# Patient Record
Sex: FEMALE | URBAN - METROPOLITAN AREA
[De-identification: names, ages, dates, MRNs, and addresses within clinical notes are randomized per-mention and may not be internally consistent; named-entity substitution may affect disease eponyms.]

---

## 2022-11-29 ENCOUNTER — TRANSCRIBE ORDER (OUTPATIENT)
Dept: SCHEDULING | Age: 82
End: 2022-11-29

## 2022-11-29 DIAGNOSIS — R92.8 ABNORMAL MAMMOGRAM: Primary | ICD-10-CM

## 2022-12-06 ENCOUNTER — OFFICE VISIT (OUTPATIENT)
Dept: URGENT CARE | Age: 82
End: 2022-12-06
Payer: MEDICARE

## 2022-12-06 VITALS
HEART RATE: 74 BPM | TEMPERATURE: 98.4 F | RESPIRATION RATE: 16 BRPM | OXYGEN SATURATION: 98 % | DIASTOLIC BLOOD PRESSURE: 64 MMHG | SYSTOLIC BLOOD PRESSURE: 140 MMHG | WEIGHT: 155 LBS

## 2022-12-06 DIAGNOSIS — R79.89 ELEVATED LFTS: ICD-10-CM

## 2022-12-06 DIAGNOSIS — Z86.73 HISTORY OF CVA (CEREBROVASCULAR ACCIDENT): ICD-10-CM

## 2022-12-06 DIAGNOSIS — E87.0 HYPERNATREMIA: ICD-10-CM

## 2022-12-06 DIAGNOSIS — I65.23 BILATERAL CAROTID ARTERY STENOSIS: ICD-10-CM

## 2022-12-06 DIAGNOSIS — I10 PRIMARY HYPERTENSION: Primary | ICD-10-CM

## 2022-12-06 LAB
ALBUMIN SERPL-MCNC: 3.9 G/DL
ALKALINE PHOS POC: 87
ALT SERPL-CCNC: 45 U/L (ref 7–35)
AST SERPL-CCNC: 44 U/L (ref 13–35)
BUN BLD-MCNC: 15 MG/DL
CALCIUM BLD-MCNC: 10.3 MG/DL
CHLORIDE BLD-SCNC: 108 MMOL/L
CO2 POC: 28 MEQ/L
CREAT BLD-MCNC: 0.8 MG/DL
EGFR (POC): ABNORMAL
GLUCOSE POC: 111 MG/DL
POTASSIUM SERPL-SCNC: 3.7 MMOL/L
PROT SERPL-MCNC: 7.3 G/DL
SODIUM SERPL-SCNC: 150 MMOL/L
TOTAL BILIRUBIN POC: 0.9 MG/DL

## 2022-12-06 PROCEDURE — 80053 COMPREHEN METABOLIC PANEL: CPT | Performed by: FAMILY MEDICINE

## 2022-12-06 PROCEDURE — 3078F DIAST BP <80 MM HG: CPT | Performed by: FAMILY MEDICINE

## 2022-12-06 PROCEDURE — 1123F ACP DISCUSS/DSCN MKR DOCD: CPT | Performed by: FAMILY MEDICINE

## 2022-12-06 PROCEDURE — 99203 OFFICE O/P NEW LOW 30 MIN: CPT | Performed by: FAMILY MEDICINE

## 2022-12-06 PROCEDURE — 3077F SYST BP >= 140 MM HG: CPT | Performed by: FAMILY MEDICINE

## 2022-12-06 RX ORDER — GUAIFENESIN 100 MG/5ML
81 LIQUID (ML) ORAL DAILY
COMMUNITY

## 2022-12-06 RX ORDER — CLOPIDOGREL BISULFATE 75 MG/1
TABLET ORAL
COMMUNITY

## 2022-12-06 RX ORDER — ATORVASTATIN CALCIUM 80 MG/1
80 TABLET, FILM COATED ORAL DAILY
COMMUNITY

## 2022-12-06 RX ORDER — AMLODIPINE BESYLATE 5 MG/1
5 TABLET ORAL DAILY
COMMUNITY

## 2022-12-06 RX ORDER — LISINOPRIL 20 MG/1
TABLET ORAL DAILY
COMMUNITY

## 2022-12-06 NOTE — PROGRESS NOTES
[address] [facility]  Chief Complaint:     Chief Complaint   Patient presents with    Hypotension     Pt c/o BP of 129/61 today. Subjective:   HPI:  Sugar Mayes is a 81 y.o. female that presents for:    Blood pressure issues:  - hallucinations back in October- sent her to ED    - UTI noted, had acute CVA, blood pressure was over 200s    - blood pressure was uncontrolled went back to ED was on 200s    - started on Norvasc 5mg, lisinopril 20mg, Plavix, lipitor  - Saw PCP who was monitoring her BP   - Doesn't have PCP, neurologist, or vascular here (moved from [facility])  - Mild to moderate dementia at baseline     - Brought her in today as she had BP at home in 120s and 130s and complained of some light headedness (was told by previous doctors that her BP goal was 140-190 SBP so they were worried this was too low for her)  - Reports no dizziness currently and no worsening symptoms going from sitting to standing   - Has maybe one or two cups of fluid a day (around 20 ounce cup)    ROS:   Review of Systems   Respiratory:  Negative for shortness of breath. Cardiovascular:  Negative for chest pain. Neurological:  Negative for dizziness. Psychiatric/Behavioral:  Positive for memory loss. Past medical history, social history, medications, and allergies personally reviewed. Past Medical History:   Diagnosis Date    CAD (coronary artery disease)     Cancer ([facility] [address]     Diabetes ([facility] [address]     Hypertension     Psychiatric disorder     Stroke [facility])         Medications:   Current Outpatient Medications   Medication Sig    lisinopriL (PRINIVIL, ZESTRIL) 20 mg tablet Take  by mouth daily. amLODIPine (NORVASC) 5 mg tablet Take 5 mg by mouth daily. clopidogreL (Plavix) 75 mg tab Take  by mouth. aspirin 81 mg chewable tablet Take 81 mg by mouth daily. atorvastatin (LIPITOR) 80 mg tablet Take 80 mg by mouth daily. No current facility-administered medications for this visit. Allergies:  No Known Allergies       Objective:   Vitals reviewed. Visit Vitals  BP (!) 140/64   Pulse 74   Temp 98.4 °F (36.9 °C)   Resp 16   Wt 155 lb (70.3 kg)   SpO2 98%        Physical Exam:  General Alert. No distress. Not diaphoretic. No jaundice, cyanosis, pallor. HENT Head Normocephalic and atraumatic. Eyes Conjunctivae pink, no discharge. No scleral icterus. EOMI. Cardio Normal rate, regular rhythm. No murmur, gallop, or friction rub. No chest wall tenderness. Pulmonary Effort normal. Breath sounds normal. No respiratory distress. No wheezes, rales, or rhonchi. No Stridor. Neurological No focal deficits. No speech changes. Skin Skin is warm and dry. No rash noted. No erythema. Psychiatric Mood, affect, and judgment normal.        Assessment/Plan:     1. Primary hypertension  -     AMB POC COMPREHENSIVE METABOLIC PANEL  Blood pressure came down to acceptable range. Didn't make adjustments to medications at this time as BP normalized. Recommended establishing with PCP as soon as possible for better BP control. Explained that given her age goal BP around 140 is acceptable but letting it  get up to 190 is putting her back at stroke risk. Continue home BP log.    2. Bilateral carotid artery stenosis  Requesting referral, one artery with 70% stenosis as diagnosed by previous vascular specialist.  -     REFERRAL TO NEUROLOGY  -     REFERRAL TO VASCULAR SURGERY    3. History of CVA (cerebrovascular accident)  Requesting referral.   -     REFERRAL TO NEUROLOGY    4. Hypernatremia  Elevated at 150 but unclear baseline given new patient. Discussed encouraging fluid intake and repeat with new PCP. Given ER/return precautions. 5. Elevated LFTs   Mild elevation, should have repeat and further workup with PCP. Follow up:     Pt was discussed with Dr. Rasta Rose (attending physician). I have reviewed pertinent labs results and other data.  I have discussed the diagnosis with the patient and the intended plan as seen in the above orders. The patient has received an after-visit summary and questions were answered concerning future plans. I have discussed medication side effects and warnings with the patient as well.     Heena Jacobs DO  PGY-2 [facility]  12/06/22

## 2022-12-21 ENCOUNTER — HOSPITAL ENCOUNTER (OUTPATIENT)
Dept: GENERAL RADIOLOGY | Age: 82
Discharge: HOME | End: 2022-12-21
Attending: SURGERY
Payer: MEDICARE

## 2022-12-21 ENCOUNTER — HOSPITAL ENCOUNTER (OUTPATIENT)
Dept: PREADMISSION TESTING | Age: 82
Discharge: HOME | End: 2022-12-21
Payer: MEDICARE

## 2022-12-21 VITALS
HEIGHT: 60 IN | TEMPERATURE: 98.1 F | HEART RATE: 68 BPM | BODY MASS INDEX: 29.43 KG/M2 | SYSTOLIC BLOOD PRESSURE: 178 MMHG | DIASTOLIC BLOOD PRESSURE: 66 MMHG | WEIGHT: 149.91 LBS

## 2022-12-21 LAB
ABO + RH BLD: NORMAL
ALBUMIN SERPL-MCNC: 3.5 G/DL (ref 3.5–5)
ALBUMIN/GLOB SERPL: 1.2 {RATIO} (ref 1.1–2.2)
ALP SERPL-CCNC: 85 U/L (ref 45–117)
ALT SERPL-CCNC: 65 U/L (ref 12–78)
ANION GAP SERPL CALC-SCNC: 5 MMOL/L (ref 5–15)
APTT PPP: 23.8 SEC (ref 22.1–31)
AST SERPL-CCNC: 49 U/L (ref 15–37)
BASOPHILS # BLD: 0 K/UL (ref 0–0.1)
BASOPHILS NFR BLD: 0 % (ref 0–1)
BILIRUB SERPL-MCNC: 0.7 MG/DL (ref 0.2–1)
BLOOD GROUP ANTIBODIES SERPL: NORMAL
BUN SERPL-MCNC: 13 MG/DL (ref 6–20)
BUN/CREAT SERPL: 17 (ref 12–20)
CALCIUM SERPL-MCNC: 9.7 MG/DL (ref 8.5–10.1)
CHLORIDE SERPL-SCNC: 112 MMOL/L (ref 97–108)
CO2 SERPL-SCNC: 25 MMOL/L (ref 21–32)
CREAT SERPL-MCNC: 0.75 MG/DL (ref 0.55–1.02)
DIFFERENTIAL METHOD BLD: ABNORMAL
EOSINOPHIL # BLD: 0 K/UL (ref 0–0.4)
EOSINOPHIL NFR BLD: 0 % (ref 0–7)
ERYTHROCYTE [DISTWIDTH] IN BLOOD BY AUTOMATED COUNT: 12.8 % (ref 11.5–14.5)
EST. AVERAGE GLUCOSE BLD GHB EST-MCNC: 117 MG/DL
GLOBULIN SER CALC-MCNC: 2.9 G/DL (ref 2–4)
GLUCOSE SERPL-MCNC: 152 MG/DL (ref 65–100)
HBA1C MFR BLD: 5.7 % (ref 4–5.6)
HCT VFR BLD AUTO: 36.3 % (ref 35–47)
HGB BLD-MCNC: 12.3 G/DL (ref 11.5–16)
IMM GRANULOCYTES # BLD AUTO: 0 K/UL (ref 0–0.04)
IMM GRANULOCYTES NFR BLD AUTO: 0 % (ref 0–0.5)
INR PPP: 1 (ref 0.9–1.1)
LYMPHOCYTES # BLD: 1.1 K/UL (ref 0.8–3.5)
LYMPHOCYTES NFR BLD: 16 % (ref 12–49)
MCH RBC QN AUTO: 32.2 PG (ref 26–34)
MCHC RBC AUTO-ENTMCNC: 33.9 G/DL (ref 30–36.5)
MCV RBC AUTO: 95 FL (ref 80–99)
MONOCYTES # BLD: 0.3 K/UL (ref 0–1)
MONOCYTES NFR BLD: 4 % (ref 5–13)
NEUTS SEG # BLD: 5.5 K/UL (ref 1.8–8)
NEUTS SEG NFR BLD: 80 % (ref 32–75)
NRBC # BLD: 0 K/UL (ref 0–0.01)
NRBC BLD-RTO: 0 PER 100 WBC
PLATELET # BLD AUTO: 184 K/UL (ref 150–400)
POTASSIUM SERPL-SCNC: 3.5 MMOL/L (ref 3.5–5.1)
PROT SERPL-MCNC: 6.4 G/DL (ref 6.4–8.2)
PROTHROMBIN TIME: 10.7 SEC (ref 9–11.1)
RBC # BLD AUTO: 3.82 M/UL (ref 3.8–5.2)
RBC MORPH BLD: ABNORMAL
SODIUM SERPL-SCNC: 142 MMOL/L (ref 136–145)
SPECIMEN EXP DATE BLD: NORMAL
THERAPEUTIC RANGE,PTTT: NORMAL SECS (ref 58–77)
WBC # BLD AUTO: 6.9 K/UL (ref 3.6–11)

## 2022-12-21 PROCEDURE — 80053 COMPREHEN METABOLIC PANEL: CPT

## 2022-12-21 PROCEDURE — 36415 COLL VENOUS BLD VENIPUNCTURE: CPT

## 2022-12-21 PROCEDURE — 85610 PROTHROMBIN TIME: CPT

## 2022-12-21 PROCEDURE — 85730 THROMBOPLASTIN TIME PARTIAL: CPT

## 2022-12-21 PROCEDURE — 85025 COMPLETE CBC W/AUTO DIFF WBC: CPT

## 2022-12-21 PROCEDURE — 93005 ELECTROCARDIOGRAM TRACING: CPT

## 2022-12-21 PROCEDURE — 86900 BLOOD TYPING SEROLOGIC ABO: CPT

## 2022-12-21 PROCEDURE — 71046 X-RAY EXAM CHEST 2 VIEWS: CPT

## 2022-12-21 PROCEDURE — 83036 HEMOGLOBIN GLYCOSYLATED A1C: CPT

## 2022-12-21 NOTE — PERIOP NOTES
[address] INSTRUCTIONS    Surgery Date:   12/30/2022    Your surgeon's office or [facility] staff will call you between 4 PM- 8 PM the day before surgery with your arrival time. If your surgery is on a Monday, you will receive a call the preceding Friday. Please report to [facility] Patient Access/Admitting on the 1st floor. Bring your insurance card, photo identification, and any copayment ( if applicable). If you are going home the same day of your surgery, you must have a responsible adult to drive you home. You need to have a responsible adult to stay with you the first 24 hours after surgery and you should not drive a car for 24 hours following your surgery. Nothing to eat or drink after midnight the night before surgery. This includes no water, gum, mints, coffee, juice, etc.  Please note special instructions, if applicable, below for medications. Do NOT drink alcohol or smoke 24 hours before surgery. STOP smoking for 14 days prior as it helps with breathing and healing after surgery. If you are being admitted to the hospital, please leave personal belongings/luggage in your car until you have an assigned hospital room number. Please wear comfortable clothes. Wear your glasses instead of contacts. We ask that all money, jewelry and valuables be left at home. Wear no make up, particularly mascara, the day of surgery. All body piercings, rings, and jewelry need to be removed and left at home. Please remove any nail polish or artificial nails from your fingernails. Please wear your hair loose or down. Please no pony-tails, buns, or any metal hair accessories. If you shower the morning of surgery, please do not apply any lotions or powders afterwards. You may wear deodorant, unless having breast surgery. Do not shave any body area within 24 hours of your surgery. Please follow all instructions to avoid any potential surgical cancellation.   Should your physical condition change, (i.e. fever, cold, flu, etc.) please notify your surgeon as soon as possible. It is important to be on time. If a situation occurs where you may be delayed, please call:  [phone] / [phone] on the day of surgery. The Preadmission Testing staff can be reached at [phone]. Special instructions: [facility]      Current Outpatient Medications   Medication Sig    EC ASPIRIN PO Take 81 mg by mouth. tetrahydrozoline/polyethyl gly (EYE DROPS OP) Apply  to eye as needed. lisinopriL (PRINIVIL, ZESTRIL) 20 mg tablet Take  by mouth Every morning. amLODIPine (NORVASC) 5 mg tablet Take 5 mg by mouth Every morning. clopidogreL (PLAVIX) 75 mg tab Take  by mouth Every morning. atorvastatin (LIPITOR) 80 mg tablet Take 80 mg by mouth nightly. No current facility-administered medications for this encounter. YOU MUST ONLY TAKE THESE MEDICATIONS THE MORNING OF SURGERY WITH A SIP OF WATER: AMLODIPINE,   MEDICATIONS TO TAKE THE MORNING OF SURGERY ONLY IF NEEDED: EYE DROPS    Ask your surgeon/prescribing physician about when/if to STOP taking these medications: ASPIRIN AND PLAVIX    Stop all vitamins, herbal medicines and Aspirin containing products 7 days prior to surgery. Stop any non-steroidal anti-inflammatory drugs (i.e. Ibuprofen, Naproxen, Advil, Aleve) 3 days before surgery. You may take Tylenol. If you are currently taking Plavix, Coumadin, or any other blood-thinning/anticoagulant medication contact your prescribing physician for instructions. Preventing Infections Before and After - Your Surgery    IMPORTANT INSTRUCTIONS      You play an important role in your health and preparation for surgery. To reduce the germs on your skin you will need to shower with CHG soap (Chorhexidine gluconate 4%) two times before surgery.     CHG soap (Hibiclens, Hex-A-Clens or store brand)  CHG soap will be provided at your Preadmission Testing (PAT) appointment. If you do not have a PAT appointment before surgery, you may arrange to  CHG soap from our office or purchase CHG soap at a pharmacy, grocery or department store. You need to purchase TWO 4 ounce bottles to use for your 2 showers. Steps to follow:  Sudie Loron your hair with your normal shampoo and your body with regular soap and rinse well to remove shampoo and soap from your skin. Wet a clean washcloth and turn off the shower. Put CHG soap on washcloth and apply to your entire body from the neck down. Do not use on your head, face or private parts(genitals). Do not use CHG soap on open sores, wounds or areas of skin irritation. Wash you body gently for 5 minutes. Do not wash your skin too hard. This soap does not create lather. Pay special attention to your underarms and from your belly button to your feet. Turn the shower back on and rinse well to get CHG soap off your body. Pat your skin dry with a clean, dry towel. Do not apply lotions or moisturizer. Put on clean clothes and sleep on fresh bed sheets and do not allow pets to sleep with you. Shower with CHG soap 2 times before your surgery  The evening before your surgery  The morning of your surgery      Tips to help prevent infections after your surgery:  Protect your surgical wound from germs:  Hand washing is the most important thing you and your caregivers can do to prevent infections. Keep your bandage clean and dry! Do not touch your surgical wound. Use clean, freshly washed towels and washcloths every time you shower; do not share bath linens with others. Until your surgical wound is healed, wear clothing and sleep on bed linens each day that are clean and freshly washed. Do not allow pets to sleep in your bed with you or touch your surgical wound. Do not smoke - smoking delays wound healing. This may be a good time to stop smoking.   If you have diabetes, it is important for you to manage your blood sugar levels properly before your surgery as well as after your surgery. Poorly managed blood sugar levels slow down wound healing and prevent you from healing completely. Patient Information Regarding COVID Restrictions    Day of Procedure    Please park in the parking deck or any designated visitor parking lot. Enter the facility through the Main Entrance of the hospital.  On the day of surgery, please provide the cell phone number of the person who will be waiting for you to the Patient Access representative at the time of registration. Masks are highly recommended in the hospital, but not required. Once your procedure and the immediate recovery period is completed, a nurse in the recovery area will contact your designated visitor to inform them of your room number or to otherwise review other pertinent information regarding your care. Social distancing practices are strongly encouraged in waiting areas and the cafeteria. The patient was contacted  in person. She verbalized understanding of all instructions does not  need reinforcement.

## 2022-12-21 NOTE — PERIOP NOTES
COPY OF COVID VACCINATION CARD PLACED ON CHART    PT AND GRANDSON INSTRUCTED TO GO HAVE CXR DONE AFTER PAT. DIRECTIONS GIVEN.

## 2022-12-22 LAB
ATRIAL RATE: 66 BPM
CALCULATED P AXIS, ECG09: 67 DEGREES
CALCULATED R AXIS, ECG10: 6 DEGREES
CALCULATED T AXIS, ECG11: 47 DEGREES
DIAGNOSIS, 93000: NORMAL
P-R INTERVAL, ECG05: 182 MS
Q-T INTERVAL, ECG07: 430 MS
QRS DURATION, ECG06: 122 MS
QTC CALCULATION (BEZET), ECG08: 450 MS
VENTRICULAR RATE, ECG03: 66 BPM

## 2022-12-23 ENCOUNTER — HOSPITAL ENCOUNTER (OUTPATIENT)
Dept: MAMMOGRAPHY | Age: 82
Discharge: HOME | End: 2022-12-23

## 2022-12-23 DIAGNOSIS — R92.8 ABNORMAL MAMMOGRAM: ICD-10-CM

## 2022-12-23 NOTE — PROGRESS NOTES
Pt came in for Lt dx mammogram to follow up  from abnormal mammogram done at an out of state facility. As of today, we have not received those images. Registrar Eamon Luong spoke to pt's daughter who requested images 2 weeks ago. She will call facility & make sure images have been sent to us.  She will also reschedule her mom's mammogram.

## 2022-12-30 ENCOUNTER — HOSPITAL ENCOUNTER (INPATIENT)
Age: 82
LOS: 1 days | Discharge: HOME | End: 2022-12-31
Attending: EMERGENCY MEDICINE | Admitting: SURGERY
Payer: MEDICARE

## 2022-12-30 ENCOUNTER — ANESTHESIA (OUTPATIENT)
Dept: CARDIOTHORACIC SURGERY | Age: 82
End: 2022-12-30
Payer: MEDICARE

## 2022-12-30 ENCOUNTER — APPOINTMENT (OUTPATIENT)
Dept: GENERAL RADIOLOGY | Age: 82
End: 2022-12-30
Attending: SURGERY
Payer: MEDICARE

## 2022-12-30 ENCOUNTER — APPOINTMENT (OUTPATIENT)
Dept: GENERAL RADIOLOGY | Age: 82
End: 2022-12-30
Attending: EMERGENCY MEDICINE
Payer: MEDICARE

## 2022-12-30 ENCOUNTER — ANESTHESIA EVENT (OUTPATIENT)
Dept: CARDIOTHORACIC SURGERY | Age: 82
End: 2022-12-30
Payer: MEDICARE

## 2022-12-30 DIAGNOSIS — R55 NEAR SYNCOPE: Primary | ICD-10-CM

## 2022-12-30 DIAGNOSIS — E87.6 HYPOKALEMIA: ICD-10-CM

## 2022-12-30 PROBLEM — I65.22 SYMPTOMATIC STENOSIS OF LEFT CAROTID ARTERY: Status: ACTIVE | Noted: 2022-12-30

## 2022-12-30 LAB
ACT BLD: 263 SECS (ref 79–138)
ALBUMIN SERPL-MCNC: 3.6 G/DL (ref 3.5–5)
ALBUMIN/GLOB SERPL: 1 {RATIO} (ref 1.1–2.2)
ALP SERPL-CCNC: 86 U/L (ref 45–117)
ALT SERPL-CCNC: 43 U/L (ref 12–78)
ANION GAP SERPL CALC-SCNC: 8 MMOL/L (ref 5–15)
APPEARANCE UR: CLEAR
AST SERPL-CCNC: 34 U/L (ref 15–37)
ATRIAL RATE: 79 BPM
BACTERIA URNS QL MICRO: NEGATIVE /HPF
BASOPHILS # BLD: 0 K/UL (ref 0–0.1)
BASOPHILS NFR BLD: 0 % (ref 0–1)
BILIRUB SERPL-MCNC: 1 MG/DL (ref 0.2–1)
BILIRUB UR QL: NEGATIVE
BUN SERPL-MCNC: 17 MG/DL (ref 6–20)
BUN/CREAT SERPL: 17 (ref 12–20)
CALCIUM SERPL-MCNC: 9.4 MG/DL (ref 8.5–10.1)
CALCULATED P AXIS, ECG09: -28 DEGREES
CALCULATED R AXIS, ECG10: 16 DEGREES
CALCULATED T AXIS, ECG11: 51 DEGREES
CAOX CRY URNS QL MICRO: ABNORMAL
CHLORIDE SERPL-SCNC: 106 MMOL/L (ref 97–108)
CO2 SERPL-SCNC: 22 MMOL/L (ref 21–32)
COLOR UR: ABNORMAL
COMMENT, HOLDF: NORMAL
COMMENT, HOLDF: NORMAL
CREAT SERPL-MCNC: 1.01 MG/DL (ref 0.55–1.02)
DIAGNOSIS, 93000: NORMAL
DIFFERENTIAL METHOD BLD: ABNORMAL
EOSINOPHIL # BLD: 0 K/UL (ref 0–0.4)
EOSINOPHIL NFR BLD: 0 % (ref 0–7)
EPITH CASTS URNS QL MICRO: ABNORMAL /LPF
ERYTHROCYTE [DISTWIDTH] IN BLOOD BY AUTOMATED COUNT: 13.3 % (ref 11.5–14.5)
GLOBULIN SER CALC-MCNC: 3.6 G/DL (ref 2–4)
GLUCOSE BLD STRIP.AUTO-MCNC: 97 MG/DL (ref 65–117)
GLUCOSE SERPL-MCNC: 149 MG/DL (ref 65–100)
GLUCOSE UR STRIP.AUTO-MCNC: NEGATIVE MG/DL
HCT VFR BLD AUTO: 34.2 % (ref 35–47)
HGB BLD-MCNC: 11.9 G/DL (ref 11.5–16)
HGB UR QL STRIP: NEGATIVE
IMM GRANULOCYTES # BLD AUTO: 0 K/UL (ref 0–0.04)
IMM GRANULOCYTES NFR BLD AUTO: 0 % (ref 0–0.5)
KETONES UR QL STRIP.AUTO: NEGATIVE MG/DL
LEUKOCYTE ESTERASE UR QL STRIP.AUTO: ABNORMAL
LYMPHOCYTES # BLD: 1.1 K/UL (ref 0.8–3.5)
LYMPHOCYTES NFR BLD: 13 % (ref 12–49)
MAGNESIUM SERPL-MCNC: 1.8 MG/DL (ref 1.6–2.4)
MCH RBC QN AUTO: 31.9 PG (ref 26–34)
MCHC RBC AUTO-ENTMCNC: 34.8 G/DL (ref 30–36.5)
MCV RBC AUTO: 91.7 FL (ref 80–99)
MONOCYTES # BLD: 0.6 K/UL (ref 0–1)
MONOCYTES NFR BLD: 8 % (ref 5–13)
NEUTS SEG # BLD: 6.2 K/UL (ref 1.8–8)
NEUTS SEG NFR BLD: 79 % (ref 32–75)
NITRITE UR QL STRIP.AUTO: NEGATIVE
NRBC # BLD: 0 K/UL (ref 0–0.01)
NRBC BLD-RTO: 0 PER 100 WBC
P-R INTERVAL, ECG05: 110 MS
PH UR STRIP: 6 [PH] (ref 5–8)
PLATELET # BLD AUTO: 173 K/UL (ref 150–400)
PMV BLD AUTO: 10.7 FL (ref 8.9–12.9)
POTASSIUM SERPL-SCNC: 2.7 MMOL/L (ref 3.5–5.1)
POTASSIUM SERPL-SCNC: 3.8 MMOL/L (ref 3.5–5.1)
PROT SERPL-MCNC: 7.2 G/DL (ref 6.4–8.2)
PROT UR STRIP-MCNC: NEGATIVE MG/DL
Q-T INTERVAL, ECG07: 394 MS
QRS DURATION, ECG06: 112 MS
QTC CALCULATION (BEZET), ECG08: 451 MS
RBC # BLD AUTO: 3.73 M/UL (ref 3.8–5.2)
RBC #/AREA URNS HPF: ABNORMAL /HPF (ref 0–5)
SAMPLES BEING HELD,HOLD: NORMAL
SAMPLES BEING HELD,HOLD: NORMAL
SERVICE CMNT-IMP: NORMAL
SODIUM SERPL-SCNC: 136 MMOL/L (ref 136–145)
SP GR UR REFRACTOMETRY: 1.01 (ref 1–1.03)
TROPONIN-HIGH SENSITIVITY: 28 NG/L (ref 0–51)
TROPONIN-HIGH SENSITIVITY: 35 NG/L (ref 0–51)
UROBILINOGEN UR QL STRIP.AUTO: 0.2 EU/DL (ref 0.2–1)
VENTRICULAR RATE, ECG03: 79 BPM
WBC # BLD AUTO: 7.9 K/UL (ref 3.6–11)
WBC URNS QL MICRO: ABNORMAL /HPF (ref 0–4)

## 2022-12-30 PROCEDURE — 77030008684 HC TU ET CUF COVD -B: Performed by: ANESTHESIOLOGY

## 2022-12-30 PROCEDURE — 36415 COLL VENOUS BLD VENIPUNCTURE: CPT

## 2022-12-30 PROCEDURE — 74011000250 HC RX REV CODE- 250: Performed by: NURSE ANESTHETIST, CERTIFIED REGISTERED

## 2022-12-30 PROCEDURE — 76010000131 HC OR TIME 2 TO 2.5 HR: Performed by: SURGERY

## 2022-12-30 PROCEDURE — C1725 CATH, TRANSLUMIN NON-LASER: HCPCS | Performed by: SURGERY

## 2022-12-30 PROCEDURE — 83735 ASSAY OF MAGNESIUM: CPT

## 2022-12-30 PROCEDURE — 74011250636 HC RX REV CODE- 250/636: Performed by: ANESTHESIOLOGY

## 2022-12-30 PROCEDURE — 84132 ASSAY OF SERUM POTASSIUM: CPT

## 2022-12-30 PROCEDURE — 74011250636 HC RX REV CODE- 250/636: Performed by: SURGERY

## 2022-12-30 PROCEDURE — C1892 INTRO/SHEATH,FIXED,PEEL-AWAY: HCPCS | Performed by: SURGERY

## 2022-12-30 PROCEDURE — 77030031139 HC SUT VCRL2 J&J -A: Performed by: SURGERY

## 2022-12-30 PROCEDURE — 76210000017 HC OR PH I REC 1.5 TO 2 HR: Performed by: SURGERY

## 2022-12-30 PROCEDURE — 71045 X-RAY EXAM CHEST 1 VIEW: CPT

## 2022-12-30 PROCEDURE — 80053 COMPREHEN METABOLIC PANEL: CPT

## 2022-12-30 PROCEDURE — 74011000250 HC RX REV CODE- 250: Performed by: SURGERY

## 2022-12-30 PROCEDURE — 77030002986 HC SUT PROL J&J -A: Performed by: SURGERY

## 2022-12-30 PROCEDURE — 84484 ASSAY OF TROPONIN QUANT: CPT

## 2022-12-30 PROCEDURE — 77030018673: Performed by: SURGERY

## 2022-12-30 PROCEDURE — 81001 URINALYSIS AUTO W/SCOPE: CPT

## 2022-12-30 PROCEDURE — C1894 INTRO/SHEATH, NON-LASER: HCPCS | Performed by: SURGERY

## 2022-12-30 PROCEDURE — 93005 ELECTROCARDIOGRAM TRACING: CPT

## 2022-12-30 PROCEDURE — 77030013060 HC DEV INFL PRSS MRTM -B: Performed by: SURGERY

## 2022-12-30 PROCEDURE — 99285 EMERGENCY DEPT VISIT HI MDM: CPT

## 2022-12-30 PROCEDURE — 74011250637 HC RX REV CODE- 250/637: Performed by: SURGERY

## 2022-12-30 PROCEDURE — X2AJ336 CEREBRAL EMBOLIC FILTRATION, EXTRACORPOREAL FLOW REVERSAL CIRCUIT FROM LEFT COMMON CAROTID ARTERY, PERCUTANEOUS APPROACH, NEW TECHNOLOGY GROUP 6: ICD-10-PCS | Performed by: SURGERY

## 2022-12-30 PROCEDURE — 77030008462 HC STPLR SKN PROX J&J -A: Performed by: SURGERY

## 2022-12-30 PROCEDURE — C1876 STENT, NON-COA/NON-COV W/DEL: HCPCS | Performed by: SURGERY

## 2022-12-30 PROCEDURE — 74011000250 HC RX REV CODE- 250: Performed by: ANESTHESIOLOGY

## 2022-12-30 PROCEDURE — 77030010507 HC ADH SKN DERMBND J&J -B: Performed by: SURGERY

## 2022-12-30 PROCEDURE — 77030021678 HC GLIDESCP STAT DISP VERT -B: Performed by: ANESTHESIOLOGY

## 2022-12-30 PROCEDURE — 77030026438 HC STYL ET INTUB CARD -A: Performed by: ANESTHESIOLOGY

## 2022-12-30 PROCEDURE — 74011250637 HC RX REV CODE- 250/637: Performed by: EMERGENCY MEDICINE

## 2022-12-30 PROCEDURE — 2709999900 HC NON-CHARGEABLE SUPPLY: Performed by: SURGERY

## 2022-12-30 PROCEDURE — 76060000035 HC ANESTHESIA 2 TO 2.5 HR: Performed by: SURGERY

## 2022-12-30 PROCEDURE — 74011250636 HC RX REV CODE- 250/636: Performed by: NURSE ANESTHETIST, CERTIFIED REGISTERED

## 2022-12-30 PROCEDURE — 85025 COMPLETE CBC W/AUTO DIFF WBC: CPT

## 2022-12-30 PROCEDURE — 037J3DZ DILATION OF LEFT COMMON CAROTID ARTERY WITH INTRALUMINAL DEVICE, PERCUTANEOUS APPROACH: ICD-10-PCS | Performed by: SURGERY

## 2022-12-30 PROCEDURE — 77030020061 HC IV BLD WRMR ADMIN SET 3M -B: Performed by: ANESTHESIOLOGY

## 2022-12-30 PROCEDURE — 03FY3ZZ FRAGMENTATION OF UPPER ARTERY, PERCUTANEOUS APPROACH: ICD-10-PCS | Performed by: SURGERY

## 2022-12-30 PROCEDURE — 65610000006 HC RM INTENSIVE CARE

## 2022-12-30 PROCEDURE — 77030002933 HC SUT MCRYL J&J -A: Performed by: SURGERY

## 2022-12-30 PROCEDURE — 74011000636 HC RX REV CODE- 636: Performed by: SURGERY

## 2022-12-30 PROCEDURE — 77030002996 HC SUT SLK J&J -A: Performed by: SURGERY

## 2022-12-30 PROCEDURE — 77030014008 HC SPNG HEMSTAT J&J -C: Performed by: SURGERY

## 2022-12-30 PROCEDURE — 74011250636 HC RX REV CODE- 250/636: Performed by: EMERGENCY MEDICINE

## 2022-12-30 PROCEDURE — 82962 GLUCOSE BLOOD TEST: CPT

## 2022-12-30 PROCEDURE — 77030019702 HC WRP THER MENM -C: Performed by: SURGERY

## 2022-12-30 PROCEDURE — 85347 COAGULATION TIME ACTIVATED: CPT

## 2022-12-30 DEVICE — 8 MM X 30 MM
Type: IMPLANTABLE DEVICE | Site: CAROTID | Status: FUNCTIONAL
Brand: ENROUTE TRANSCAROTID STENT

## 2022-12-30 RX ORDER — ENOXAPARIN SODIUM 100 MG/ML
40 INJECTION SUBCUTANEOUS EVERY 24 HOURS
Status: DISCONTINUED | OUTPATIENT
Start: 2022-12-31 | End: 2022-12-31 | Stop reason: HOSPADM

## 2022-12-30 RX ORDER — ACETAMINOPHEN 500 MG
1000 TABLET ORAL
Status: DISCONTINUED | OUTPATIENT
Start: 2022-12-30 | End: 2022-12-31 | Stop reason: HOSPADM

## 2022-12-30 RX ORDER — ONDANSETRON 2 MG/ML
INJECTION INTRAMUSCULAR; INTRAVENOUS AS NEEDED
Status: DISCONTINUED | OUTPATIENT
Start: 2022-12-30 | End: 2022-12-30 | Stop reason: HOSPADM

## 2022-12-30 RX ORDER — FENTANYL CITRATE 50 UG/ML
25 INJECTION, SOLUTION INTRAMUSCULAR; INTRAVENOUS
Status: DISCONTINUED | OUTPATIENT
Start: 2022-12-30 | End: 2022-12-30 | Stop reason: HOSPADM

## 2022-12-30 RX ORDER — MIDAZOLAM HYDROCHLORIDE 1 MG/ML
1 INJECTION, SOLUTION INTRAMUSCULAR; INTRAVENOUS AS NEEDED
Status: DISCONTINUED | OUTPATIENT
Start: 2022-12-30 | End: 2022-12-30 | Stop reason: HOSPADM

## 2022-12-30 RX ORDER — CLOPIDOGREL BISULFATE 75 MG/1
75 TABLET ORAL
Status: DISPENSED | OUTPATIENT
Start: 2022-12-30 | End: 2022-12-30

## 2022-12-30 RX ORDER — SODIUM CHLORIDE, SODIUM LACTATE, POTASSIUM CHLORIDE, CALCIUM CHLORIDE 600; 310; 30; 20 MG/100ML; MG/100ML; MG/100ML; MG/100ML
100 INJECTION, SOLUTION INTRAVENOUS CONTINUOUS
Status: DISCONTINUED | OUTPATIENT
Start: 2022-12-30 | End: 2022-12-30 | Stop reason: HOSPADM

## 2022-12-30 RX ORDER — SODIUM CHLORIDE 0.9 % (FLUSH) 0.9 %
5-40 SYRINGE (ML) INJECTION EVERY 8 HOURS
Status: DISCONTINUED | OUTPATIENT
Start: 2022-12-30 | End: 2022-12-30 | Stop reason: HOSPADM

## 2022-12-30 RX ORDER — GUAIFENESIN 100 MG/5ML
81 LIQUID (ML) ORAL DAILY
Status: DISCONTINUED | OUTPATIENT
Start: 2022-12-30 | End: 2022-12-31 | Stop reason: HOSPADM

## 2022-12-30 RX ORDER — FENTANYL CITRATE 50 UG/ML
INJECTION, SOLUTION INTRAMUSCULAR; INTRAVENOUS AS NEEDED
Status: DISCONTINUED | OUTPATIENT
Start: 2022-12-30 | End: 2022-12-30 | Stop reason: HOSPADM

## 2022-12-30 RX ORDER — ROPIVACAINE HYDROCHLORIDE 5 MG/ML
30 INJECTION, SOLUTION EPIDURAL; INFILTRATION; PERINEURAL AS NEEDED
Status: DISCONTINUED | OUTPATIENT
Start: 2022-12-30 | End: 2022-12-30 | Stop reason: HOSPADM

## 2022-12-30 RX ORDER — HYDRALAZINE HYDROCHLORIDE 20 MG/ML
10 INJECTION INTRAMUSCULAR; INTRAVENOUS
Status: DISCONTINUED | OUTPATIENT
Start: 2022-12-30 | End: 2022-12-31 | Stop reason: HOSPADM

## 2022-12-30 RX ORDER — TRAMADOL HYDROCHLORIDE 50 MG/1
50 TABLET ORAL
Status: DISCONTINUED | OUTPATIENT
Start: 2022-12-30 | End: 2022-12-31 | Stop reason: HOSPADM

## 2022-12-30 RX ORDER — LABETALOL HYDROCHLORIDE 5 MG/ML
INJECTION, SOLUTION INTRAVENOUS AS NEEDED
Status: DISCONTINUED | OUTPATIENT
Start: 2022-12-30 | End: 2022-12-30 | Stop reason: HOSPADM

## 2022-12-30 RX ORDER — ACETAMINOPHEN 325 MG/1
650 TABLET ORAL ONCE
Status: DISCONTINUED | OUTPATIENT
Start: 2022-12-30 | End: 2022-12-30 | Stop reason: HOSPADM

## 2022-12-30 RX ORDER — MORPHINE SULFATE 2 MG/ML
2 INJECTION, SOLUTION INTRAMUSCULAR; INTRAVENOUS
Status: DISCONTINUED | OUTPATIENT
Start: 2022-12-30 | End: 2022-12-30 | Stop reason: HOSPADM

## 2022-12-30 RX ORDER — HEPARIN SODIUM 1000 [USP'U]/ML
INJECTION, SOLUTION INTRAVENOUS; SUBCUTANEOUS AS NEEDED
Status: DISCONTINUED | OUTPATIENT
Start: 2022-12-30 | End: 2022-12-30 | Stop reason: HOSPADM

## 2022-12-30 RX ORDER — DEXAMETHASONE SODIUM PHOSPHATE 4 MG/ML
INJECTION, SOLUTION INTRA-ARTICULAR; INTRALESIONAL; INTRAMUSCULAR; INTRAVENOUS; SOFT TISSUE AS NEEDED
Status: DISCONTINUED | OUTPATIENT
Start: 2022-12-30 | End: 2022-12-30 | Stop reason: HOSPADM

## 2022-12-30 RX ORDER — GLYCOPYRROLATE 0.2 MG/ML
INJECTION INTRAMUSCULAR; INTRAVENOUS AS NEEDED
Status: DISCONTINUED | OUTPATIENT
Start: 2022-12-30 | End: 2022-12-30 | Stop reason: HOSPADM

## 2022-12-30 RX ORDER — POTASSIUM CHLORIDE 750 MG/1
40 TABLET, FILM COATED, EXTENDED RELEASE ORAL
Status: COMPLETED | OUTPATIENT
Start: 2022-12-30 | End: 2022-12-30

## 2022-12-30 RX ORDER — SODIUM CHLORIDE 0.9 % (FLUSH) 0.9 %
5-40 SYRINGE (ML) INJECTION EVERY 8 HOURS
Status: DISCONTINUED | OUTPATIENT
Start: 2022-12-30 | End: 2022-12-31 | Stop reason: HOSPADM

## 2022-12-30 RX ORDER — PROPOFOL 10 MG/ML
INJECTION, EMULSION INTRAVENOUS AS NEEDED
Status: DISCONTINUED | OUTPATIENT
Start: 2022-12-30 | End: 2022-12-30 | Stop reason: HOSPADM

## 2022-12-30 RX ORDER — MIDAZOLAM HYDROCHLORIDE 1 MG/ML
0.5 INJECTION, SOLUTION INTRAMUSCULAR; INTRAVENOUS
Status: DISCONTINUED | OUTPATIENT
Start: 2022-12-30 | End: 2022-12-30 | Stop reason: HOSPADM

## 2022-12-30 RX ORDER — ONDANSETRON 2 MG/ML
4 INJECTION INTRAMUSCULAR; INTRAVENOUS AS NEEDED
Status: DISCONTINUED | OUTPATIENT
Start: 2022-12-30 | End: 2022-12-30 | Stop reason: HOSPADM

## 2022-12-30 RX ORDER — OXYCODONE HYDROCHLORIDE 5 MG/1
5 TABLET ORAL
Status: DISCONTINUED | OUTPATIENT
Start: 2022-12-30 | End: 2022-12-31 | Stop reason: HOSPADM

## 2022-12-30 RX ORDER — LIDOCAINE HYDROCHLORIDE 20 MG/ML
INJECTION, SOLUTION EPIDURAL; INFILTRATION; INTRACAUDAL; PERINEURAL AS NEEDED
Status: DISCONTINUED | OUTPATIENT
Start: 2022-12-30 | End: 2022-12-30 | Stop reason: HOSPADM

## 2022-12-30 RX ORDER — DIPHENHYDRAMINE HYDROCHLORIDE 50 MG/ML
12.5 INJECTION, SOLUTION INTRAMUSCULAR; INTRAVENOUS AS NEEDED
Status: DISCONTINUED | OUTPATIENT
Start: 2022-12-30 | End: 2022-12-30 | Stop reason: HOSPADM

## 2022-12-30 RX ORDER — ONDANSETRON 2 MG/ML
4 INJECTION INTRAMUSCULAR; INTRAVENOUS
Status: DISCONTINUED | OUTPATIENT
Start: 2022-12-30 | End: 2022-12-31 | Stop reason: HOSPADM

## 2022-12-30 RX ORDER — FENTANYL CITRATE 50 UG/ML
50 INJECTION, SOLUTION INTRAMUSCULAR; INTRAVENOUS AS NEEDED
Status: DISCONTINUED | OUTPATIENT
Start: 2022-12-30 | End: 2022-12-30 | Stop reason: HOSPADM

## 2022-12-30 RX ORDER — LIDOCAINE HYDROCHLORIDE 10 MG/ML
0.1 INJECTION, SOLUTION EPIDURAL; INFILTRATION; INTRACAUDAL; PERINEURAL AS NEEDED
Status: DISCONTINUED | OUTPATIENT
Start: 2022-12-30 | End: 2022-12-30 | Stop reason: HOSPADM

## 2022-12-30 RX ORDER — SUCCINYLCHOLINE CHLORIDE 20 MG/ML
INJECTION INTRAMUSCULAR; INTRAVENOUS AS NEEDED
Status: DISCONTINUED | OUTPATIENT
Start: 2022-12-30 | End: 2022-12-30 | Stop reason: HOSPADM

## 2022-12-30 RX ORDER — ROCURONIUM BROMIDE 10 MG/ML
INJECTION, SOLUTION INTRAVENOUS AS NEEDED
Status: DISCONTINUED | OUTPATIENT
Start: 2022-12-30 | End: 2022-12-30 | Stop reason: HOSPADM

## 2022-12-30 RX ORDER — AMLODIPINE BESYLATE 5 MG/1
5 TABLET ORAL
Status: ACTIVE | OUTPATIENT
Start: 2022-12-30 | End: 2022-12-30

## 2022-12-30 RX ORDER — SODIUM CHLORIDE 0.9 % (FLUSH) 0.9 %
5-40 SYRINGE (ML) INJECTION AS NEEDED
Status: DISCONTINUED | OUTPATIENT
Start: 2022-12-30 | End: 2022-12-30 | Stop reason: HOSPADM

## 2022-12-30 RX ORDER — PROTAMINE SULFATE 10 MG/ML
INJECTION, SOLUTION INTRAVENOUS AS NEEDED
Status: DISCONTINUED | OUTPATIENT
Start: 2022-12-30 | End: 2022-12-30 | Stop reason: HOSPADM

## 2022-12-30 RX ORDER — HYDROMORPHONE HYDROCHLORIDE 1 MG/ML
0.5 INJECTION, SOLUTION INTRAMUSCULAR; INTRAVENOUS; SUBCUTANEOUS
Status: DISCONTINUED | OUTPATIENT
Start: 2022-12-30 | End: 2022-12-30 | Stop reason: HOSPADM

## 2022-12-30 RX ORDER — SODIUM CHLORIDE 9 MG/ML
25 INJECTION, SOLUTION INTRAVENOUS CONTINUOUS
Status: DISCONTINUED | OUTPATIENT
Start: 2022-12-30 | End: 2022-12-30 | Stop reason: HOSPADM

## 2022-12-30 RX ORDER — SODIUM CHLORIDE 0.9 % (FLUSH) 0.9 %
5-40 SYRINGE (ML) INJECTION AS NEEDED
Status: DISCONTINUED | OUTPATIENT
Start: 2022-12-30 | End: 2022-12-31 | Stop reason: HOSPADM

## 2022-12-30 RX ADMIN — LABETALOL HYDROCHLORIDE 5 MG: 5 INJECTION INTRAVENOUS at 12:29

## 2022-12-30 RX ADMIN — SUGAMMADEX 140 MG: 100 INJECTION, SOLUTION INTRAVENOUS at 12:16

## 2022-12-30 RX ADMIN — SODIUM CHLORIDE 80 MCG: 9 INJECTION, SOLUTION INTRAVENOUS at 10:42

## 2022-12-30 RX ADMIN — ACETAMINOPHEN 1000 MG: 500 TABLET ORAL at 19:12

## 2022-12-30 RX ADMIN — PROPOFOL 125 MG: 10 INJECTION, EMULSION INTRAVENOUS at 10:31

## 2022-12-30 RX ADMIN — SODIUM CHLORIDE 1000 ML: 900 INJECTION, SOLUTION INTRAVENOUS at 03:12

## 2022-12-30 RX ADMIN — SODIUM CHLORIDE 40 MCG/MIN: 9 INJECTION, SOLUTION INTRAVENOUS at 10:42

## 2022-12-30 RX ADMIN — SUCCINYLCHOLINE CHLORIDE 120 MG: 20 INJECTION, SOLUTION INTRAMUSCULAR; INTRAVENOUS at 10:31

## 2022-12-30 RX ADMIN — ROCURONIUM BROMIDE 5 MG: 10 SOLUTION INTRAVENOUS at 10:31

## 2022-12-30 RX ADMIN — LABETALOL HYDROCHLORIDE 5 MG: 5 INJECTION INTRAVENOUS at 12:43

## 2022-12-30 RX ADMIN — WATER 2 G: 1 INJECTION INTRAMUSCULAR; INTRAVENOUS; SUBCUTANEOUS at 10:40

## 2022-12-30 RX ADMIN — ONDANSETRON HYDROCHLORIDE 4 MG: 2 INJECTION, SOLUTION INTRAMUSCULAR; INTRAVENOUS at 12:02

## 2022-12-30 RX ADMIN — SODIUM CHLORIDE, PRESERVATIVE FREE 20 ML: 5 INJECTION INTRAVENOUS at 21:31

## 2022-12-30 RX ADMIN — POTASSIUM CHLORIDE 40 MEQ: 750 TABLET, FILM COATED, EXTENDED RELEASE ORAL at 03:42

## 2022-12-30 RX ADMIN — SODIUM CHLORIDE 5 MG/HR: 9 INJECTION, SOLUTION INTRAVENOUS at 12:13

## 2022-12-30 RX ADMIN — PROTAMINE SULFATE 30 MG: 10 INJECTION, SOLUTION INTRAVENOUS at 12:01

## 2022-12-30 RX ADMIN — ROCURONIUM BROMIDE 45 MG: 10 SOLUTION INTRAVENOUS at 10:39

## 2022-12-30 RX ADMIN — SODIUM CHLORIDE, PRESERVATIVE FREE 10 ML: 5 INJECTION INTRAVENOUS at 15:21

## 2022-12-30 RX ADMIN — HYDRALAZINE HYDROCHLORIDE 10 MG: 20 INJECTION INTRAMUSCULAR; INTRAVENOUS at 13:48

## 2022-12-30 RX ADMIN — SODIUM CHLORIDE, POTASSIUM CHLORIDE, SODIUM LACTATE AND CALCIUM CHLORIDE 100 ML/HR: 600; 310; 30; 20 INJECTION, SOLUTION INTRAVENOUS at 09:05

## 2022-12-30 RX ADMIN — HEPARIN SODIUM 8000 UNITS: 1000 INJECTION, SOLUTION INTRAVENOUS; SUBCUTANEOUS at 11:18

## 2022-12-30 RX ADMIN — FENTANYL CITRATE 25 MCG: 50 INJECTION, SOLUTION INTRAMUSCULAR; INTRAVENOUS at 09:25

## 2022-12-30 RX ADMIN — GLYCOPYRROLATE 0.2 MG: 0.2 INJECTION INTRAMUSCULAR; INTRAVENOUS at 10:46

## 2022-12-30 RX ADMIN — LIDOCAINE HYDROCHLORIDE 60 MG: 20 INJECTION, SOLUTION EPIDURAL; INFILTRATION; INTRACAUDAL; PERINEURAL at 10:31

## 2022-12-30 RX ADMIN — ACETAMINOPHEN 1000 MG: 500 TABLET ORAL at 15:20

## 2022-12-30 RX ADMIN — POTASSIUM CHLORIDE 40 MEQ: 750 TABLET, FILM COATED, EXTENDED RELEASE ORAL at 06:44

## 2022-12-30 RX ADMIN — DEXAMETHASONE SODIUM PHOSPHATE 4 MG: 4 INJECTION, SOLUTION INTRAMUSCULAR; INTRAVENOUS at 10:51

## 2022-12-30 RX ADMIN — FENTANYL CITRATE 50 MCG: 50 INJECTION, SOLUTION INTRAMUSCULAR; INTRAVENOUS at 10:31

## 2022-12-30 RX ADMIN — HEPARIN SODIUM 1000 UNITS: 1000 INJECTION, SOLUTION INTRAVENOUS; SUBCUTANEOUS at 11:28

## 2022-12-30 RX ADMIN — ONDANSETRON HYDROCHLORIDE 4 MG: 2 SOLUTION INTRAMUSCULAR; INTRAVENOUS at 14:02

## 2022-12-30 RX ADMIN — FENTANYL CITRATE 50 MCG: 50 INJECTION, SOLUTION INTRAMUSCULAR; INTRAVENOUS at 11:12

## 2022-12-30 RX ADMIN — SODIUM CHLORIDE, POTASSIUM CHLORIDE, SODIUM LACTATE AND CALCIUM CHLORIDE: 600; 310; 30; 20 INJECTION, SOLUTION INTRAVENOUS at 12:08

## 2022-12-30 NOTE — DISCHARGE INSTRUCTIONS
Return to the emergency department with any new or worsening symptoms. Otherwise please follow-up with your primary care provider after you get your procedure. Patient Discharge Instructions    Soila Sender / [id number] : 1940    Admitted 2022 Discharged: 2022     What to do at Home  Take it easy for a few days  Leave dressing for 72-96 hours. Information obtained by :  I understand that if any problems occur once I am at home I am to contact my physician. I understand and acknowledge receipt of the instructions indicated above.                                                                                                                                            R.N.'s Signature                                                                  Date/Time                                                                                                                                              Patient or Representative Signature                                                          Date/Time      Sanjay Araya MD

## 2022-12-30 NOTE — ANESTHESIA PROCEDURE NOTES
Arterial Line Placement    Performed by: Cindy Olsen MD  Authorized by: Cindy Olsen MD     Pre-Procedure  Indications:  Arterial pressure monitoring and blood sampling  Preanesthetic Checklist: patient identified, risks and benefits discussed, anesthesia consent, site marked, patient being monitored, timeout performed and patient being monitored      Procedure:   Prep:  Chlorhexidine  Seldinger Technique?: Yes    Orientation:  Right  Location:  Radial artery  Catheter size:  20 G  Number of attempts:  1    Assessment:   Post-procedure:  Line secured and sterile dressing applied  Patient Tolerance:  Patient tolerated the procedure well with no immediate complications

## 2022-12-30 NOTE — PERIOP NOTES
TRANSFER - OUT REPORT:    Verbal report given to SURESH RN(name) on Anat Fletcher  being transferred to ICU(unit) for routine progression of care       Report consisted of patients Situation, Background, Assessment and   Recommendations(SBAR). Time Pre op antibiotic given:Y  Anesthesia Stop time: Y    Information from the following report(s) SBAR was reviewed with the receiving nurse. Opportunity for questions and clarification was provided. Is the patient on 02? YES       L/Min 2       Other     Is the patient on a monitor? YES    Is the nurse transporting with the patient? YES    Surgical Waiting Area notified of patient's transfer from PACU?  YES      The following personal items collected during your admission accompanied patient upon transfer:   Dental Appliance: Dental Appliances: None  Vision:    Hearing Aid:    Jewelry:    Clothing: Clothing:  (Pt did not come to the pre-op area from the ED w/ clothing)  Other Valuables:    Valuables sent to safe:

## 2022-12-30 NOTE — ED TRIAGE NOTES
Pt arrives via EMS from home with cc of sudden onset of dizziness, lightheadedness, and diaphoresis. No LOC. Yes...

## 2022-12-30 NOTE — ED PROVIDER NOTES
HPI   58-year-old woman with a past medical history of coronary artery disease, hypertension, diabetes, stroke with residual memory deficits, and carotid stenosis who is scheduled for carotid endarterectomy today who presents to the emergency department due to an episode of lightheadedness. History obtained from the patient as well as her daughters were at bedside. Patient states she had just gotten out of the shower. She cleaned her necklace and chlorhexidine like she was instructed to do so. She went to get dressed and then went to walk down the stairs. She apparently called out to one of her daughters because she was lightheaded. She said she was slightly nauseous and then one of her daughters had to sit her down. Her symptoms ultimately resolved and she has not had any recurrence. Triage notes indicate the patient diaphoresis but the patient and the daughters deny this to me. She denies chest pain, shortness of breath, or palpitations. She has been doing fairly well lately. Daughters state that this is happened in the past, but they cannot say what previous evaluations have identified as the cause for this. She has had no fevers. No cough. No urinary symptoms. No nausea vomiting or diarrhea.   Past Medical History:   Diagnosis Date    Breast CA ([facility] [address]     LEFT    CAD (coronary artery disease)     Cancer ([facility])     Dementia ([facility] [address]     POSSIBLE-BEING EVALUATED    Diabetes ([facility] [address]     PT STATES ISN'T DIABETIC ANYMORE    Hypertension     Psychiatric disorder     Stroke [facility])        Past Surgical History:   Procedure Laterality Date    HX CATARACT REMOVAL Bilateral 2018    HX CHOLECYSTECTOMY      HX ORTHOPAEDIC      SHOULDER FRACTURE    HX PARTIAL HYSTERECTOMY  1975         Family History:   Problem Relation Age of Onset    Parkinson's Disease Mother     Cancer Father         GI    Cancer Sister         BREAST    Parkinson's Disease Sister     Parkinson's Disease Brother     Other Brother         HEALTH PROBS R/T WAR    Anesth Problems Neg Hx        Social History     Socioeconomic History    Marital status:      Spouse name: Not on file    Number of children: Not on file    Years of education: Not on file    Highest education level: Not on file   Occupational History    Not on file   Tobacco Use    Smoking status: Former     Packs/day: 1.00     Years: 38.00     Pack years: 38.00     Types: Cigarettes     Quit date: 0     Years since quittin.0    Smokeless tobacco: Never   Vaping Use    Vaping Use: Never used   Substance and Sexual Activity    Alcohol use: Not Currently    Drug use: Never    Sexual activity: Not on file   Other Topics Concern    Not on file   Social History Narrative    Not on file     Social Determinants of Health     Financial Resource Strain: Not on file   Food Insecurity: Not on file   Transportation Needs: Not on file   Physical Activity: Not on file   Stress: Not on file   Social Connections: Not on file   Intimate Partner Violence: Not on file   Housing Stability: Not on file         ALLERGIES: Patient has no known allergies. Review of Systems  A complete review of systems was performed and all systems reviewed are negative less otherwise documented in the HPI  Vitals:    22 0049 22 0228   BP: (!) 152/54 115/64   Pulse: 79    Resp: 18    Temp: 98.7 °F (37.1 °C)    SpO2: 99% 99%            Physical Exam  Constitutional:       Comments: Elderly woman who does not appear ill or acutely distressed   HENT:      Mouth/Throat:      Comments: Moist mucous membranes  Eyes:      General: No scleral icterus. Extraocular Movements: Extraocular movements intact. Neck:      Comments: Trachea midline. Carotid bruit on the left  Cardiovascular:      Comments: Regular rate and rhythm without murmurs. Normal capillary refill  Pulmonary:      Effort: Pulmonary effort is normal. No respiratory distress. Breath sounds: Normal breath sounds. No wheezing or rales. Abdominal:      General: There is no distension. Palpations: Abdomen is soft. Tenderness: There is no abdominal tenderness. Musculoskeletal:         General: No deformity. Normal range of motion. Cervical back: Normal range of motion. Right lower leg: No edema. Left lower leg: No edema. Skin:     General: Skin is warm and dry. Neurological:      Comments: Awake and alert. Speech is normal.  GCS 15. No facial droop. No drift in the upper or lower extremities. No dysmetria with finger-nose testing. No sensory deficits. [facility]    ED Course as of 12/30/22 0334   Fri Dec 30, 2022   0105 EKG shows normal sinus rhythm with rate of 79. QTc is normal at 451. No appreciable arrhythmias. No concerning ST elevations or depressions. Incomplete right bundle branch block. [MM]      ED Course User Index  [MM] Severo Coronado MD   81-year-old female presents with what sounds like an episode of near syncope. She appears well on exam.  No focal deficits on her neurologic exam.Labs including troponins, EKG, and chest x-ray ordered. If suspicious may be her near syncope may be secondary to her carotid stenosis. She is scheduled to have a procedure for this this morning. If she has a reassuring work-up I will reach out to her vascular surgeon to make sure she is still clear to undergo surgery. Work appears reassuring. She is a bit hypokalemic and this will be supplemented. Troponin is 35. Low suspicion for ACS. Chest x-ray clear. I discussed with Dr. Kim Diggs from vascular surgery who will be letting the patient's primary vascular surgeon know about the patient's presentation here in the ER. I am going to hold her here in the ER while waiting for vascular surgery. She will be signed out to the oncoming physician and I suspect she can go to preop for her procedure.     Procedures

## 2022-12-30 NOTE — ROUTINE PROCESS
TRANSFER - OUT REPORT:    Verbal report given to ANGELA Koch(name) on Dominique Paris  being transferred to Pre-op(unit) for routine progression of care       Report consisted of patients Situation, Background, Assessment and   Recommendations(SBAR). Information from the following report(s) SBAR, Kardex, and ED Summary was reviewed with the receiving nurse. Lines:   Peripheral IV 12/30/22 Right Wrist (Active)   Site Assessment Clean, dry, & intact 12/30/22 0117   Phlebitis Assessment 0 12/30/22 0117   Infiltration Assessment 0 12/30/22 0117   Dressing Status Clean, dry, & intact 12/30/22 0117   Dressing Type 4 X 4;Transparent 12/30/22 0117   Hub Color/Line Status Blue;Flushed;Patent 12/30/22 0117   Alcohol Cap Used No 12/30/22 0117        Opportunity for questions and clarification was provided.       Patient transported with:   [employer]

## 2022-12-30 NOTE — ED NOTES
Verbal shift change report given to Avery Griffiths RN  (oncoming nurse) by Taylor Arora RN (offgoing nurse). Report included the following information SBAR, ED Summary, Intake/Output, MAR and Recent Results.

## 2022-12-30 NOTE — OP NOTES
Operative Note    Patient: Charo Smith  YOB: 1940  MRN: [id number]    Date of Procedure: 12/30/2022     Pre-Op Diagnosis: Symptomatic high grade left CAROTID STENOSIS    Post-Op Diagnosis: Same as preoperative diagnosis. Procedure(s):  Ultrasound guided right femoral vein access  Intravascular balloon lithotripsy left common and internal carotid artery  Left transcatheter placement of intravascular cervical carotid artery stenting including reverse flow embolic neuroprotection (TCAR w/ flow reversal)  SVS V TCAR Surveillance Project: NCT [id number]    Surgeon(s):  Henry Lawrence MD    Surgical Assistant: None    Anesthesia: General     Estimated Blood Loss (mL):  less than 50     Complications: None    Specimens: * No specimens in log *     Implants: * No implants in log *    Drains: * No LDAs found *    Findings: High grade proximal ICA stenosis responded very well to balloon angioplasty and stenting. TCAR Data:   Symptomatic L carotid stenosis  GETA  Standard surgical risk  Heparin  Prophylactic glyco  L side lesion involving bifurcation and ICA  Atherosclerotic  Lesion length 1.5cm  Mild ICA tortuosity  Transverse incision  Lesion crossed with Whisper wire  Pre-dilation angioplasty with 4x40mm Shockwave balloon and 4x30mm [place] [employer]   8x30mm ENROUTE stent  10cc Contrast  17min Flow reversal  5:07 Fluoroscopy  Procedure time 70min  Dose area 40.04    Detailed Description of Procedure: The patient was transported to the operating room. The patient was placed supine on the operating room table. Intubation and monitoring per anesthesia. The patient was prepped and draped in a standard fashion. An appropriate surgical timeout was performed with all members of the team present. The patient was administered 2gm ancef for the antibiotic prophylaxis. The patient was pre-treated with prophylactic anti-bradyarrhythmic. A transverse incision was made above the left clavicle. Dissection was carried through the platysma and sub-platysmal flaps were raised. Dissection was carried in the avascular plane between the clavicular heads. The common carotid artery was exposed, dissected free of surrounding structures and encircled with a whiting silastic loop and an umbilical tape. At this point the patient was systemically heparinized. Appropriate systemic anticoagulation was monitored with serial ACT measurements. Heparin was redosed as necessary to maintain an ACT of greater than 250. While awaiting an ACT the right femoral vein was closely examined under ultrasound. It was noted to be patent and compressible. This was accessed under direct visualization with return dark venous appearing blood. Access was sequentially up-sized to the [place] flow reversal venous sheath. ACT >250 was confirmed. A U-stitch was pre-placed in the common carotid artery using a 6-0 prolene suture. The common carotid artery was accessed followed by a micro-access wire. Followed by a microaccess sheath. The carotid angiogram was performed and the extent of the disease was marked. A J-wire was introduced and the carotid sheath was inserted using a stop-short technique. The patient was connected to passive flow reversal and appropriate flow reversal was confirmed. At this point a TCAR time-out was performed. Active flow reversal was established and flow reversal was confirmed. A carotid angiogram was performed. A whisper wire was used to cross the carotid lesion. This was then followed by the 4x40 shockwave balloon. Balloon angioplasty was performed with inflations to 2atm with 30 shocks delivered. On second attempt at inflation the balloon was not holding pressure and was withdrawn. Finally the area of stenosis was ballooned using a 4x30mm balloon and the balloon was noted to inflate nicely to profile. This was then followed by the carotid stent.  After deployment a 2 minute washout was performed on active flow reversal. A completion angiogram was performed in AP, GLADYS and FONTENOT projections. Satisfied with this angiographic result the wire was removed. The carotid occlusion was released. The arterial and venous sheaths were disconnected and the blood was returned. The arterial sheath was pulled out and the previously placed U-stitch was cinched down. This was noted to be appropriately hemostatic. At this point the patient was reversed with protamine. The femoral sheath was removed and manual pressure held. The carotid incision was then closed in a layered fashion with a deep 2-0 vicryl to re-approximate the platysma and finally a 4-0 monocryl to re-approximate the skin edges. The incision was dressed in a sterile manner. The patient tolerated the procedure well. She was successfully extubated. An appropriate neurological exam was confirmed prior to leaving the room after which the patient was transported to PACU in stable condition.     Electronically Signed by Saran Anthony MD on 12/30/2022 at 10:09 AM

## 2022-12-30 NOTE — ANESTHESIA POSTPROCEDURE EVALUATION
Procedure(s):  LEFT TRANSCAROTID ARTERY REVASCULARIZATION. general    Anesthesia Post Evaluation        Patient location during evaluation: PACU  Note status: Adequate. Level of consciousness: responsive to verbal stimuli and sleepy but conscious  Pain management: satisfactory to patient  Airway patency: patent  Anesthetic complications: no  Cardiovascular status: acceptable  Respiratory status: acceptable  Hydration status: acceptable  Comments: +Post-Anesthesia Evaluation and Assessment    Patient: Suni Atkinson MRN: [id number]  SSN: [id number]   YOB: 1940  Age: 81 y.o. Sex: female          Cardiovascular Function/Vital Signs    BP (!) 150/38   Pulse 72   Temp 36.4 °C (97.6 °F)   Resp 19   Ht 5' (1.524 m)   Wt 69.9 kg (154 lb)   SpO2 100%   BMI 30.08 kg/m²     Patient is status post Procedure(s):  LEFT TRANSCAROTID ARTERY REVASCULARIZATION. Nausea/Vomiting: Controlled. Postoperative hydration reviewed and adequate. Pain:  Pain Scale 1: Numeric (0 - 10) (12/30/22 1344)  Pain Intensity 1: 0 (12/30/22 1344)   Managed. Neurological Status:   Neuro (WDL): Within Defined Limits (12/30/22 1344)   At baseline. Mental Status and Level of Consciousness: Arousable. Pulmonary Status:   O2 Device: Nasal cannula (12/30/22 1344)   Adequate oxygenation and airway patent. Complications related to anesthesia: None    Post-anesthesia assessment completed. No concerns. I have evaluated the patient and the patient is stable and ready to be discharged from PACU . Signed By: Rey Pearson MD    12/30/2022        INITIAL Post-op Vital signs:   Vitals Value Taken Time   /38 12/30/22 1415   Temp 36.4 °C (97.6 °F) 12/30/22 1245   Pulse 71 12/30/22 1426   Resp 26 12/30/22 1426   SpO2 100 % 12/30/22 1426   Vitals shown include unvalidated device data.

## 2022-12-30 NOTE — ANESTHESIA PREPROCEDURE EVALUATION
Relevant Problems   No relevant active problems       Anesthetic History   No history of anesthetic complications            Review of Systems / Medical History  Patient summary reviewed, nursing notes reviewed and pertinent labs reviewed    Pulmonary  Within defined limits                 Neuro/Psych   Within defined limits           Cardiovascular  Within defined limits  Hypertension          CAD and PAD    Exercise tolerance: <4 METS     GI/Hepatic/Renal  Within defined limits              Endo/Other  Within defined limits  Diabetes    Cancer     Other Findings            Physical Exam    Airway  Mallampati: II  TM Distance: 4 - 6 cm  Neck ROM: normal range of motion   Mouth opening: Normal     Cardiovascular  Regular rate and rhythm,  S1 and S2 normal,  no murmur, click, rub, or gallop             Dental  No notable dental hx       Pulmonary  Breath sounds clear to auscultation               Abdominal  GI exam deferred       Other Findings            Anesthetic Plan    ASA: 3  Anesthesia type: general    Monitoring Plan: Arterial line      Induction: Intravenous  Anesthetic plan and risks discussed with: Patient

## 2022-12-30 NOTE — PROGRESS NOTES
1500: TRANSFER - IN REPORT:    Verbal report received from Via Juan Rene RN(name) on Dorys Penny  being received from PACU(unit) for routine post - op      Report consisted of patients Situation, Background, Assessment and   Recommendations(SBAR). Information from the following report(s) SBAR, Kardex, ED Summary, Procedure Summary, Intake/Output, MAR, Accordion, Recent Results, Cardiac Rhythm NSR, Alarm Parameters , and Quality Measures was reviewed with the receiving nurse. Opportunity for questions and clarification was provided. Assessment completed upon patients arrival to unit and care assumed. 1930: Bedside shift change report given to [address] [place] (oncoming nurse) by Derrick Pimentel (offgoing nurse). Report included the following information SBAR, Kardex, ED Summary, Procedure Summary, Intake/Output, MAR, Accordion, Recent Results, Cardiac Rhythm NSR, Alarm Parameters , Quality Measures, and Dual Neuro Assessment.

## 2022-12-30 NOTE — H&P
History and Physical    Patient: Roxanna Rob MRN: [id number]  SSN: [id number]    YOB: 1940  Age: 81 y.o. Sex: female      Subjective:      Roxanna Rob is a 81 y.o. female who presents for treatment of high grade L carotid stenosis. She has BL lesions but L side she had prior CVA/TIA so will plan on revascularization on L first.   She presents to the hospital early today after near-syncope while showering this morning. She has remained on her ASA and plavix with no interruptions. Past Medical History:   Diagnosis Date    Breast CA ([facility] [address]     LEFT    CAD (coronary artery disease)     Cancer (HCC)     Dementia (HCC)     POSSIBLE-BEING EVALUATED    Diabetes ([facility] [address]     PT STATES ISN'T DIABETIC ANYMORE    Hypertension     Psychiatric disorder     Stroke [facility])      Past Surgical History:   Procedure Laterality Date    HX CATARACT REMOVAL Bilateral 2018    HX CHOLECYSTECTOMY      HX ORTHOPAEDIC      SHOULDER FRACTURE    HX PARTIAL HYSTERECTOMY        Family History   Problem Relation Age of Onset    Parkinson's Disease Mother     Cancer Father         GI    Cancer Sister         BREAST    Parkinson's Disease Sister     Parkinson's Disease Brother     Other Brother         HEALTH PROBS R/T WAR    Anesth Problems Neg Hx      Social History     Tobacco Use    Smoking status: Former     Packs/day: 1.00     Years: 38.00     Pack years: 38.00     Types: Cigarettes     Quit date:      Years since quittin.0    Smokeless tobacco: Never   Substance Use Topics    Alcohol use: Not Currently      Prior to Admission medications    Medication Sig Start Date End Date Taking? Authorizing Provider   EC ASPIRIN PO Take 81 mg by mouth. Provider, Historical   tetrahydrozoline/polyethyl gly (EYE DROPS OP) Apply  to eye as needed. Provider, Historical   lisinopriL (PRINIVIL, ZESTRIL) 20 mg tablet Take  by mouth Every morning.     Provider, Historical   amLODIPine (NORVASC) 5 mg tablet Take 5 mg by mouth Every morning. Provider, Historical   clopidogreL (PLAVIX) 75 mg tab Take  by mouth Every morning. Provider, Historical   atorvastatin (LIPITOR) 80 mg tablet Take 80 mg by mouth nightly. Provider, Historical        No Known Allergies    Review of Systems:  A comprehensive review of systems was negative except for that written in the History of Present Illness. Objective:     Vitals:    12/30/22 0359 12/30/22 0430 12/30/22 0514 12/30/22 0552   BP:  120/76 137/65 (!) 135/51   Pulse:       Resp:       Temp:       SpO2: 98% 99% 97% 98%        Physical Exam:  General: Patient is pleasant and cooperative and appears to be in no acute distress. HEENT: Normocephalic atraumatic. Appropriate tracking. No scleral icterus. Nares patent. Trachea is midline. Chest: Unlabored respirations. Symmetrical chest expansion. Cardiac: Regular rate and rhythm. Acyanotic  Abdomen: Abdomen is soft, nontender, nondistended. Extremities: Moves all extremities. Vascular:     Assessment:     Hospital Problems  Date Reviewed: 12/6/2022   None    Symptomatic L carotid stenosis    Plan:     Plan on L TCAR today    Plan for L TCAR today. Patient is presently maintained on ASA, statin and plavix. She has been on Plavix leading up to present intervention. She understands alternatives to intervention including continued maximal medical management. We have discussed CEA and TCAR as the two standard-risk interventional options. She has elected to pursue TCAR.     Signed By: Justine Varela MD     December 30, 2022

## 2022-12-30 NOTE — PERIOP NOTES
UNABLE TO COMPLETE NIH ASSESSMENT WHEN PATIENT FIRST ARRIVED AS SHE WAS CONFUSED AND TRYING TO GET UP BECAUSE SHE NEEDED TO VOID. SHORTLY AFTER I WAS UNABLE TO DO IT BECAUSE SHE CAN NOT SEE WITHOUT HER GLASSES. I CALLED WAITING AREA AND HAD SOMEONE GET HER GLASSES FROM HER DAUGHTER AND BRING THEM TO PACU.

## 2022-12-31 VITALS
SYSTOLIC BLOOD PRESSURE: 137 MMHG | HEIGHT: 60 IN | OXYGEN SATURATION: 100 % | BODY MASS INDEX: 30.23 KG/M2 | RESPIRATION RATE: 23 BRPM | DIASTOLIC BLOOD PRESSURE: 42 MMHG | TEMPERATURE: 98 F | WEIGHT: 154 LBS | HEART RATE: 60 BPM

## 2022-12-31 LAB
ANION GAP SERPL CALC-SCNC: 5 MMOL/L (ref 5–15)
BASOPHILS # BLD: 0 K/UL (ref 0–0.1)
BASOPHILS NFR BLD: 0 % (ref 0–1)
BUN SERPL-MCNC: 12 MG/DL (ref 6–20)
BUN/CREAT SERPL: 16 (ref 12–20)
CALCIUM SERPL-MCNC: 9 MG/DL (ref 8.5–10.1)
CHLORIDE SERPL-SCNC: 114 MMOL/L (ref 97–108)
CO2 SERPL-SCNC: 21 MMOL/L (ref 21–32)
CREAT SERPL-MCNC: 0.74 MG/DL (ref 0.55–1.02)
DIFFERENTIAL METHOD BLD: ABNORMAL
EOSINOPHIL # BLD: 0 K/UL (ref 0–0.4)
EOSINOPHIL NFR BLD: 0 % (ref 0–7)
ERYTHROCYTE [DISTWIDTH] IN BLOOD BY AUTOMATED COUNT: 13.2 % (ref 11.5–14.5)
GLUCOSE SERPL-MCNC: 120 MG/DL (ref 65–100)
HCT VFR BLD AUTO: 29 % (ref 35–47)
HGB BLD-MCNC: 9.7 G/DL (ref 11.5–16)
IMM GRANULOCYTES # BLD AUTO: 0 K/UL (ref 0–0.04)
IMM GRANULOCYTES NFR BLD AUTO: 0 % (ref 0–0.5)
LYMPHOCYTES # BLD: 0.9 K/UL (ref 0.8–3.5)
LYMPHOCYTES NFR BLD: 15 % (ref 12–49)
MCH RBC QN AUTO: 31.7 PG (ref 26–34)
MCHC RBC AUTO-ENTMCNC: 33.4 G/DL (ref 30–36.5)
MCV RBC AUTO: 94.8 FL (ref 80–99)
MONOCYTES # BLD: 0.4 K/UL (ref 0–1)
MONOCYTES NFR BLD: 7 % (ref 5–13)
NEUTS SEG # BLD: 4.3 K/UL (ref 1.8–8)
NEUTS SEG NFR BLD: 78 % (ref 32–75)
NRBC # BLD: 0 K/UL (ref 0–0.01)
NRBC BLD-RTO: 0 PER 100 WBC
PLATELET # BLD AUTO: 135 K/UL (ref 150–400)
PMV BLD AUTO: 10.7 FL (ref 8.9–12.9)
POTASSIUM SERPL-SCNC: 3.7 MMOL/L (ref 3.5–5.1)
RBC # BLD AUTO: 3.06 M/UL (ref 3.8–5.2)
SODIUM SERPL-SCNC: 140 MMOL/L (ref 136–145)
WBC # BLD AUTO: 5.6 K/UL (ref 3.6–11)

## 2022-12-31 PROCEDURE — 74011250637 HC RX REV CODE- 250/637: Performed by: EMERGENCY MEDICINE

## 2022-12-31 PROCEDURE — 36415 COLL VENOUS BLD VENIPUNCTURE: CPT

## 2022-12-31 PROCEDURE — 74011000250 HC RX REV CODE- 250: Performed by: SURGERY

## 2022-12-31 PROCEDURE — 74011250637 HC RX REV CODE- 250/637: Performed by: SURGERY

## 2022-12-31 PROCEDURE — 74011250636 HC RX REV CODE- 250/636: Performed by: SURGERY

## 2022-12-31 PROCEDURE — 80048 BASIC METABOLIC PNL TOTAL CA: CPT

## 2022-12-31 PROCEDURE — 85025 COMPLETE CBC W/AUTO DIFF WBC: CPT

## 2022-12-31 RX ADMIN — ENOXAPARIN SODIUM 40 MG: 100 INJECTION SUBCUTANEOUS at 09:05

## 2022-12-31 RX ADMIN — ASPIRIN 81 MG CHEWABLE TABLET 81 MG: 81 TABLET CHEWABLE at 09:04

## 2022-12-31 RX ADMIN — SODIUM CHLORIDE, PRESERVATIVE FREE 10 ML: 5 INJECTION INTRAVENOUS at 06:00

## 2022-12-31 RX ADMIN — ACETAMINOPHEN 1000 MG: 500 TABLET ORAL at 06:55

## 2022-12-31 NOTE — PROGRESS NOTES
1930: Bedside and Verbal shift change report given to VALENTIN Ambrocio, RNC (oncoming nurse) by Ligia, ANGELA (offgoing nurse). Report included the following information SBAR, Kardex, Procedure Summary, Intake/Output, MAR, Accordion, Recent Results, Cardiac Rhythm NSR, Alarm Parameters , and Dual Neuro Assessment  . 2015Coy Pallas, MD rounding. RN addressed potassium lab from previous day. Order received to draw potassium level and place on potassium protocol to replete per protocol. VORB.    0730: Bedside and Verbal shift change report given to NEO Lee (oncoming nurse) by Lei Brunson. Cristóbal RNC (offgoing nurse). Report included the following information SBAR, Kardex, Intake/Output, MAR, Accordion, Recent Results, Cardiac Rhythm NSR/SB, Alarm Parameters , and Dual Neuro Assessment.

## 2023-01-03 NOTE — DISCHARGE SUMMARY
Discharge Summary     Patient: Johann Arreguin MRN: [id number]  SSN: [id number]    YOB: 1940  Age: 81 y.o. Sex: female       Admit Date: 12/30/2022    Discharge Date: 1/3/2023      Admission Diagnoses: Symptomatic stenosis of left carotid artery [I65.22]    Discharge Diagnoses:   Problem List as of 12/31/2022 Date Reviewed: 12/6/2022            Codes Class Noted - Resolved    Symptomatic stenosis of left carotid artery ICD-10-CM: I65.22  ICD-9-CM: 433.10  12/30/2022 - Present            Discharge Condition: Good    Hospital Course: Patient underwent L TCAR for high grade symtpomatic L sided stenosis. Did very well post-operatively. Discharged on POD1 in stable condition. Disposition: home    Discharge Medications:   Cannot display discharge medications since this patient is not currently admitted. Activity: Activity as tolerated  Diet: Regular Diet  Wound Care: Wash all incisions soap and water at least once daily. Follow-up Appointments   Procedures    FOLLOW UP VISIT Appointment in: Two Weeks Call office for appt [phone]     Call office for appt [phone]     Standing Status:   Standing     Number of Occurrences:   1     Order Specific Question:   Appointment in     Answer:    Two Weeks       Signed By: Brianna Morejon MD     January 3, 2023

## 2023-01-08 LAB
ALBUMIN SERPL-MCNC: 3.3 G/DL (ref 3.5–5)
ALBUMIN/GLOB SERPL: 0.9 (ref 1.1–2.2)
ALP SERPL-CCNC: 86 U/L (ref 45–117)
ALT SERPL-CCNC: 34 U/L (ref 12–78)
ANION GAP SERPL CALC-SCNC: 7 MMOL/L (ref 5–15)
AST SERPL-CCNC: 33 U/L (ref 15–37)
BASOPHILS # BLD: 0 K/UL (ref 0–0.1)
BASOPHILS NFR BLD: 0 % (ref 0–1)
BILIRUB SERPL-MCNC: 0.7 MG/DL (ref 0.2–1)
BUN SERPL-MCNC: 17 MG/DL (ref 6–20)
BUN/CREAT SERPL: 21 (ref 12–20)
CALCIUM SERPL-MCNC: 9.1 MG/DL (ref 8.5–10.1)
CHLORIDE SERPL-SCNC: 111 MMOL/L (ref 97–108)
CO2 SERPL-SCNC: 23 MMOL/L (ref 21–32)
COMMENT, HOLDF: NORMAL
CREAT SERPL-MCNC: 0.82 MG/DL (ref 0.55–1.02)
DIFFERENTIAL METHOD BLD: ABNORMAL
EOSINOPHIL # BLD: 0 K/UL (ref 0–0.4)
EOSINOPHIL NFR BLD: 0 % (ref 0–7)
ERYTHROCYTE [DISTWIDTH] IN BLOOD BY AUTOMATED COUNT: 12.7 % (ref 11.5–14.5)
GLOBULIN SER CALC-MCNC: 3.5 G/DL (ref 2–4)
GLUCOSE SERPL-MCNC: 133 MG/DL (ref 65–100)
HCT VFR BLD AUTO: 36.2 % (ref 35–47)
HGB BLD-MCNC: 12.2 G/DL (ref 11.5–16)
IMM GRANULOCYTES # BLD AUTO: 0 K/UL (ref 0–0.04)
IMM GRANULOCYTES NFR BLD AUTO: 1 % (ref 0–0.5)
LYMPHOCYTES # BLD: 1.1 K/UL (ref 0.8–3.5)
LYMPHOCYTES NFR BLD: 13 % (ref 12–49)
MAGNESIUM SERPL-MCNC: 1.7 MG/DL (ref 1.6–2.4)
MCH RBC QN AUTO: 31.3 PG (ref 26–34)
MCHC RBC AUTO-ENTMCNC: 33.7 G/DL (ref 30–36.5)
MCV RBC AUTO: 92.8 FL (ref 80–99)
MONOCYTES # BLD: 0.2 K/UL (ref 0–1)
MONOCYTES NFR BLD: 3 % (ref 5–13)
NEUTS SEG # BLD: 7.3 K/UL (ref 1.8–8)
NEUTS SEG NFR BLD: 83 % (ref 32–75)
NRBC # BLD: 0 K/UL (ref 0–0.01)
NRBC BLD-RTO: 0 PER 100 WBC
PLATELET # BLD AUTO: 229 K/UL (ref 150–400)
PMV BLD AUTO: 10.8 FL (ref 8.9–12.9)
POTASSIUM SERPL-SCNC: 3.2 MMOL/L (ref 3.5–5.1)
PROT SERPL-MCNC: 6.8 G/DL (ref 6.4–8.2)
RBC # BLD AUTO: 3.9 M/UL (ref 3.8–5.2)
SAMPLES BEING HELD,HOLD: NORMAL
SODIUM SERPL-SCNC: 141 MMOL/L (ref 136–145)
TROPONIN-HIGH SENSITIVITY: 11 NG/L (ref 0–51)
WBC # BLD AUTO: 8.6 K/UL (ref 3.6–11)

## 2023-01-08 PROCEDURE — 36415 COLL VENOUS BLD VENIPUNCTURE: CPT

## 2023-01-08 PROCEDURE — 85025 COMPLETE CBC W/AUTO DIFF WBC: CPT

## 2023-01-08 PROCEDURE — 80053 COMPREHEN METABOLIC PANEL: CPT

## 2023-01-08 PROCEDURE — 84484 ASSAY OF TROPONIN QUANT: CPT

## 2023-01-08 PROCEDURE — 83735 ASSAY OF MAGNESIUM: CPT

## 2023-01-08 PROCEDURE — 99285 EMERGENCY DEPT VISIT HI MDM: CPT

## 2023-01-09 ENCOUNTER — APPOINTMENT (OUTPATIENT)
Dept: NON INVASIVE DIAGNOSTICS | Age: 83
End: 2023-01-09
Attending: INTERNAL MEDICINE
Payer: MEDICARE

## 2023-01-09 ENCOUNTER — APPOINTMENT (OUTPATIENT)
Dept: CT IMAGING | Age: 83
End: 2023-01-09
Attending: STUDENT IN AN ORGANIZED HEALTH CARE EDUCATION/TRAINING PROGRAM
Payer: MEDICARE

## 2023-01-09 ENCOUNTER — HOSPITAL ENCOUNTER (OUTPATIENT)
Age: 83
Setting detail: OBSERVATION
Discharge: HOME | End: 2023-01-10
Attending: STUDENT IN AN ORGANIZED HEALTH CARE EDUCATION/TRAINING PROGRAM | Admitting: FAMILY MEDICINE
Payer: MEDICARE

## 2023-01-09 DIAGNOSIS — I65.22 STENOSIS OF LEFT CAROTID ARTERY: ICD-10-CM

## 2023-01-09 DIAGNOSIS — R55 SYNCOPE AND COLLAPSE: Primary | ICD-10-CM

## 2023-01-09 LAB
APPEARANCE UR: CLEAR
BACTERIA URNS QL MICRO: NEGATIVE /HPF
BILIRUB UR QL: NEGATIVE
CAOX CRY URNS QL MICRO: ABNORMAL
COLOR UR: ABNORMAL
ECHO AO ROOT DIAM: 2.4 CM
ECHO AV AREA PEAK VELOCITY: 1.4 CM2
ECHO AV AREA PEAK VELOCITY: 1.8 CM2
ECHO AV PEAK GRADIENT: 6 MMHG
ECHO AV PEAK VELOCITY: 1.2 M/S
ECHO EST RA PRESSURE: 3 MMHG
ECHO LA DIAMETER: 3 CM
ECHO LA TO AORTIC ROOT RATIO: 1.25
ECHO LV E' LATERAL VELOCITY: 7 CM/S
ECHO LV E' SEPTAL VELOCITY: 7 CM/S
ECHO LV FRACTIONAL SHORTENING: 44 % (ref 28–44)
ECHO LV INTERNAL DIMENSION DIASTOLIC: 4.8 CM (ref 3.9–5.3)
ECHO LV INTERNAL DIMENSION SYSTOLIC: 2.7 CM
ECHO LV IVSD: 0.9 CM (ref 0.6–0.9)
ECHO LV MASS 2D: 137.1 G (ref 67–162)
ECHO LV POSTERIOR WALL DIASTOLIC: 0.8 CM (ref 0.6–0.9)
ECHO LV RELATIVE WALL THICKNESS RATIO: 0.33
ECHO LVOT AREA: 1.8 CM2
ECHO LVOT DIAM: 1.5 CM
ECHO LVOT MEAN GRADIENT: 3 MMHG
ECHO LVOT PEAK GRADIENT: 3 MMHG
ECHO LVOT PEAK GRADIENT: 5 MMHG
ECHO LVOT PEAK VELOCITY: 0.9 M/S
ECHO LVOT PEAK VELOCITY: 1.2 M/S
ECHO LVOT SV: 45.9 ML
ECHO LVOT VTI: 26 CM
ECHO MV A VELOCITY: 1.01 M/S
ECHO MV A VELOCITY: 1.02 M/S
ECHO MV AREA PHT: 2.8 CM2
ECHO MV E DECELERATION TIME (DT): 269.7 MS
ECHO MV E VELOCITY: 1.04 M/S
ECHO MV E VELOCITY: 1.04 M/S
ECHO MV PRESSURE HALF TIME (PHT): 78.2 MS
ECHO MV REGURGITANT PEAK GRADIENT: 19 MMHG
ECHO MV REGURGITANT PEAK VELOCITY: 2.2 M/S
ECHO PV MAX VELOCITY: 0.9 M/S
ECHO PV PEAK GRADIENT: 3 MMHG
ECHO RIGHT VENTRICULAR SYSTOLIC PRESSURE (RVSP): 10 MMHG
ECHO RV FREE WALL PEAK S': 14 CM/S
ECHO RV TAPSE: 2.4 CM (ref 1.7–?)
ECHO TV REGURGITANT MAX VELOCITY: 1.3 M/S
ECHO TV REGURGITANT PEAK GRADIENT: 7 MMHG
EPITH CASTS URNS QL MICRO: ABNORMAL /LPF
GLUCOSE UR STRIP.AUTO-MCNC: NEGATIVE MG/DL
HGB UR QL STRIP: NEGATIVE
KETONES UR QL STRIP.AUTO: ABNORMAL MG/DL
LEUKOCYTE ESTERASE UR QL STRIP.AUTO: ABNORMAL
NITRITE UR QL STRIP.AUTO: NEGATIVE
PH UR STRIP: 6 (ref 5–8)
PROT UR STRIP-MCNC: ABNORMAL MG/DL
RBC #/AREA URNS HPF: ABNORMAL /HPF (ref 0–5)
UA: UC IF INDICATED,UAUC: ABNORMAL
UROBILINOGEN UR QL STRIP.AUTO: 1 EU/DL (ref 0.2–1)
WBC URNS QL MICRO: ABNORMAL /HPF (ref 0–4)

## 2023-01-09 PROCEDURE — C8929 TTE W OR WO FOL WCON,DOPPLER: HCPCS

## 2023-01-09 PROCEDURE — 93005 ELECTROCARDIOGRAM TRACING: CPT

## 2023-01-09 PROCEDURE — 70496 CT ANGIOGRAPHY HEAD: CPT

## 2023-01-09 PROCEDURE — 74011000250 HC RX REV CODE- 250: Performed by: INTERNAL MEDICINE

## 2023-01-09 PROCEDURE — G0378 HOSPITAL OBSERVATION PER HR: HCPCS

## 2023-01-09 PROCEDURE — 96372 THER/PROPH/DIAG INJ SC/IM: CPT

## 2023-01-09 PROCEDURE — 70450 CT HEAD/BRAIN W/O DYE: CPT

## 2023-01-09 PROCEDURE — 99222 1ST HOSP IP/OBS MODERATE 55: CPT | Performed by: PSYCHIATRY & NEUROLOGY

## 2023-01-09 PROCEDURE — 74011000636 HC RX REV CODE- 636: Performed by: RADIOLOGY

## 2023-01-09 PROCEDURE — 74011250636 HC RX REV CODE- 250/636: Performed by: INTERNAL MEDICINE

## 2023-01-09 PROCEDURE — 74011250637 HC RX REV CODE- 250/637: Performed by: PSYCHIATRY & NEUROLOGY

## 2023-01-09 PROCEDURE — 74011250636 HC RX REV CODE- 250/636: Performed by: NURSE PRACTITIONER

## 2023-01-09 PROCEDURE — 74011250637 HC RX REV CODE- 250/637: Performed by: INTERNAL MEDICINE

## 2023-01-09 PROCEDURE — 81001 URINALYSIS AUTO W/SCOPE: CPT

## 2023-01-09 PROCEDURE — 96376 TX/PRO/DX INJ SAME DRUG ADON: CPT

## 2023-01-09 PROCEDURE — 96374 THER/PROPH/DIAG INJ IV PUSH: CPT

## 2023-01-09 PROCEDURE — 74011000250 HC RX REV CODE- 250: Performed by: NURSE PRACTITIONER

## 2023-01-09 PROCEDURE — 74011250637 HC RX REV CODE- 250/637: Performed by: STUDENT IN AN ORGANIZED HEALTH CARE EDUCATION/TRAINING PROGRAM

## 2023-01-09 RX ORDER — SODIUM CHLORIDE 9 MG/ML
75 INJECTION, SOLUTION INTRAVENOUS CONTINUOUS
Status: DISCONTINUED | OUTPATIENT
Start: 2023-01-09 | End: 2023-01-10

## 2023-01-09 RX ORDER — POTASSIUM CHLORIDE 750 MG/1
40 TABLET, FILM COATED, EXTENDED RELEASE ORAL
Status: COMPLETED | OUTPATIENT
Start: 2023-01-09 | End: 2023-01-09

## 2023-01-09 RX ORDER — ENOXAPARIN SODIUM 100 MG/ML
40 INJECTION SUBCUTANEOUS DAILY
Status: DISCONTINUED | OUTPATIENT
Start: 2023-01-09 | End: 2023-01-10 | Stop reason: HOSPADM

## 2023-01-09 RX ORDER — POLYETHYLENE GLYCOL 3350 17 G/17G
17 POWDER, FOR SOLUTION ORAL DAILY PRN
Status: DISCONTINUED | OUTPATIENT
Start: 2023-01-09 | End: 2023-01-10 | Stop reason: HOSPADM

## 2023-01-09 RX ORDER — LISINOPRIL 20 MG/1
20 TABLET ORAL DAILY
Status: DISCONTINUED | OUTPATIENT
Start: 2023-01-09 | End: 2023-01-10 | Stop reason: HOSPADM

## 2023-01-09 RX ORDER — HYDRALAZINE HYDROCHLORIDE 20 MG/ML
10 INJECTION INTRAMUSCULAR; INTRAVENOUS
Status: DISCONTINUED | OUTPATIENT
Start: 2023-01-09 | End: 2023-01-10 | Stop reason: HOSPADM

## 2023-01-09 RX ORDER — ATORVASTATIN CALCIUM 40 MG/1
80 TABLET, FILM COATED ORAL
Status: DISCONTINUED | OUTPATIENT
Start: 2023-01-09 | End: 2023-01-09

## 2023-01-09 RX ORDER — ACETAMINOPHEN 650 MG/1
650 SUPPOSITORY RECTAL
Status: DISCONTINUED | OUTPATIENT
Start: 2023-01-09 | End: 2023-01-10 | Stop reason: HOSPADM

## 2023-01-09 RX ORDER — POTASSIUM CHLORIDE 7.45 MG/ML
10 INJECTION INTRAVENOUS
Status: COMPLETED | OUTPATIENT
Start: 2023-01-09 | End: 2023-01-09

## 2023-01-09 RX ORDER — SODIUM CHLORIDE 0.9 % (FLUSH) 0.9 %
5-40 SYRINGE (ML) INJECTION EVERY 8 HOURS
Status: DISCONTINUED | OUTPATIENT
Start: 2023-01-09 | End: 2023-01-10 | Stop reason: HOSPADM

## 2023-01-09 RX ORDER — ACETAMINOPHEN 325 MG/1
650 TABLET ORAL
Status: DISCONTINUED | OUTPATIENT
Start: 2023-01-09 | End: 2023-01-10 | Stop reason: HOSPADM

## 2023-01-09 RX ORDER — ONDANSETRON 4 MG/1
4 TABLET, ORALLY DISINTEGRATING ORAL
Status: DISCONTINUED | OUTPATIENT
Start: 2023-01-09 | End: 2023-01-10 | Stop reason: HOSPADM

## 2023-01-09 RX ORDER — DONEPEZIL HYDROCHLORIDE 5 MG/1
5 TABLET, FILM COATED ORAL
Status: DISCONTINUED | OUTPATIENT
Start: 2023-01-09 | End: 2023-01-10 | Stop reason: HOSPADM

## 2023-01-09 RX ORDER — CLOPIDOGREL BISULFATE 75 MG/1
75 TABLET ORAL DAILY
Status: DISCONTINUED | OUTPATIENT
Start: 2023-01-09 | End: 2023-01-10 | Stop reason: HOSPADM

## 2023-01-09 RX ORDER — AMLODIPINE BESYLATE 5 MG/1
5 TABLET ORAL EVERY MORNING
Status: DISCONTINUED | OUTPATIENT
Start: 2023-01-09 | End: 2023-01-10 | Stop reason: HOSPADM

## 2023-01-09 RX ORDER — ATORVASTATIN CALCIUM 40 MG/1
80 TABLET, FILM COATED ORAL
Status: DISCONTINUED | OUTPATIENT
Start: 2023-01-09 | End: 2023-01-10 | Stop reason: HOSPADM

## 2023-01-09 RX ORDER — DONEPEZIL HYDROCHLORIDE 10 MG/1
10 TABLET, FILM COATED ORAL
Status: DISCONTINUED | OUTPATIENT
Start: 2023-02-08 | End: 2023-01-10 | Stop reason: HOSPADM

## 2023-01-09 RX ORDER — ONDANSETRON 2 MG/ML
4 INJECTION INTRAMUSCULAR; INTRAVENOUS
Status: DISCONTINUED | OUTPATIENT
Start: 2023-01-09 | End: 2023-01-10 | Stop reason: HOSPADM

## 2023-01-09 RX ORDER — GUAIFENESIN 100 MG/5ML
81 LIQUID (ML) ORAL DAILY
Status: DISCONTINUED | OUTPATIENT
Start: 2023-01-09 | End: 2023-01-09

## 2023-01-09 RX ORDER — SODIUM CHLORIDE 0.9 % (FLUSH) 0.9 %
5-40 SYRINGE (ML) INJECTION AS NEEDED
Status: DISCONTINUED | OUTPATIENT
Start: 2023-01-09 | End: 2023-01-10 | Stop reason: HOSPADM

## 2023-01-09 RX ADMIN — AMLODIPINE BESYLATE 5 MG: 5 TABLET ORAL at 07:01

## 2023-01-09 RX ADMIN — POTASSIUM CHLORIDE 40 MEQ: 750 TABLET, FILM COATED, EXTENDED RELEASE ORAL at 04:28

## 2023-01-09 RX ADMIN — SODIUM CHLORIDE 75 ML/HR: 9 INJECTION, SOLUTION INTRAVENOUS at 22:13

## 2023-01-09 RX ADMIN — POTASSIUM CHLORIDE 10 MEQ: 7.46 INJECTION, SOLUTION INTRAVENOUS at 14:46

## 2023-01-09 RX ADMIN — POTASSIUM CHLORIDE 10 MEQ: 7.46 INJECTION, SOLUTION INTRAVENOUS at 15:58

## 2023-01-09 RX ADMIN — LISINOPRIL 20 MG: 20 TABLET ORAL at 09:55

## 2023-01-09 RX ADMIN — SODIUM CHLORIDE, PRESERVATIVE FREE 10 ML: 5 INJECTION INTRAVENOUS at 14:00

## 2023-01-09 RX ADMIN — SODIUM CHLORIDE, PRESERVATIVE FREE 10 ML: 5 INJECTION INTRAVENOUS at 07:01

## 2023-01-09 RX ADMIN — ENOXAPARIN SODIUM 40 MG: 100 INJECTION SUBCUTANEOUS at 09:55

## 2023-01-09 RX ADMIN — IOPAMIDOL 100 ML: 755 INJECTION, SOLUTION INTRAVENOUS at 01:06

## 2023-01-09 RX ADMIN — SODIUM CHLORIDE 75 ML/HR: 9 INJECTION, SOLUTION INTRAVENOUS at 04:28

## 2023-01-09 RX ADMIN — DONEPEZIL HYDROCHLORIDE 5 MG: 5 TABLET, FILM COATED ORAL at 22:11

## 2023-01-09 RX ADMIN — CLOPIDOGREL BISULFATE 75 MG: 75 TABLET ORAL at 09:55

## 2023-01-09 RX ADMIN — PERFLUTREN 1 ML: 6.52 INJECTION, SUSPENSION INTRAVENOUS at 15:55

## 2023-01-09 RX ADMIN — ATORVASTATIN CALCIUM 80 MG: 40 TABLET, FILM COATED ORAL at 22:11

## 2023-01-09 RX ADMIN — POTASSIUM CHLORIDE 10 MEQ: 7.46 INJECTION, SOLUTION INTRAVENOUS at 12:39

## 2023-01-09 NOTE — ROUTINE PROCESS
Has a final transfer review been performed?  Yes    Reason for Admission: syncope  Patient comes from: home  Mental Status: Alert and oriented  ADL:self care  Ambulation:not walking at this time due to syncope  Pertinent Info/Safety Concerns: fall risk  COVID Status: Not Indicated  MEWS Score: 1       Vitals:    01/08/23 2347 01/09/23 0330 01/09/23 0400   BP: (!) 167/70 (!) 126/59 (!) 156/49   Pulse: 68 (!) 58 (!) 59   Resp: 18 16 16   Temp: 97.6 °F (36.4 °C)     SpO2: 100% 97% 96%     Lines:   Peripheral IV 01/08/23 Right Arm (Active)   Site Assessment Clean, dry, & intact 01/08/23 2318   Phlebitis Assessment 0 01/08/23 2318   Infiltration Assessment 0 01/08/23 2318   Dressing Status Clean, dry, & intact 01/08/23 2318   Dressing Type Transparent 01/08/23 2318   Hub Color/Line Status Pink;Flushed;Patent 01/08/23 2318   Action Taken Blood drawn 01/08/23 2318      Transport mode:ED [facility]  being transferred to (unit) for routine progression of care     \"Notification of etransfer note given to (Name) Radha Raymond (Title) ANGELA

## 2023-01-09 NOTE — PROGRESS NOTES
Transition of Care Plan  RUR- OBSERVATION   DISPOSITION: The disposition plan is home with family assistance; pending medical progression  F/U with PCP/Specialist    Transport: Family   Barrier; Medical, Vascular Surgery recs,   Admitting DX: Syncope and collapse  Per H&P: rule out any underlying significant dysrhythmias contributing to patient's syncope while awaiting vascular surgery evaluation for significant right carotid obstructive disease. No PT/OT orders at this time     Main Contact; POA- Daughter Cesar Blizzard [phone]  - [facility] on File     Care Management Interventions  Mode of Transport at Discharge:  Other (see comment)  Transition of Care Consult (CM Consult): Discharge Planning  MyChart Signup: Yes  Physical Therapy Consult: No  Occupational Therapy Consult: No  Support Systems: Other Family Member(s), Child(debbie)  Confirm Follow Up Transport: Family  Discharge Location  Patient Expects to be Discharged to[de-identified] Home      CM: [address] [place]. REBECCA,   [phone]

## 2023-01-09 NOTE — CONSULTS
Neurology Consult Note     NAME: Jasmin Brown   :  1940   MRN:  [id number]   DATE:  2023       HPI:  Pt is an 80yo RH female admitted 23 for syncope. The pt has no memory of this event, does have moderate dementia per daughter. Pt's daughter witnessed the event, she reports that the pt had just showered and was found standing in the doorway of her room a few steps from the shower, unclothed, leaning on the frame, not responding with eyes open, she was lowered to the ground, c/o being lightheaded and remained sitting due to vomiting. When she stopped vomiting she was at baseline and stated she wanted to get in the bed and they helped her walk to the bed. She never had LOC that they could tell, no seizure activity, no b/b incontinence. Her SBP after sitting, was 165. Pt was admitted to a hospital in Oct, 2022 while still living in [place], had AMS, found to have a UTI, but also a stroke. Daughter reports that they were told she needed cardiac monitoring after d/c, but b/c she was moving to [place] to live with her daughter, this was not set up. She was diagnosed with dementia in [place], but again, due to upcoming move, no medications were started. After her d/c from the hospital in Oct, she was at home and had just gotten out of the shower and had a similar event which prompted another hospital visit, she was found to have bilateral ICA stenosis, and all BP meds were stopped. Her BP is typically in the 160's and they have been told her goal SBP is 140-190. She had recurrent syncope 22 with SBP in 120's, again occurred after a shower, and was admitted, s/p left ICA TCAR on 23, right ICA intervention planned for several weeks from now. She is on ASA 81mg and Plavix 75mg daily and lipitor 80mg daily at home. CTH neg. CTA H/N right ICA 82% stenosis, left none. TTE is being done currently. The pt has not had any spells of staring outside of these above mentioned events, no h/o seizure, no family h/o seizure. PMH:  TCAR for left ICA stenosis 1/2/23  CAD  HTN  HLD  Dementia  Breast CA  DM  CVA  S/p laura  S/p cataract surgery bilaterally  S/p hysterectomy, ovaries intact      ROS:  Per HPI or PMH o/w neg and somewhat limited due to dementia. MEDS:  Home:  Prior to Admission Medications   Prescriptions Last Dose Informant Patient Reported? Taking? EC ASPIRIN PO   Yes No   Sig: Take 81 mg by mouth. amLODIPine (NORVASC) 5 mg tablet   Yes No   Sig: Take 5 mg by mouth Every morning. atorvastatin (LIPITOR) 80 mg tablet   Yes No   Sig: Take 80 mg by mouth nightly. clopidogreL (PLAVIX) 75 mg tab   Yes No   Sig: Take  by mouth Every morning. lisinopriL (PRINIVIL, ZESTRIL) 20 mg tablet   Yes No   Sig: Take  by mouth Every morning. tetrahydrozoline/polyethyl gly (EYE DROPS OP)   Yes No   Sig: Apply  to eye as needed.       Facility-Administered Medications: None     Current Facility-Administered Medications:     sodium chloride (NS) flush 5-40 mL, 5-40 mL, IntraVENous, Q8H, Behzad Barnes MD, 10 mL at 01/09/23 1400    sodium chloride (NS) flush 5-40 mL, 5-40 mL, IntraVENous, PRN, Nikos Edouard MD    acetaminophen (TYLENOL) tablet 650 mg, 650 mg, Oral, Q6H PRN **OR** acetaminophen (TYLENOL) suppository 650 mg, 650 mg, Rectal, Q6H PRN, Nikos Edouard MD    polyethylene glycol (MIRALAX) packet 17 g, 17 g, Oral, DAILY PRN, Nikos Edouard MD    ondansetron (ZOFRAN ODT) tablet 4 mg, 4 mg, Oral, Q8H PRN **OR** ondansetron (ZOFRAN) injection 4 mg, 4 mg, IntraVENous, Q6H PRN, Behzad Medrano MD    enoxaparin (LOVENOX) injection 40 mg, 40 mg, SubCUTAneous, DAILY, Behzad Barnes MD, 40 mg at 01/09/23 0955    0.9% sodium chloride infusion, 75 mL/hr, IntraVENous, CONTINUOUS, Nikos Edouard MD, Last Rate: 75 mL/hr at 01/09/23 0428, 75 mL/hr at 01/09/23 0428    amLODIPine (NORVASC) tablet 5 mg, 5 mg, Oral, QAM, Deyanira Angeles MD, 5 mg at 23 0701    clopidogreL (PLAVIX) tablet 75 mg, 75 mg, Oral, DAILY, Behzad Barnes MD, 75 mg at 23 0955    lisinopriL (PRINIVIL, ZESTRIL) tablet 20 mg, 20 mg, Oral, DAILY, Behzad Barnes MD, 20 mg at 23 1363    polyvinyl alcohol-povidone (NATURAL TEARS) 0.5-0.6 % ophthalmic solution 1 Drop, 1 Drop, Both Eyes, DAILY PRN, Deyanira Angeles MD    hydrALAZINE (APRESOLINE) 20 mg/mL injection 10 mg, 10 mg, IntraVENous, Q6H PRN, Deyanira Angeles MD    atorvastatin (LIPITOR) tablet 80 mg, 80 mg, Oral, QHS, Behzad Barnes MD    potassium chloride 10 mEq in 100 ml IVPB, 10 mEq, IntraVENous, Q1H, Danish Rodríguez NP, Last Rate: 100 mL/hr at 23 1446, 10 mEq at 23 1446      No Known Allergies      SH: , moved to [place] to live with her daughter in 2022. 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 Quit Tob   No E/D    FH:  M- PD, dementia  F - GI CA  Sis- breast CA, PD, dementia  Bro - PD    PHYSICAL EXAM:    Visit Vitals  /68 (BP 1 Location: Right upper arm, BP Patient Position: Sitting)   Pulse (!) 56   Temp 98.1 °F (36.7 °C)   Resp 17   Wt 144 lb 12.8 oz (65.7 kg)   SpO2 97%   BMI 28.28 kg/m²     Temp (24hrs), Av.9 °F (36.6 °C), Min:97.5 °F (36.4 °C), Max:98.3 °F (36.8 °C)    General: Well developed well nourished patient in no apparent distress. Cardiac: Regular rhythm, bradycardic, with no murmurs. Carotids: 2+ symmetric, no bruits  Extremities: 2+ Radial pulses, no cyanosis or edema    Neurological Exam:  Mental Status: Oriented to time, place and person. Speech and language intact. Attention and fund of knowledge appropriate. Memory impaired. Cranial Nerves:   VFF, PERRL, EOMI, no nystagmus, no diplopia, no ptosis. Facial movement is symmetric. Palate is midline. Tongue is midline. Motor:  5/5 strength in UE, remainder deferred due to echo in progress. No tremors   Reflexes: Toes downgoing. Sensory:   Intact to LT   Gait:  Deferred   Cerebellar:           STUDIES AND REPORTS:  Recent Results (from the past 24 hour(s))   CBC WITH AUTOMATED DIFF    Collection Time: 01/08/23 11:14 PM   Result Value Ref Range    WBC 8.6 3.6 - 11.0 K/uL    RBC 3.90 3.80 - 5.20 M/uL    HGB 12.2 11.5 - 16.0 g/dL    HCT 36.2 35.0 - 47.0 %    MCV 92.8 80.0 - 99.0 FL    MCH 31.3 26.0 - 34.0 PG    MCHC 33.7 30.0 - 36.5 g/dL    RDW 12.7 11.5 - 14.5 %    PLATELET 676 429 - 996 K/uL    MPV 10.8 8.9 - 12.9 FL    NRBC 0.0 0  WBC    ABSOLUTE NRBC 0.00 0.00 - 0.01 K/uL    NEUTROPHILS 83 (H) 32 - 75 %    LYMPHOCYTES 13 12 - 49 %    MONOCYTES 3 (L) 5 - 13 %    EOSINOPHILS 0 0 - 7 %    BASOPHILS 0 0 - 1 %    IMMATURE GRANULOCYTES 1 (H) 0.0 - 0.5 %    ABS. NEUTROPHILS 7.3 1.8 - 8.0 K/UL    ABS. LYMPHOCYTES 1.1 0.8 - 3.5 K/UL    ABS. MONOCYTES 0.2 0.0 - 1.0 K/UL    ABS. EOSINOPHILS 0.0 0.0 - 0.4 K/UL    ABS. BASOPHILS 0.0 0.0 - 0.1 K/UL    ABS. IMM. GRANS. 0.0 0.00 - 0.04 K/UL    DF AUTOMATED     METABOLIC PANEL, COMPREHENSIVE    Collection Time: 01/08/23 11:14 PM   Result Value Ref Range    Sodium 141 136 - 145 mmol/L    Potassium 3.2 (L) 3.5 - 5.1 mmol/L    Chloride 111 (H) 97 - 108 mmol/L    CO2 23 21 - 32 mmol/L    Anion gap 7 5 - 15 mmol/L    Glucose 133 (H) 65 - 100 mg/dL    BUN 17 6 - 20 MG/DL    Creatinine 0.82 0.55 - 1.02 MG/DL    BUN/Creatinine ratio 21 (H) 12 - 20      eGFR >60 >60 ml/min/1.73m2    Calcium 9.1 8.5 - 10.1 MG/DL    Bilirubin, total 0.7 0.2 - 1.0 MG/DL    ALT (SGPT) 34 12 - 78 U/L    AST (SGOT) 33 15 - 37 U/L    Alk.  phosphatase 86 45 - 117 U/L    Protein, total 6.8 6.4 - 8.2 g/dL    Albumin 3.3 (L) 3.5 - 5.0 g/dL    Globulin 3.5 2.0 - 4.0 g/dL    A-G Ratio 0.9 (L) 1.1 - 2.2     TROPONIN-HIGH SENSITIVITY    Collection Time: 01/08/23 11:14 PM   Result Value Ref Range    Troponin-High Sensitivity 11 0 - 51 ng/L   MAGNESIUM    Collection Time: 01/08/23 11:14 PM   Result Value Ref Range    Magnesium 1.7 1.6 - 2.4 mg/dL   SAMPLES BEING HELD    Collection Time: 01/08/23 11:14 PM   Result Value Ref Range    SAMPLES BEING HELD 1RED 1BLUE     COMMENT        Add-on orders for these samples will be processed based on acceptable specimen integrity and analyte stability, which may vary by analyte. MRI Results (most recent):  No results found for this or any previous visit. CT Results (most recent):  Results from Hospital Encounter encounter on 01/09/23    CTA HEAD NECK W CONT    Narrative  EXAM: CTA HEAD NECK W CONT    INDICATION: evaluate for carotid stenosis or acute embolus    COMPARISON: None. CONTRAST: 100 mL of Isovue 370. TECHNIQUE:  Unenhanced  images were obtained to localize the volume for  acquisition. Multislice helical axial CT angiography was performed from the  aortic arch to the top of the head during uneventful rapid bolus intravenous  contrast administration. Coronal and sagittal reformations and 3D post  processing was performed. CT dose reduction was achieved through use of a  standardized protocol tailored for this examination and automatic exposure  control for dose modulation. FINDINGS:    CTA NECK  There is conventional three vessel arch anatomy. Status post stent placement in  the left internal carotid artery origin without significant stenosis. There is  atherosclerotic disease with severe stenosis of the proximal right internal  carotid artery. % of right carotid artery stenosis: 82%  % of left carotid artery stenosis: No significant stenosis    NASCET method was utilized for calculating stenosis. The vertebral arteries are codominant and patent. Hypodensity in the right  thyroid gland. .  There are degenerative changes of the cervical spine. CTA HEAD  The vertebral arteries are codominant. Diffuse atherosclerotic disease is noted  in the V4 segment proximally. Vessels are otherwise patent. . The basilar artery  and its branches are normal. Disease involving at least moderate stenosis in the  cavernous internal carotid arteries. . There is no flow-limiting intracranial  stenosis. There is no aneurysm. There are no sizable posterior communicating  arteries. Impression  1. No large vessel occlusion. 2.  There is severe stenosis of the origin of the right internal carotid artery. Other atherosclerotic disease as described. .          Assessment and Plan:   Pt is an 80yo RH female with dementia, h/o CVA 10/2022, and severe bilateral ICA stenosis s/p left TCAR 1/2/23 with recurrent spells of syncope or near syncope after showering and while standing, with prodrome on at least one occasion of lightheadedness, with known drop in BP with at least one event. CTH neg. CTA H/N right ICA 82% stenosis, left none. Exam with bradycardia, mild memory impairment, o/w non-focal. No suspicion for seizure or primary neurological etiology for syncope/near syncope. Suspect orthostatic hypotension given that these event have occurred after showering and has had documented relative hypotension with one event on background of bilateral ICA stenosis. DDx includes cardiac arrhythmia and currently pt is bradycardic. TTE is pending. Pt needs a 30 day event monitor at discharge if work up is unremarkable. Pt is encouraged to increase water intake to 48-64oz a day. From a dementia standpoint, pt has an appt with Dr. Dallas Rollins in Neurology in order to establish care. Will start Aricept 5mg daily x 1 month, then 10mg daily, side effects and goals of therapy discussed. Discussed establishing a schedule including sleep schedule and physical activity. Please call if needed. Signed: Jake Anaya MD

## 2023-01-09 NOTE — PROGRESS NOTES
Problem: Falls - Risk of  Goal: *Absence of Falls  Description: Document Darlen Adams Fall Risk and appropriate interventions in the flowsheet.   Outcome: Progressing Towards Goal  Note: Fall Risk Interventions:

## 2023-01-09 NOTE — ED TRIAGE NOTES
Triage Note: Patient arrives by EMS following a witnessed syncopal episode after getting out of the shower. Patient did not hit her head. . VSS in route.

## 2023-01-09 NOTE — PROGRESS NOTES
Medicare Outpatient Observation Notice (MOON)/provided to patient/representative with verbal explanation of the notice. Time allotted for questions regarding the notice. Patient /representative provided a completed copy of the MOON/ notice. Copy placed on bedside chart.

## 2023-01-09 NOTE — CONSULTS
Consult    Patient: Tash Hopper MRN: [id number]  SSN: [id number]    YOB: 1940  Age: 81 y.o. Sex: female      Subjective:      Tash Hopper is a 81 y.o. female who is being seen for evaluation of high grade R carotid artery stenosis.    L side was symptomatic and is recently s/p TCAR    Past Medical History:   Diagnosis Date    Breast CA ([facility] [address]     LEFT    CAD (coronary artery disease)     [facility]     Dementia ([facility]     POSSIBLE-BEING EVALUATED    Diabetes ([facility]     PT STATES ISN'T DIABETIC ANYMORE    Hypertension     Psychiatric disorder     Stroke [facility])      Past Surgical History:   Procedure Laterality Date    HX CATARACT REMOVAL Bilateral 2018    HX CHOLECYSTECTOMY      HX ORTHOPAEDIC      SHOULDER FRACTURE    HX PARTIAL HYSTERECTOMY        Family History   Problem Relation Age of Onset    Parkinson's Disease Mother     Cancer Father         GI    Cancer Sister         BREAST    Parkinson's Disease Sister     Parkinson's Disease Brother     Other Brother         HEALTH PROBS R/T WAR    Anesth Problems Neg Hx      Social History     Tobacco Use    Smoking status: Former     Packs/day: 1.00     Years: 38.00     Pack years: 38.00     Types: Cigarettes     Quit date:      Years since quittin.0    Smokeless tobacco: Never   Substance Use Topics    Alcohol use: Not Currently      Current Facility-Administered Medications   Medication Dose Route Frequency Provider Last Rate Last Admin    sodium chloride (NS) flush 5-40 mL  5-40 mL IntraVENous Q8H Lucas Hernandez MD   10 mL at 23 0701    sodium chloride (NS) flush 5-40 mL  5-40 mL IntraVENous PRN Lucas Hernandez MD        acetaminophen (TYLENOL) tablet 650 mg  650 mg Oral Q6H PRN Lucas Hernandez MD        Or    acetaminophen (TYLENOL) suppository 650 mg  650 mg Rectal Q6H PRN Lucas Hernandez MD        polyethylene glycol (MIRALAX) packet 17 g  17 g Oral DAILY PRN Lucas Hernandez MD        ondansetron (ZOFRAN ODT) tablet 4 mg 4 mg Oral Q8H PRN Lucina Esteban MD        Or    ondansetron [facility]) injection 4 mg  4 mg IntraVENous Q6H PRN Lucina Esteban MD        enoxaparin (LOVENOX) injection 40 mg  40 mg SubCUTAneous DAILY Lucina Esteban MD   40 mg at 01/09/23 0955    0.9% sodium chloride infusion  75 mL/hr IntraVENous CONTINUOUS Lucina Esetban MD 75 mL/hr at 01/09/23 0428 75 mL/hr at 01/09/23 0428    amLODIPine (NORVASC) tablet 5 mg  5 mg Oral Dewey Rizzo MD   5 mg at 01/09/23 0701    clopidogreL (PLAVIX) tablet 75 mg  75 mg Oral DAILY Lucina Esteban MD   75 mg at 01/09/23 0955    lisinopriL (PRINIVIL, ZESTRIL) tablet 20 mg  20 mg Oral DAILY Lucina Esteban MD   20 mg at 01/09/23 6880    polyvinyl alcohol-povidone (NATURAL TEARS) 0.5-0.6 % ophthalmic solution 1 Drop  1 Drop Both Eyes DAILY PRN Lucina Esteban MD        hydrALAZINE (APRESOLINE) 20 mg/mL injection 10 mg  10 mg IntraVENous Q6H PRN Lucina Esteban MD        atorvastatin (LIPITOR) tablet 80 mg  80 mg Oral QHS Lucina Esteban MD            No Known Allergies    Review of Systems:  A comprehensive review of systems was negative except for that written in the History of Present Illness. Objective:     Vitals:    01/09/23 0532 01/09/23 0542 01/09/23 0811 01/09/23 1000   BP: (!) 166/69 (!) 177/72 137/63    Pulse: 82 80 69 61   Resp:   17    Temp:   97.5 °F (36.4 °C)    SpO2:   98%    Weight:            Physical Exam:  General: Patient is pleasant and cooperative and appears to be in no acute distress. HEENT: Normocephalic atraumatic. Appropriate tracking. No scleral icterus. Nares patent. Trachea is midline. Chest: Unlabored respirations. Symmetrical chest expansion. Cardiac: Regular rate and rhythm. Acyanotic  Abdomen: Abdomen is soft, nontender, nondistended. Extremities: Moves all extremities. Vascular: L neck incision  well healed. Neuro grossly intact.     Assessment:     Hospital Problems  Date Reviewed: 12/6/2022            Codes Class Noted POA    Syncope and collapse ICD-10-CM: R55  ICD-9-CM: 780.2  1/9/2023 Unknown           Plan:     Patient has a symptomatic L side ICA stenosis s/p L TCAR and an asymptomatic R sided stenosis. Current plan remains to perform R sided TCAR around the 8 week post-op crissy as earlier places her at increased risk for cerebral hyperperfusion. Current presentation with symptoms not likely attributed to the carotid vessel so at present no change of plans. I do agree with plan as outlined by Dr Fay Thomas on 1/9 to complete syncopal work-up in full. --Continue ASA Plavix and statin    Vascular surgery will continue to follow while she remains inpatient.       Signed By: Franck Hollins MD     January 9, 2023

## 2023-01-09 NOTE — PROGRESS NOTES
I evaluated this patient, key findings were discussed with the Advanced Practice Provider  Key components of clinical history, HPI, social history, allergies, past medical history, past surgical history, physical exam and medical decision making were discussed  On exam  Gen: NAD  CV: No bruit   key components of medical care were performed. Patient hospitalized for Syncope and collapse [R55]  Care plan discussed with TETE, key components of MDM performed  Please see TETE note for details. Agree with TETE assessment and Plan     Brenda Figueredo MD           [address] [facility] Adult  Hospitalist Group                                                                                          Hospitalist Progress Note  Estefany Amaral NP  Answering service: [phone] from in house phone        Date of Service:  2023  NAME:  Fransisco Gurrola  :  1940  MRN:  [id number]      Admission Summary:   Patient is an 81year old  female with a PMH of CAD, HTN, Hx of CVA, Bilateral Obstructive Carotid Disease with multiple Syncopal episodes, s/p recent Left TCAR 2022, on dual antiplatelet therapy and high intensity Lipitor who presented to the ED via EMS for eval after she became dizzy after a shower, causing her use the wall for support, she moved to the floor along the wall without hitting her head and became unconscious for few seconds without responding to the daughter who witnessed the event. Daughter reported x1 episode of vomiting while she was unresponsive without any noted tongue biting or urinary incontinence and confusion afterwards. Patient's daughter reported recent onset of patient having episodes of staring with unresponsiveness. Patient ha returned to baseline AAOx3 upon presentation to ED. Work up in ED revealed abnormal CTA head/neck as outlined below. Hospitalist service consulted for admit, further work up and treatment. Interval history / Subjective:    Follow-up for issues listed below.   -Patient seen and examined, no c/o's. Daughter at bedside. Assessment & Plan:     Syncopal Episode:   - tele  - ECHO 1/9: Normal LVSF EF 60 - 65%. Left ventricle size is normal. Normal wall thickness. Normal wall motion. Diastolic dysfunction present with normal LV EF.  - UA 1/9: trace protein, ketone trace, leukocyte esterase trace, Ca Oxalate crystals 1+  - supplemental O2 prn  - Neuro checks  - Neuro eval  - PT/OT  - B12, folate, TSH, iron panel, ferritin    Bilateral Obstructive Carotid Disease: 12/30 s/p LCA revascularization with stent placement. - CTA H/N 1/9/23: Neck: Status post stent placement left internal carotid artery origin without significant stenosis. There is atherosclerotic disease with severe stenosis of the proximal right internal carotid artery, right carotid artery stenosis: 82%, left carotid artery stenosis: No significant stenosis. Head: Disease involving at least moderate stenosis in the cavernous internal carotid arteries. There is no flow-limiting intracranial stenosis. There is no aneurysm. There are no sizable posterior communicating arteries. - Vascular Surgery: \"plan remains to perform R sided TCAR around the 8 week post-op crissy as earlier places her at increased risk for cerebral hyperperfusion\"      HTN: continue home regimen  HLD: continue dual platelet therapy, ASA/Plavix and high dose lipitor  Hx CVA: 10/2022. Hx Breast Ca: s/p radiation and lumpectomy 2008. DVTppx: lovenox  Code Status: Full Code  Diet: Cardiac  Activity: OOB to chair TID and PRN  Discharge: home  Ambulates: independent      Hospital Problems  Date Reviewed: 12/6/2022            Codes Class Noted POA    Syncope and collapse ICD-10-CM: R55  ICD-9-CM: 780.2  1/9/2023 Unknown             Review of Systems:   A comprehensive review of systems was negative except for that written in the HPI. Vital Signs:    Last 24hrs VS reviewed since prior progress note.  Most recent are:  Visit Vitals  BP (!) 177/72 (BP 1 Location: Right upper arm, BP Patient Position: Standing)   Pulse 80   Temp 98.3 °F (36.8 °C)   Resp 18   Wt 65.7 kg (144 lb 12.8 oz)   SpO2 97%   BMI 28.28 kg/m²         Intake/Output Summary (Last 24 hours) at 1/9/2023 0745  Last data filed at 1/9/2023 3457  Gross per 24 hour   Intake 75 ml   Output --   Net 75 ml        Physical Examination:     I had a face to face encounter with this patient and independently examined them on 1/9/2023 as outlined below:          Constitutional:  No acute distress, cooperative, pleasant    ENT:  Oral mucosa moist.    Resp:  CTA bilaterally. No wheezing/rhonchi/rales. No accessory muscle use. CV:  Regular rhythm, normal rate, S1,S2.    GI/:  Soft, non distended, non tender, no guarding, BS present. Voids Freely. Musculoskeletal:  No edema, warm. Neurologic:  Moves all extremities. AAOx3. Skin:  w/d. Psych:  Not anxious nor agitated. Data Review:    Review and/or order of clinical lab test      Labs:     Recent Labs     01/08/23  2314   WBC 8.6   HGB 12.2   HCT 36.2        Recent Labs     01/08/23  2314      K 3.2*   *   CO2 23   BUN 17   CREA 0.82   *   CA 9.1   MG 1.7     Recent Labs     01/08/23  2314   ALT 34   AP 86   TBILI 0.7   TP 6.8   ALB 3.3*   GLOB 3.5     No results for input(s): INR, PTP, APTT, INREXT in the last 72 hours. No results for input(s): FE, TIBC, PSAT, FERR in the last 72 hours. No results found for: FOL, RBCF   No results for input(s): PH, PCO2, PO2 in the last 72 hours. No results for input(s): CPK, CKNDX, TROIQ in the last 72 hours.     No lab exists for component: CPKMB  No results found for: CHOL, CHOLX, CHLST, CHOLV, HDL, HDLP, LDL, LDLC, DLDLP, TGLX, TRIGL, TRIGP, CHHD, CHHDX  Lab Results   Component Value Date/Time    Glucose (POC) 97 12/30/2022 09:20 AM     Lab Results   Component Value Date/Time    Color YELLOW/STRAW 12/30/2022 06:31 AM    Appearance CLEAR 12/30/2022 06:31 AM    Specific gravity 1.013 12/30/2022 06:31 AM    pH (UA) 6.0 12/30/2022 06:31 AM    Protein Negative 12/30/2022 06:31 AM    Glucose Negative 12/30/2022 06:31 AM    Ketone Negative 12/30/2022 06:31 AM    Bilirubin Negative 12/30/2022 06:31 AM    Urobilinogen 0.2 12/30/2022 06:31 AM    Nitrites Negative 12/30/2022 06:31 AM    Leukocyte Esterase MODERATE (A) 12/30/2022 06:31 AM    Epithelial cells FEW 12/30/2022 06:31 AM    Bacteria Negative 12/30/2022 06:31 AM    WBC 5-10 12/30/2022 06:31 AM    RBC 0-5 12/30/2022 06:31 AM         Medications Reviewed:     Current Facility-Administered Medications   Medication Dose Route Frequency    sodium chloride (NS) flush 5-40 mL  5-40 mL IntraVENous Q8H    sodium chloride (NS) flush 5-40 mL  5-40 mL IntraVENous PRN    acetaminophen (TYLENOL) tablet 650 mg  650 mg Oral Q6H PRN    Or    acetaminophen (TYLENOL) suppository 650 mg  650 mg Rectal Q6H PRN    polyethylene glycol (MIRALAX) packet 17 g  17 g Oral DAILY PRN    ondansetron (ZOFRAN ODT) tablet 4 mg  4 mg Oral Q8H PRN    Or    ondansetron (ZOFRAN) injection 4 mg  4 mg IntraVENous Q6H PRN    enoxaparin (LOVENOX) injection 40 mg  40 mg SubCUTAneous DAILY    0.9% sodium chloride infusion  75 mL/hr IntraVENous CONTINUOUS    amLODIPine (NORVASC) tablet 5 mg  5 mg Oral QAM    clopidogreL (PLAVIX) tablet 75 mg  75 mg Oral DAILY    lisinopriL (PRINIVIL, ZESTRIL) tablet 20 mg  20 mg Oral DAILY    polyvinyl alcohol-povidone (NATURAL TEARS) 0.5-0.6 % ophthalmic solution 1 Drop  1 Drop Both Eyes DAILY PRN    hydrALAZINE (APRESOLINE) 20 mg/mL injection 10 mg  10 mg IntraVENous Q6H PRN    atorvastatin (LIPITOR) tablet 80 mg  80 mg Oral QHS     ______________________________________________________________________  EXPECTED LENGTH OF STAY: - - -  ACTUAL LENGTH OF STAY:          0                 Danish Rodríguez NP

## 2023-01-09 NOTE — H&P
Hospitalist Admission Note    NAME: Jerilyn Easton   :  1940   MRN:  [id number]     Date/Time:  2023 3:07 AM    Patient PCP: Unknown, Unknown  __________________________________________________________________  Chief complaint on presentation:  Passing out for few seconds yesterday while walking from the bathroom to the bedroom after taking a shower  History of present illness:  Patient is a very pleasant 81years old  female with past medical history significant for coronary artery disease, hypertension, history of CVA, hemodynamically significant bilateral obstructive carotid disease with multiple syncopal episodes, status post recent left TCAR almost a week ago, being on DAPT along with high intensity Lipitor who happened to be in her usual state of health since revascularization and stenting, until yesterday when after taking shower, going to the bedroom from the bathroom suddenly got very dizzy, held on to the wall for support and leaned down without hitting her head and becoming unconscious for few seconds without responding to the daughter who witnessed the whole event. As per daughter, patient had a staring spell without shutting the eyes and 1 episode of vomiting while she was unresponsive without any noted tongue biting, urinary incontinence but with some confusion afterwards. Because of the recurrences of these episodes more frequently, family got concerned and called EMS and brought the patient to the emergency room where on arrival patient was noted to be completely alert, awake and oriented without any fever with a temp of 97.6, borderline bradycardia at 58, normotensive with blood pressure of 126/59 with a well-maintained SPO2 of 96% on room air. Further imaging with head CT showed no acute intracranial abnormality besides chronic small vessel ischemic disease.   CTA of the neck showed severe stenosis of the origin of the right internal carotid artery at 82% but recently placed stent in the left internal carotid was found to be patent without any stenosis. Labs including CBC and serum chemistry were pretty much within normal limit except mild hypokalemia at 3.2 with normal renal function. Patient is being admitted to the floor for further evaluation by the vascular surgery in the morning. Past Medical History:   Diagnosis Date    Breast CA ([facility]     LEFT    CAD (coronary artery disease)     Cancer (HCC)     Dementia (HCC)     POSSIBLE-BEING EVALUATED    Diabetes ([facility]     PT STATES ISN'T DIABETIC ANYMORE    Hypertension     Psychiatric disorder     Stroke [facility])       Past Surgical History:   Procedure Laterality Date    HX CATARACT REMOVAL Bilateral 2018    HX CHOLECYSTECTOMY      HX ORTHOPAEDIC      SHOULDER FRACTURE    HX PARTIAL HYSTERECTOMY       Social History     Tobacco Use    Smoking status: Former     Packs/day: 1.00     Years: 38.00     Pack years: 38.00     Types: Cigarettes     Quit date:      Years since quittin.0    Smokeless tobacco: Never   Substance Use Topics    Alcohol use: Not Currently      Family History   Problem Relation Age of Onset    Parkinson's Disease Mother     Cancer Father         GI    Cancer Sister         BREAST    Parkinson's Disease Sister     Parkinson's Disease Brother     Other Brother         HEALTH PROBS R/T WAR    Anesth Problems Neg Hx        No Known Allergies     Prior to Admission medications    Medication Sig Start Date End Date Taking? Authorizing Provider   EC ASPIRIN PO Take 81 mg by mouth. Provider, Historical   tetrahydrozoline/polyethyl gly (EYE DROPS OP) Apply  to eye as needed. Provider, Historical   lisinopriL (PRINIVIL, ZESTRIL) 20 mg tablet Take  by mouth Every morning. Provider, Historical   amLODIPine (NORVASC) 5 mg tablet Take 5 mg by mouth Every morning. Provider, Historical   clopidogreL (PLAVIX) 75 mg tab Take  by mouth Every morning.     Provider, Historical   atorvastatin (LIPITOR) 80 mg tablet Take 80 mg by mouth nightly. Provider, Historical     REVIEW OF SYSTEMS:  See HPI for details  General: negative for fever, chills, sweats, weakness, weight loss  Eyes: negative for blurred vision, eye pain, loss of vision, diplopia  Ear Nose and Throat: negative for rhinorrhea, pharyngitis, otalgia, tinnitus, speech or swallowing difficulties  Respiratory:  negative for pleuritic pain, cough, sputum production, wheezing, SOB, LOYOLA  Cardiology:  negative for chest pain, palpitations, orthopnea, PND, edema, syncope   Gastrointestinal: Positive for vomiting x1 negative for abdominal pain,  Genitourinary: negative for frequency, urgency, dysuria, hematuria, incontinence  Muskuloskeletal : negative for arthralgia, myalgia  Hematology: negative for easy bruising, bleeding, lymphadenopathy  Dermatological: negative for rash, ulceration, mole change, new lesion  Endocrine: negative for hot flashes or polydipsia  Neurological: Positive for loss of consciousness earlier  Psychological: negative for anxiety, depression, agitation  Visit Vitals  BP (!) 167/70   Pulse 68   Temp 97.6 °F (36.4 °C)   Resp 18   SpO2 100%   Physical Exam:  General: Elderly, alert and oriented female, well-nourished, in no acute distress  HEENT:  Pink conjunctivae, PERRL, hearing intact to voice, moist mucous membranes  Neck: Supple, without masses, thyroid non-tender  Resp: No accessory muscle use, clear breath sounds without wheezes rales or rhonchi  Card: No murmurs, normal S1, S2 without thrills, bruits or peripheral edema  Abd:  Soft, non-tender, non-distended.   Lymph:  No cervical or inguinal adenopathy  Musc: No cyanosis or clubbing  Skin: No rashes or ulcers, skin turgor is good  Neuro:  Cranial nerves are grossly intact, no focal motor weakness, follows commands appropriately  LAB DATA REVIEWED:    Recent Results (from the past 24 hour(s))   CBC WITH AUTOMATED DIFF    Collection Time: 01/08/23 11:14 PM   Result Value Ref Range    WBC 8.6 3.6 - 11.0 K/uL    RBC 3.90 3.80 - 5.20 M/uL    HGB 12.2 11.5 - 16.0 g/dL    HCT 36.2 35.0 - 47.0 %    MCV 92.8 80.0 - 99.0 FL    MCH 31.3 26.0 - 34.0 PG    MCHC 33.7 30.0 - 36.5 g/dL    RDW 12.7 11.5 - 14.5 %    PLATELET 058 578 - 592 K/uL    MPV 10.8 8.9 - 12.9 FL    NRBC 0.0 0  WBC    ABSOLUTE NRBC 0.00 0.00 - 0.01 K/uL    NEUTROPHILS 83 (H) 32 - 75 %    LYMPHOCYTES 13 12 - 49 %    MONOCYTES 3 (L) 5 - 13 %    EOSINOPHILS 0 0 - 7 %    BASOPHILS 0 0 - 1 %    IMMATURE GRANULOCYTES 1 (H) 0.0 - 0.5 %    ABS. NEUTROPHILS 7.3 1.8 - 8.0 K/UL    ABS. LYMPHOCYTES 1.1 0.8 - 3.5 K/UL    ABS. MONOCYTES 0.2 0.0 - 1.0 K/UL    ABS. EOSINOPHILS 0.0 0.0 - 0.4 K/UL    ABS. BASOPHILS 0.0 0.0 - 0.1 K/UL    ABS. IMM. GRANS. 0.0 0.00 - 0.04 K/UL    DF AUTOMATED     METABOLIC PANEL, COMPREHENSIVE    Collection Time: 01/08/23 11:14 PM   Result Value Ref Range    Sodium 141 136 - 145 mmol/L    Potassium 3.2 (L) 3.5 - 5.1 mmol/L    Chloride 111 (H) 97 - 108 mmol/L    CO2 23 21 - 32 mmol/L    Anion gap 7 5 - 15 mmol/L    Glucose 133 (H) 65 - 100 mg/dL    BUN 17 6 - 20 MG/DL    Creatinine 0.82 0.55 - 1.02 MG/DL    BUN/Creatinine ratio 21 (H) 12 - 20      eGFR >60 >60 ml/min/1.73m2    Calcium 9.1 8.5 - 10.1 MG/DL    Bilirubin, total 0.7 0.2 - 1.0 MG/DL    ALT (SGPT) 34 12 - 78 U/L    AST (SGOT) 33 15 - 37 U/L    Alk.  phosphatase 86 45 - 117 U/L    Protein, total 6.8 6.4 - 8.2 g/dL    Albumin 3.3 (L) 3.5 - 5.0 g/dL    Globulin 3.5 2.0 - 4.0 g/dL    A-G Ratio 0.9 (L) 1.1 - 2.2     TROPONIN-HIGH SENSITIVITY    Collection Time: 01/08/23 11:14 PM   Result Value Ref Range    Troponin-High Sensitivity 11 0 - 51 ng/L   MAGNESIUM    Collection Time: 01/08/23 11:14 PM   Result Value Ref Range    Magnesium 1.7 1.6 - 2.4 mg/dL   SAMPLES BEING HELD    Collection Time: 01/08/23 11:14 PM   Result Value Ref Range    SAMPLES BEING HELD 1RED 1BLUE     COMMENT        Add-on orders for these samples will be processed based on acceptable specimen integrity and analyte stability, which may vary by analyte. 01/09/23 0133  CTA HEAD NECK W CONT   Performed: 01/09/23 0104  Final        Impression:  1. No large vessel occlusion. 2. There is severe stenosis of the origin of the right internal carotid artery. Other atherosclerotic disease as described. Kiana Rg 01/09/23 0127  CT HEAD WO CONT   Performed: 01/09/23 0104  Final        Impression: Chronic small vessel ischemic disease. No acute intracranial abnormality. Assessment:  --Status Post Syncope with History of Recurrence; Likely this is secondary to significant obstructive right internal carotid disease, as recently placed left internal carotid stent is completely patent without any obstruction. Other differentials  like underlying dysrhythmias or subclinical seizures, be considered. --Bilateral obstructive carotid disease status post recent left carotid artery revascularization with stent placement. --History of stroke. --Hypertension. --History of dementia/Anxiety. --Dyslipidemia. --History of breast cancer. Plans:  --Patient being admitted to monitored closely on telemetry to rule out any underlying significant dysrhythmias contributing to patient's syncope while awaiting vascular surgery evaluation for significant right carotid obstructive disease. --Should have outpatient CardioNet monitoring and possibly an EEG for likely subclinical seizure. --Ordering 2D echocardiogram to rule out any significant LVOT obstruction. --Continue with patient's home dual antiplatelet including aspirin and Plavix along with high-dose Lipitor. --Resume patient's antihypertensive, check serial orthostasis and start gentle hydration with normal saline.  --DVT prophylaxis with Lovenox. --Consulting vascular surgery for further recommendation.

## 2023-01-09 NOTE — PROGRESS NOTES
[id number] Patient arrived from ED with transport. Pt alert and oriented, able to ambulate from stretcher to bed. Orthostatics done, pt denied any dizziness throughout. Call bell left within reach, crackers and ice water provided. Family also in room. 3224 Bedside shift change report given to Rj Doe by Brielle Ruvalcaba RN. Report included the following information SBAR, Kardex, MAR, Accordion, and Recent Results and Cardiac Rhythm NSR. Problem: Pressure Injury - Risk of  Goal: *Prevention of pressure injury  Description: Document Justin Scale and appropriate interventions in the flowsheet. Outcome: Progressing Towards Goal  Note: Pressure Injury Interventions:     Moisture Interventions: Absorbent underpads, Minimize layers    Activity Interventions: Pressure redistribution bed/mattress(bed type), Increase time out of bed    Mobility Interventions: Float heels, Turn and reposition approx. every two hours(pillow and wedges)    Nutrition Interventions: Document food/fluid/supplement intake    Problem: Falls - Risk of  Goal: *Absence of Falls  Description: Document Cintia Johnston Fall Risk and appropriate interventions in the flowsheet.   Outcome: Progressing Towards Goal  Note: Fall Risk Interventions:     Problem: Syncope  Goal: *Absence of injury  Outcome: Progressing Towards Goal     Problem: General Medical Care Plan  Goal: *Vital signs within specified parameters  Outcome: Progressing Towards Goal

## 2023-01-09 NOTE — ED PROVIDER NOTES
72-year-old female with history of CAD, HTN, dementia, stroke presents to the ED with chief complaint of syncopal episode. Patient was at home, felt nauseous, was found unresponsive on the floor by her daughter for several seconds. Patient says she currently feels fatigued but otherwise feels her normal self. Of note patient admitted a little over a week ago for several days for symptomatic left carotid stenosis and treated with TCAR. Denies any headache, numbness, weakness, chest pain, difficulty breathing, abdominal pain, urinary symptoms, bowel symptoms. 2:32 AM  Spoke with patient's daughter who witnessed the episode. She says that patient felt lightheaded, vomited, and collapsed on the ground. She slid down the side of her shower and did not hit her head. Work-up thus far negative. Will admit patient for syncope evaluation. The history is provided by the patient and the EMS personnel.       Past Medical History:   Diagnosis Date    Breast CA ([facility]     LEFT    CAD (coronary artery disease)     Cancer (HCC)     Dementia (HCC)     POSSIBLE-BEING EVALUATED    Diabetes ([facility] [address]     PT STATES ISN'T DIABETIC ANYMORE    Hypertension     Psychiatric disorder     Stroke [facility])        Past Surgical History:   Procedure Laterality Date    HX CATARACT REMOVAL Bilateral 2018    HX CHOLECYSTECTOMY      HX ORTHOPAEDIC      SHOULDER FRACTURE    HX PARTIAL HYSTERECTOMY  1975         Family History:   Problem Relation Age of Onset    Parkinson's Disease Mother     Cancer Father         GI    Cancer Sister         BREAST    Parkinson's Disease Sister     Parkinson's Disease Brother     Other Brother         HEALTH PROBS R/T WAR    Anesth Problems Neg Hx        Social History     Socioeconomic History    Marital status:      Spouse name: Not on file    Number of children: Not on file    Years of education: Not on file    Highest education level: Not on file   Occupational History    Not on file   Tobacco Use Smoking status: Former     Packs/day: 1.00     Years: 38.00     Pack years: 38.00     Types: Cigarettes     Quit date:      Years since quittin.0    Smokeless tobacco: Never   Vaping Use    Vaping Use: Never used   Substance and Sexual Activity    Alcohol use: Not Currently    Drug use: Never    Sexual activity: Not on file   Other Topics Concern    Not on file   Social History Narrative    Not on file     Social Determinants of Health     Financial Resource Strain: Not on file   Food Insecurity: Not on file   Transportation Needs: Not on file   Physical Activity: Not on file   Stress: Not on file   Social Connections: Not on file   Intimate Partner Violence: Not on file   Housing Stability: Not on file         ALLERGIES: Patient has no known allergies. Review of Systems   Constitutional:  Positive for fatigue. Negative for chills and fever. HENT:  Negative for congestion and rhinorrhea. Respiratory:  Negative for cough and shortness of breath. Cardiovascular:  Negative for chest pain and leg swelling. Gastrointestinal:  Negative for abdominal pain, constipation, diarrhea, nausea and vomiting. Genitourinary:  Negative for difficulty urinating, dysuria and hematuria. Musculoskeletal:  Negative for back pain and neck pain. Skin:  Negative for color change and rash. Neurological:  Positive for syncope. Negative for dizziness, weakness, light-headedness, numbness and headaches. Psychiatric/Behavioral:  Negative for agitation and confusion. There were no vitals filed for this visit. Physical Exam  Constitutional:       General: She is not in acute distress. Appearance: She is well-developed. HENT:      Head: Normocephalic and atraumatic. Eyes:      General: No scleral icterus. Pupils: Pupils are equal, round, and reactive to light. Neck:      Trachea: No tracheal deviation. Cardiovascular:      Rate and Rhythm: Normal rate and regular rhythm.       Heart sounds: No murmur heard. No friction rub. No gallop. Pulmonary:      Effort: Pulmonary effort is normal. No respiratory distress. Breath sounds: Normal breath sounds. No wheezing or rales. Abdominal:      General: Bowel sounds are normal. There is no distension. Palpations: Abdomen is soft. Tenderness: There is no abdominal tenderness. Musculoskeletal:         General: No deformity. Cervical back: Neck supple. Skin:     General: Skin is warm and dry. Neurological:      Mental Status: She is alert and oriented to person, place, and time. Cranial Nerves: No cranial nerve deficit. Sensory: No sensory deficit. Motor: No weakness. Psychiatric:         Behavior: Behavior normal.        Medical Decision Making  20-year-old female presenting to the ED with syncope. Vital signs are stable, exam is benign, no neurologic deficits. Recent admission for symptomatic left carotid stenosis, treated with TCAR. Will check basic labs, head CT, will consider repeat CTA to evaluate carotid arteries and will consider admission for syncope. Amount and/or Complexity of Data Reviewed  Independent Historian:      Details: daughter  External Data Reviewed: notes. Labs: ordered. Details: no acute abnormalities  Radiology: ordered and independent interpretation performed. Details: no bleed on non-contrast head CT  ECG/medicine tests: ordered and independent interpretation performed. Decision-making details documented in ED Course. Risk  Prescription drug management. Decision regarding hospitalization.       ED Course as of 01/09/23 0232   Mon Jan 09, 2023   0107 ED EKG interpretation:1:07 AM  Rhythm: normal sinus rhythm;  Rate (approx.): 60; Axis: normal; QRS interval: normal ; ST/T wave: normal; Other findings: normal.    [JW]      ED Course User Index  [JW] Damon Burnett MD       Procedures    2:32 AM    Admission Note:  Patient is being admitted to the hospital, Service: Hospitalist.  The results of their tests and reasons for their admission have been discussed with them and available family. They convey agreement and understanding for the need to be admitted and for their admission diagnosis. LABORATORY TESTS:  Labs Reviewed   CBC WITH AUTOMATED DIFF - Abnormal; Notable for the following components:       Result Value    NEUTROPHILS 83 (*)     MONOCYTES 3 (*)     IMMATURE GRANULOCYTES 1 (*)     All other components within normal limits   METABOLIC PANEL, COMPREHENSIVE - Abnormal; Notable for the following components:    Potassium 3.2 (*)     Chloride 111 (*)     Glucose 133 (*)     BUN/Creatinine ratio 21 (*)     Albumin 3.3 (*)     A-G Ratio 0.9 (*)     All other components within normal limits   TROPONIN-HIGH SENSITIVITY   MAGNESIUM   SAMPLES BEING HELD   URINALYSIS W/ RFLX MICROSCOPIC       IMAGING RESULTS:  CT HEAD WO CONT    Result Date: 1/9/2023  EXAM: CT HEAD WO CONT INDICATION: syncope COMPARISON: None. CONTRAST: None. TECHNIQUE: Unenhanced CT of the head was performed using 5 mm images. Brain and bone windows were generated. Coronal and sagittal reformats. CT dose reduction was achieved through use of a standardized protocol tailored for this examination and automatic exposure control for dose modulation. FINDINGS: The ventricles and sulci are normal in size, shape and configuration. . Scattered subcortical deep white matter hypodensities. Chronic lacunar infarct in the right thalamus. . There is no intracranial hemorrhage, extra-axial collection, or mass effect. The basilar cisterns are open. No CT evidence of acute infarct. The bone windows demonstrate no abnormalities. The visualized portions of the paranasal sinuses and mastoid air cells are clear. Chronic small vessel ischemic disease. No acute intracranial abnormality.      CTA HEAD NECK W CONT    Result Date: 1/9/2023  EXAM: CTA HEAD NECK W CONT INDICATION: evaluate for carotid stenosis or acute embolus COMPARISON: None. CONTRAST: 100 mL of Isovue 370. TECHNIQUE:  Unenhanced  images were obtained to localize the volume for acquisition. Multislice helical axial CT angiography was performed from the aortic arch to the top of the head during uneventful rapid bolus intravenous contrast administration. Coronal and sagittal reformations and 3D post processing was performed. CT dose reduction was achieved through use of a standardized protocol tailored for this examination and automatic exposure control for dose modulation. FINDINGS: CTA NECK There is conventional three vessel arch anatomy. Status post stent placement in the left internal carotid artery origin without significant stenosis. There is atherosclerotic disease with severe stenosis of the proximal right internal carotid artery. % of right carotid artery stenosis: 82% % of left carotid artery stenosis: No significant stenosis NASCET method was utilized for calculating stenosis. The vertebral arteries are codominant and patent. Hypodensity in the right thyroid gland. .  There are degenerative changes of the cervical spine. CTA HEAD The vertebral arteries are codominant. Diffuse atherosclerotic disease is noted in the V4 segment proximally. Vessels are otherwise patent. . The basilar artery and its branches are normal. Disease involving at least moderate stenosis in the cavernous internal carotid arteries. . There is no flow-limiting intracranial stenosis. There is no aneurysm. There are no sizable posterior communicating arteries. 1.  No large vessel occlusion. 2.  There is severe stenosis of the origin of the right internal carotid artery. Other atherosclerotic disease as described. Lainey Paez MEDICATIONS GIVEN:  Medications   potassium chloride SR (KLOR-CON 10) tablet 40 mEq (has no administration in time range)   iopamidoL (ISOVUE-370) 370 mg iodine /mL (76 %) injection 100 mL (100 mL IntraVENous Given 1/9/23 0106)       IMPRESSION:  1.  Syncope and collapse        PLAN:  - Admit to hospitalist    CONDITION:  stable    Hospitalist Perfect Serve for Admission  2:32 AM    ED Room Number: Room/bed info not found  Patient Name and age:  Smooth Arana 81 y.o.  female  Working Diagnosis:   1. Syncope and collapse        COVID-19 Suspicion:  no    Code Status:  Full Code  Readmission: no  Isolation Requirements:  no  Recommended Level of Care:  med/surg  Department:[facility] Adult ED - 21   Other: Witnessed syncope at home by daughter, did not hit her head, had associated episode of vomiting. Unconscious for several seconds. Was recently admitted for syncope and had left TCAR, CTA today shows severe right carotid stenosis.     Signed By: Ingrid Fernadno MD     January 9, 2023

## 2023-01-10 ENCOUNTER — DOCUMENTATION ONLY (OUTPATIENT)
Dept: CARDIOLOGY CLINIC | Age: 83
End: 2023-01-10

## 2023-01-10 VITALS
RESPIRATION RATE: 16 BRPM | DIASTOLIC BLOOD PRESSURE: 55 MMHG | TEMPERATURE: 97.3 F | BODY MASS INDEX: 29.56 KG/M2 | SYSTOLIC BLOOD PRESSURE: 148 MMHG | HEART RATE: 60 BPM | HEIGHT: 60 IN | WEIGHT: 150.57 LBS | OXYGEN SATURATION: 99 %

## 2023-01-10 LAB
ANION GAP SERPL CALC-SCNC: 5 MMOL/L (ref 5–15)
APAP SERPL-MCNC: 5 UG/ML (ref 10–30)
ATRIAL RATE: 63 BPM
BUN SERPL-MCNC: 11 MG/DL (ref 6–20)
BUN/CREAT SERPL: 17 (ref 12–20)
CALCIUM SERPL-MCNC: 8.4 MG/DL (ref 8.5–10.1)
CALCULATED P AXIS, ECG09: 61 DEGREES
CALCULATED R AXIS, ECG10: 27 DEGREES
CALCULATED T AXIS, ECG11: 47 DEGREES
CHLORIDE SERPL-SCNC: 116 MMOL/L (ref 97–108)
CO2 SERPL-SCNC: 21 MMOL/L (ref 21–32)
CREAT SERPL-MCNC: 0.65 MG/DL (ref 0.55–1.02)
DIAGNOSIS, 93000: NORMAL
ERYTHROCYTE [DISTWIDTH] IN BLOOD BY AUTOMATED COUNT: 13 % (ref 11.5–14.5)
FERRITIN SERPL-MCNC: 210 NG/ML (ref 26–388)
FOLATE SERPL-MCNC: 10 NG/ML (ref 5–21)
GLUCOSE SERPL-MCNC: 74 MG/DL (ref 65–100)
HCT VFR BLD AUTO: 28.4 % (ref 35–47)
HGB BLD-MCNC: 9.6 G/DL (ref 11.5–16)
IRON SATN MFR SERPL: 22 % (ref 20–50)
IRON SERPL-MCNC: 43 UG/DL (ref 35–150)
MAGNESIUM SERPL-MCNC: 1.8 MG/DL (ref 1.6–2.4)
MCH RBC QN AUTO: 32 PG (ref 26–34)
MCHC RBC AUTO-ENTMCNC: 33.8 G/DL (ref 30–36.5)
MCV RBC AUTO: 94.7 FL (ref 80–99)
NRBC # BLD: 0 K/UL (ref 0–0.01)
NRBC BLD-RTO: 0 PER 100 WBC
P-R INTERVAL, ECG05: 184 MS
PLATELET # BLD AUTO: 176 K/UL (ref 150–400)
PMV BLD AUTO: 10.8 FL (ref 8.9–12.9)
POTASSIUM SERPL-SCNC: 3.6 MMOL/L (ref 3.5–5.1)
Q-T INTERVAL, ECG07: 448 MS
QRS DURATION, ECG06: 114 MS
QTC CALCULATION (BEZET), ECG08: 458 MS
RBC # BLD AUTO: 3 M/UL (ref 3.8–5.2)
SALICYLATES SERPL-MCNC: <1.7 MG/DL (ref 2.8–20)
SODIUM SERPL-SCNC: 142 MMOL/L (ref 136–145)
TIBC SERPL-MCNC: 195 UG/DL (ref 250–450)
TSH SERPL DL<=0.05 MIU/L-ACNC: 2.56 UIU/ML (ref 0.36–3.74)
VENTRICULAR RATE, ECG03: 63 BPM
VIT B12 SERPL-MCNC: 642 PG/ML (ref 193–986)
WBC # BLD AUTO: 4.2 K/UL (ref 3.6–11)

## 2023-01-10 PROCEDURE — 97161 PT EVAL LOW COMPLEX 20 MIN: CPT

## 2023-01-10 PROCEDURE — 82746 ASSAY OF FOLIC ACID SERUM: CPT

## 2023-01-10 PROCEDURE — 80143 DRUG ASSAY ACETAMINOPHEN: CPT

## 2023-01-10 PROCEDURE — 83735 ASSAY OF MAGNESIUM: CPT

## 2023-01-10 PROCEDURE — 74011250637 HC RX REV CODE- 250/637: Performed by: INTERNAL MEDICINE

## 2023-01-10 PROCEDURE — 36415 COLL VENOUS BLD VENIPUNCTURE: CPT

## 2023-01-10 PROCEDURE — 97165 OT EVAL LOW COMPLEX 30 MIN: CPT

## 2023-01-10 PROCEDURE — 85027 COMPLETE CBC AUTOMATED: CPT

## 2023-01-10 PROCEDURE — 80048 BASIC METABOLIC PNL TOTAL CA: CPT

## 2023-01-10 PROCEDURE — 84443 ASSAY THYROID STIM HORMONE: CPT

## 2023-01-10 PROCEDURE — 83540 ASSAY OF IRON: CPT

## 2023-01-10 PROCEDURE — 99222 1ST HOSP IP/OBS MODERATE 55: CPT | Performed by: INTERNAL MEDICINE

## 2023-01-10 PROCEDURE — 74011250636 HC RX REV CODE- 250/636: Performed by: INTERNAL MEDICINE

## 2023-01-10 PROCEDURE — G0378 HOSPITAL OBSERVATION PER HR: HCPCS

## 2023-01-10 PROCEDURE — 82607 VITAMIN B-12: CPT

## 2023-01-10 PROCEDURE — 82728 ASSAY OF FERRITIN: CPT

## 2023-01-10 PROCEDURE — 97535 SELF CARE MNGMENT TRAINING: CPT

## 2023-01-10 PROCEDURE — 80179 DRUG ASSAY SALICYLATE: CPT

## 2023-01-10 PROCEDURE — 96372 THER/PROPH/DIAG INJ SC/IM: CPT

## 2023-01-10 PROCEDURE — 93005 ELECTROCARDIOGRAM TRACING: CPT

## 2023-01-10 RX ORDER — DONEPEZIL HYDROCHLORIDE 5 MG/1
5 TABLET, FILM COATED ORAL
Qty: 30 TABLET | Refills: 0 | Status: SHIPPED | OUTPATIENT
Start: 2023-01-10 | End: 2023-02-08

## 2023-01-10 RX ADMIN — LISINOPRIL 20 MG: 20 TABLET ORAL at 10:04

## 2023-01-10 RX ADMIN — AMLODIPINE BESYLATE 5 MG: 5 TABLET ORAL at 06:31

## 2023-01-10 RX ADMIN — ENOXAPARIN SODIUM 40 MG: 100 INJECTION SUBCUTANEOUS at 10:04

## 2023-01-10 RX ADMIN — CLOPIDOGREL BISULFATE 75 MG: 75 TABLET ORAL at 10:04

## 2023-01-10 NOTE — DISCHARGE SUMMARY
Discharge Summary       PATIENT ID: Cosmo Aldana  MRN: [id number]   YOB: 1940    DATE OF ADMISSION: 1/9/2023  2:36 AM    DATE OF DISCHARGE: 1/10/2023     PRIMARY CARE PROVIDER: [address] [place] [address]: [facility]. ATTENDING PHYSICIAN: Lou Wagoner MD  DISCHARGING PROVIDER: Luciano Dunbar NP    To contact this individual call [phone] and ask the  to page. If unavailable ask to be transferred the Adult Hospitalist Department. CONSULTATIONS: IP CONSULT TO VASCULAR SURGERY  IP CONSULT TO NEUROLOGY  IP CONSULT TO CARDIOLOGY    PROCEDURES/SURGERIES: * No surgery found *    DISCHARGE DIAGNOSES:     Syncopal Episode, Bilateral Obstructive Carotid Disease, HTN, HLD, Hx CVA, Hx Breast Cancer    ADMISSION SUMMARY AND HOSPITAL COURSE:   Patient is an 81year old  female with a PMH of CAD, HTN, Hx of CVA, Bilateral Obstructive Carotid Disease with multiple Syncopal episodes, s/p recent Left TCAR December 2022, on dual antiplatelet therapy and high intensity Lipitor who presented to the ED via EMS for eval after she became dizzy after a shower, causing her use the wall for support, she moved to the floor along the wall without hitting her head and became unconscious for few seconds without responding to the daughter who witnessed the event. Daughter reported x1 episode of vomiting while she was unresponsive without any noted tongue biting or urinary incontinence and confusion afterwards. Patient's daughter reported recent onset of patient having episodes of staring with unresponsiveness. Patient ha returned to baseline AAOx3 upon presentation to ED. Work up in ED revealed abnormal CTA head/neck as outlined below. Hospitalist service consulted for admit, further work up and treatment. DISCHARGE DIAGNOSES / PLAN:      Syncopal Episode:   - tele  - ECHO 1/9: Normal LVSF EF 60 - 65%. Left ventricle size is normal. Normal wall thickness. Normal wall motion.  Diastolic dysfunction present with normal LV EF.  - UA 1/9: trace protein, ketone trace, leukocyte esterase trace, Ca Oxalate crystals 1+  - supplemental O2 prn  - Neuro checks  - Neuro eval  - PT/OT  - B12, folate, TSH, iron panel, ferritin  - follow up Cardiology out patient: cardiac monitor  - follow up with Dr. Christie Blevins, neurology as scheduled     Bilateral Obstructive Carotid Disease: 12/30 s/p LCA revascularization with stent placement. - CTA H/N 1/9/23: Neck: Status post stent placement left internal carotid artery origin without significant stenosis. There is atherosclerotic disease with severe stenosis of the proximal right internal carotid artery, right carotid artery stenosis: 82%, left carotid artery stenosis: No significant stenosis. Head: Disease involving at least moderate stenosis in the cavernous internal carotid arteries. There is no flow-limiting intracranial stenosis. There is no aneurysm. There are no sizable posterior communicating arteries. - Vascular Surgery: \"plan remains to perform R sided TCAR around the 8 week post-op crissy as earlier places her at increased risk for cerebral hyperperfusion\"        HTN: continue home regimen  HLD: continue dual platelet therapy, ASA/Plavix and high dose lipitor  Hx CVA: 10/2022. Hx Breast Ca: s/p radiation and lumpectomy 2008. BMI: Body mass index is 29.41 kg/m². . This patient: Meets criteria for overweight given BMI >/= 25 and < 30 due to excess calories/nutritional. Weight loss and lifestyle modifications should be encouraged as an outpatient. PENDING TEST RESULTS:   At the time of discharge the following test results are still pending: none. ADDITIONAL CARE RECOMMENDATIONS:     You have been deemed stable for discharge and are being discharged home with family. You are taking a blood thinner, should you develop any abnormal or prolonged bleeding- seek care at nearest ER.     Should you need medication refills beyond what we have prescribed for you, you will need to contact your primary care provider for refills. Return to the ER or notify your primary care office if you have a fever greater than 101.5 associated with chills, chest pain, or signs and symptoms of a stroke which are:  B E F A S T  -loss of balance, headaches or dizziness  -eyes blurred vision (sudden)  -asymmetrical facial symmetry (side of face droops)  -arms and leg weakness  -slurred speech / difficulty speaking  -if you experience any of these (time to call for an ambulance)       NOTIFY YOUR PHYSICIAN FOR ANY OF THE FOLLOWING:   Fever over 101 degrees for 24 hours. Chest pain, shortness of breath, fever, chills, nausea, vomiting, diarrhea, change in mentation, falling, weakness, bleeding. Severe pain or pain not relieved by medications, as well as any other signs or symptoms that you may have questions about. FOLLOW UP APPOINTMENTS:    Follow-up Information       Follow up With Specialties Details Why Contact Info    Milagros Gomez MD Cardiovascular Disease Physician, Clinical Cardiac Electrophysiology Physician Schedule an appointment as soon as possible for a visit in 1 week(s)  [place] [address]  [address] [facility] [phone]      Cassie Hurt MD Neurology Follow up Keep scheduled appoinment [address] [phone]      Merrick Borden MD Vascular Surgery, General Surgery Follow up keep scheduled follow up [address] [place] [address] Drive  [phone]      [facility] Follow up [address] [place] [address]  [phone]               DIET: Cardiac Diet    ACTIVITY: Activity as tolerated    EQUIPMENT needed: patient's own DME. DISCHARGE MEDICATIONS:  Current Discharge Medication List        START taking these medications    Details   donepeziL (ARICEPT) 5 mg tablet Take 1 Tablet by mouth nightly for 29 doses.   Qty: 30 Tablet, Refills: 0  Start date: 1/10/2023, End date: 2/8/2023    Comments: Increase dose to 10mg in 30 days. #30, no refill. CONTINUE these medications which have NOT CHANGED    Details   EC ASPIRIN PO Take 81 mg by mouth. tetrahydrozoline/polyethyl gly (EYE DROPS OP) Apply  to eye as needed. lisinopriL (PRINIVIL, ZESTRIL) 20 mg tablet Take  by mouth Every morning. amLODIPine (NORVASC) 5 mg tablet Take 5 mg by mouth Every morning. clopidogreL (PLAVIX) 75 mg tab Take  by mouth Every morning. atorvastatin (LIPITOR) 80 mg tablet Take 80 mg by mouth nightly. DISPOSITION:  X  Home With: family   OT  PT  HH  RN       Long term SNF/Inpatient Rehab    Independent/assisted living    Hospice    Other:       PATIENT CONDITION AT DISCHARGE:     Functional status    Poor     Deconditioned    X Independent      Cognition   X  Lucid     Forgetful     Dementia      Catheters/lines (plus indication)    Cha     PICC     PEG    X None      Code status    X Full code     DNR      PHYSICAL EXAMINATION AT DISCHARGE:    Constitutional:  No acute distress, cooperative, pleasant    ENT:  Oral mucosa moist.    Resp:  CTA bilaterally. No wheezing/rhonchi/rales. No accessory muscle use. CV:  Regular rhythm, normal rate, S1,S2.    GI/:  Soft, non distended, non tender, no guarding, BS present. Voids Freely. Musculoskeletal:  No edema, warm. Neurologic:  Moves all extremities. AAOx3. Skin:  w/d. Psych:  Not anxious nor agitated.          CHRONIC MEDICAL DIAGNOSES:  Problem List as of 1/10/2023 Date Reviewed: 12/6/2022            Codes Class Noted - Resolved    Syncope and collapse ICD-10-CM: R55  ICD-9-CM: 780.2  1/9/2023 - Present        Symptomatic stenosis of left carotid artery ICD-10-CM: I65.22  ICD-9-CM: 433.10  12/30/2022 - Present           Greater than 45 minutes were spent with the patient on counseling and coordination of care    Signed:   Cecil Parekh NP  1/10/2023  3:30 PM

## 2023-01-10 NOTE — PROGRESS NOTES
OCCUPATIONAL THERAPY EVALUATION/DISCHARGE  Patient: Aguila Riddle (62 y.o. female)  Date: 1/10/2023  Primary Diagnosis: Syncope and collapse [R55]       Precautions: Fall       ASSESSMENT  Based on the objective data described below, the patient presents close to or at baseline with self-care and mobility. Supportive daughter present in the room. Today, no syncopal event noted with self-care tasks or ambulation (no AD needed). After activity, /70. Per daughter, pt has a tendency to have increase BP after activity and it goes back down after 5 min. Nursing notified and planned on re-checking. Daughter reported that her mother still does her own laundry, but has started to recognize some challenges with dressing (ie putting clothes on backwards). Feel pt would benefit from [facility] to address current deficits in pt's own environment and how to keep pt carrying out simple ADL tasks as pt's dementia progresses. Pt has 24/7 care at home. No further skilled acute OT needed at this time. Current Level of Function (ADLs/self-care): supervision to independent level    Other factors to consider for discharge: memory loss     PLAN :  Recommend with staff:     Recommendation for discharge: (in order for the patient to meet his/her long term goals)  Occupational therapy at least 2 days/week in the home     This discharge recommendation:  A follow-up discussion with the attending provider and/or case management is planned    IF patient discharges home will need the following DME: none       SUBJECTIVE:   Patient stated I like watching sports.     OBJECTIVE DATA SUMMARY:   HISTORY:   Past Medical History:   Diagnosis Date    Breast CA ([facility] [address]     LEFT    CAD (coronary artery disease)     Cancer ([facility] [address]     [facility]     POSSIBLE-BEING EVALUATED    Diabetes ([facility]     PT STATES ISN'T DIABETIC ANYMORE    Hypertension     Psychiatric disorder     Stroke [facility])      Past Surgical History:   Procedure Laterality Date HX CATARACT REMOVAL Bilateral 2018    HX CHOLECYSTECTOMY      HX ORTHOPAEDIC      SHOULDER FRACTURE    HX PARTIAL HYSTERECTOMY  1975       Prior Level of Function/Environment/Context: independent  Expanded or extensive additional review of patient history:   Home Situation  Home Environment: Private residence  One/Two Story Residence: One story  Living Alone: No  Support Systems: Child(debbie)  Patient Expects to be Discharged to[de-identified] Home  Current DME Used/Available at Home: None        EXAMINATION OF PERFORMANCE DEFICITS:  Cognitive/Behavioral Status:  Neurologic State: Alert  Orientation Level: Oriented to person;Oriented to place  Cognition: Memory loss        Safety/Judgement: Decreased insight into deficits    Skin: intact    Edema: none noted    Hearing: Auditory  Auditory Impairment: None    Vision/Perceptual:                                     Range of Motion:    AROM: Within functional limits                         Strength:    Strength: Generally decreased, functional                Coordination:  Coordination: Generally decreased, functional  Fine Motor Skills-Upper: Left Intact; Right Intact    Gross Motor Skills-Upper: Left Intact; Right Intact    Tone & Sensation:                              Balance:  Sitting: Intact  Standing: Intact; With support    Functional Mobility and Transfers for ADLs:  Bed Mobility:  Rolling: Supervision  Supine to Sit:  (S from family)  Sit to Supine: Supervision  Scooting: Supervision    Transfers:  Sit to Stand: Supervision  Stand to Sit: Supervision  Bed to Chair: Supervision  Bathroom Mobility: Supervision/set up  Toilet Transfer : Supervision    ADL Assessment:  Feeding: Independent    Oral Facial Hygiene/Grooming: Setup         Type of Bath: Chlorhexidine (CHG)    Upper Body Dressing: Setup    Lower Body Dressing: Setup    Toileting: Supervision                ADL Intervention and task modifications:                 Type of Bath: Chlorhexidine (CHG)           Cognitive Retraining  Safety/Judgement: Decreased insight into deficits      Pain Rating:  No c/o of pain    Activity Tolerance:   Good    After treatment patient left in no apparent distress:    Sitting in chair and Call bell within reach    COMMUNICATION/EDUCATION:   The patients plan of care was discussed with: Physical therapist and Nursing.      Thank you for this referral.  Cristian Rodriguez OTR/L  Time Calculation: 24 mins

## 2023-01-10 NOTE — CONSULTS
[facility]                    Cardiology Care Note     [x]Initial Encounter     []Follow-up    Patient Name: Segundo Cade - MGI:[id number]  Primary Cardiologist: none  Consulting Cardiologist: [facility] Cardiology Physicians: Manuel Kline MD     Reason for encounter: dizziness, syncope, hypertension    HPI:       Smooth Arana is a 81 y.o. female with PMH significant for hypertension, CVA, and CAD. She recently moved from [place]. While in [facility], she had a syncopal episode shortly after showering and evaluation showed bilateral carotid disease and CVA. On December 29th she had a dizzy where she was staring off and when asked what was going on by her daughter she said she was dizzy. They got her a chair and she sat down and began to go limp. Her daughter checked her blood pressure shortly thereafter and SBP was 120s. She was taken to [facility] and underwent left TCAR 12/30/22. She was discharged on 12/31/22. She had another episode of dizziness yesterday. She had just gotten out of the shower and was walking into her room. Her daughter noticed her standing there and staring off. She was leaning on the wall and her daughter helped lower her to the floor. Once she was in bed her daughter checked her BP and it was in the 120s. She vomited while staring off but her daughter doesn't believe she lost consciousness. She doesn't remember this episode. Echo 1/9/22 showed EF 60-65% without any significant structural disease. No arrhythmias noted on tele. Subjective:      Smooth Arana denies any symptoms at this time, specifically chest pain, dyspnea or dizziness. Assessment and Plan     Recurrent episodes of dizziness/syncope. - Echo 1/9/23: EF 60-65% without any significant valvular disease.   - No arrhythmias noted on tele.    - We will set up a 30 day monitor to further assess for any arrhythmias. Our office will reach out to her to facilitate this. Carotid disease.  - Felt to be cause of recent CVA in [facility] [place].   - S/p left TCAR 12/20/22.  - Plan for right TCAR in 8 weeks. - Vascular surgery following.   - Continue aspirin, Plavix and statin. Hypertension.   - She seems to have labile BP.  - Continue lisinopril and amlodipine. Saw and evaluated pt and agree with above assessment and plan. No arrhythmia on telemetry. Echo was normal. Compensated on exam. No obvious cardiac contribution. Will set up outpt loop ordered from our office outpt. Will sign off. Chaz Lozoya MD         ____________________________________________________________    Cardiac testing  01/09/23    ECHO ADULT COMPLETE 01/09/2023 1/9/2023    Interpretation Summary    Left Ventricle: Normal left ventricular systolic function with a visually estimated EF of 60 - 65%. Left ventricle size is normal. Normal wall thickness. Normal wall motion. Diastolic dysfunction present with normal LV EF. Aortic Valve: Tricuspid valve. Technical qualifiers: Echo study was technically difficult with poor endocardial visualization. Contrast used: Definity.     Signed by: Mindy Da Silva MD on 1/9/2023  4:42 PM      Most recent HS troponins:  Recent Labs     01/08/23  2314   TROPHS 11       ECG 12/30/22: sinus rhythm with incomplete RBBB    Review of Systems:    [x]All other systems reviewed and all negative except as written in HPI    [] Patient unable to provide secondary to condition    Past Medical History:   Diagnosis Date    Breast CA ([address]     LEFT    CAD (coronary artery disease)     Cancer ([address]     Dementia ([address]     POSSIBLE-BEING EVALUATED    Diabetes [address]     PT STATES ISN'T DIABETIC ANYMORE    Hypertension     Psychiatric disorder     Stroke [facility])      Past Surgical History:   Procedure Laterality Date    HX CATARACT REMOVAL Bilateral 2018    HX CHOLECYSTECTOMY      HX ORTHOPAEDIC      SHOULDER FRACTURE    HX PARTIAL HYSTERECTOMY  1975     Social Hx:  reports that she quit smoking about 25 years ago. Her smoking use included cigarettes. She has a 38.00 pack-year smoking history. She has never used smokeless tobacco. She reports that she does not currently use alcohol. She reports that she does not use drugs. Family Hx: family history includes Cancer in her father and sister; Other in her brother; Parkinson's Disease in her brother, mother, and sister. No Known Allergies       OBJECTIVE:  Wt Readings from Last 3 Encounters:   01/10/23 68.3 kg (150 lb 9.2 oz)   12/30/22 69.9 kg (154 lb)   12/21/22 68 kg (149 lb 14.6 oz)     No intake or output data in the 24 hours ending 01/10/23 1207    Physical Exam:    Vitals:   Vitals:    01/10/23 0545 01/10/23 0630 01/10/23 0834 01/10/23 1000   BP:  (!) 150/76 (!) 162/68    Pulse: 60 91 62 65   Resp:   17    Temp:   98 °F (36.7 °C)    SpO2:   97%    Weight: 68.3 kg (150 lb 9.2 oz)      Height: 5' (1.524 m)        Telemetry: sinus rhythm    Gen: Well-developed, well-nourished, in no acute distress  Neck: Supple, No JVD, No Carotid Bruit  Resp: No accessory muscle use, Clear breath sounds, No rales or rhonchi  Card: Regular Rate,Rythm, Normal S1, S2, No murmurs, rubs or gallop. No thrills. Abd:   Soft, non-tender, non-distended, BS+   MSK: No cyanosis  Skin: No rashes    Neuro: Moving all four extremities, follows commands appropriately  Psych: Good insight, oriented to person, place, alert, Nml Affect  LE: No edema    Data Review:     Radiology:   XR Results (most recent):      Recent Labs     01/10/23  0419 01/08/23  2314    141   K 3.6 3.2*   * 111*   CO2 21 23   BUN 11 17   CREA 0.65 0.82   GLU 74 133*   CA 8.4* 9.1     Recent Labs     01/10/23  0419 01/08/23  2314   WBC 4.2 8.6   HGB 9.6* 12.2   HCT 28.4* 36.2    229     Recent Labs     01/08/23  2314   AP 86     No results for input(s): CHOL, LDLC in the last 72 hours.     No lab exists for component: TGL, HDLC,  HBA1C      Current meds:    Current Facility-Administered Medications:     sodium chloride (NS) flush 5-40 mL, 5-40 mL, IntraVENous, Q8H, Behzad Barnes MD, 10 mL at 01/09/23 1400    sodium chloride (NS) flush 5-40 mL, 5-40 mL, IntraVENous, PRN, Ara Rodriguez MD    acetaminophen (TYLENOL) tablet 650 mg, 650 mg, Oral, Q6H PRN **OR** acetaminophen (TYLENOL) suppository 650 mg, 650 mg, Rectal, Q6H PRN, Ara Rodriguez MD    polyethylene glycol (MIRALAX) packet 17 g, 17 g, Oral, DAILY PRN, Ara Rodriguez MD    ondansetron (ZOFRAN ODT) tablet 4 mg, 4 mg, Oral, Q8H PRN **OR** ondansetron (ZOFRAN) injection 4 mg, 4 mg, IntraVENous, Q6H PRN, Behzad Martins MD    enoxaparin (LOVENOX) injection 40 mg, 40 mg, SubCUTAneous, DAILY, Behzad Barnes MD, 40 mg at 01/10/23 1004    amLODIPine (NORVASC) tablet 5 mg, 5 mg, Oral, QAM, Ara Rodriguez MD, 5 mg at 01/10/23 0631    clopidogreL (PLAVIX) tablet 75 mg, 75 mg, Oral, DAILY, Behzad Barnes MD, 75 mg at 01/10/23 1004    lisinopriL (PRINIVIL, ZESTRIL) tablet 20 mg, 20 mg, Oral, DAILY, Behzad Barnes MD, 20 mg at 01/10/23 1004    polyvinyl alcohol-povidone (NATURAL TEARS) 0.5-0.6 % ophthalmic solution 1 Drop, 1 Drop, Both Eyes, DAILY PRN, Ara Rodriguez MD    hydrALAZINE (APRESOLINE) 20 mg/mL injection 10 mg, 10 mg, IntraVENous, Q6H PRN, Behzad Martins MD    atorvastatin (LIPITOR) tablet 80 mg, 80 mg, Oral, QHS, Behzad Barnes MD, 80 mg at 01/09/23 2211    donepeziL (ARICEPT) tablet 5 mg, 5 mg, Oral, QHS, Sujatha Grimm MD, 5 mg at 01/09/23 2211    [START ON 2/8/2023] donepeziL (ARICEPT) tablet 10 mg, 10 mg, Oral, QHS, MD Carlos Brown, NP    [facility] Cardiology  Call center: (S) [phone]  (A) [phone]      CC: Unknown, Unknown

## 2023-01-10 NOTE — DISCHARGE INSTRUCTIONS
Discharge Instructions       PATIENT ID: Cosmo Aldana  MRN: [id number]   YOB: 1940    DATE OF ADMISSION: 1/9/2023  DATE OF DISCHARGE: 1/10/2023      ATTENDING PHYSICIAN: Lou Wagoner MD  DISCHARGING PROVIDER: Luciano Dunbar NP    To contact this individual call [phone] and ask the  to page. If unavailable ask to be transferred the Adult Hospitalist Department. DISCHARGE DIAGNOSES: Syncopal Episode, Bilateral Obstructive Carotid Disease, HTN, HLD, Hx CVA, Hx Breast Cancer    CONSULTATIONS: IP CONSULT TO VASCULAR SURGERY  IP CONSULT TO NEUROLOGY  IP CONSULT TO CARDIOLOGY    PROCEDURES/SURGERIES: * No surgery found *    PENDING TEST RESULTS:   At the time of discharge the following test results are still pending: none. FOLLOW UP APPOINTMENTS:     Yaya Burch MD: NEUROLOGY    Debra oMrel MD: CARDIOLOGY    Dada Choi MD VASCULAR SURGERY    ADDITIONAL CARE RECOMMENDATIONS:   You have been deemed stable for discharge and are being discharged home with family. You are taking a blood thinner, should you develop any abnormal or prolonged bleeding- seek care at nearest ER. Should you need medication refills beyond what we have prescribed for you, you will need to contact your primary care provider for refills. Return to the ER or notify your primary care office if you have a fever greater than 101.5 associated with chills, chest pain, or signs and symptoms of a stroke which are:  B E F A S T  -loss of balance, headaches or dizziness  -eyes blurred vision (sudden)  -asymmetrical facial symmetry (side of face droops)  -arms and leg weakness  -slurred speech / difficulty speaking  -if you experience any of these (time to call for an ambulance)      DIET: Cardiac Diet    ACTIVITY: Activity as tolerated    EQUIPMENT needed: patient's own DME.           DISCHARGE MEDICATIONS:   See Medication Reconciliation Form    It is important that you take the medication exactly as they are prescribed. Keep your medication in the bottles provided by the pharmacist and keep a list of the medication names, dosages, and times to be taken in your wallet. Do not take other medications without consulting your doctor. NOTIFY YOUR PHYSICIAN FOR ANY OF THE FOLLOWING:   Fever over 101 degrees for 24 hours. Chest pain, shortness of breath, fever, chills, nausea, vomiting, diarrhea, change in mentation, falling, weakness, bleeding. Severe pain or pain not relieved by medications. Or, any other signs or symptoms that you may have questions about. DISPOSITION:  X  Home With: family   OT  PT  HH  RN       SNF/Inpatient Rehab/LTAC    Independent/assisted living    Hospice    Other:     CDMP Checked: Yes X     PROBLEM LIST Updated:   Yes X       Signed:   Lavonne Mon NP  1/10/2023  3:35 PM

## 2023-01-10 NOTE — PROGRESS NOTES
Transition of Care Plan  RUR- Observation   DISPOSITION: The disposition plan is home with family assistance and [facility]  HH: Accepted Added to the pt's AVS   [facility] Phone: [phone]  F/U with PCP/Specialist    Transport: Family          CM: [address] MSW,   [phone]

## 2023-01-10 NOTE — PROGRESS NOTES
Problem: Mobility Impaired (Adult and Pediatric)  Goal: *Acute Goals and Plan of Care (Insert Text)  Description: FUNCTIONAL STATUS PRIOR TO ADMISSION: Patient was independent and active without use of DME.    HOME SUPPORT PRIOR TO ADMISSION: The patient lived alone with no local support. Physical Therapy Goals  Initiated 1/10/2023  1. Patient will move from supine to sit and sit to supine , scoot up and down, and roll side to side in bed with independence within 7 day(s). 2.  Patient will transfer from bed to chair and chair to bed with independence using the least restrictive device within 7 day(s). 3.  Patient will perform sit to stand with independence within 7 day(s). 4.  Patient will ambulate with supervision for 300 feet with the least restrictive device within 7 day(s). 5.  Patient will ascend/descend 4 stairs with 1 handrail(s) with supervision/set-up within 7 day(s). 1/10/2023 1122 by Kt Akhtar PT  Outcome: Progressing Towards Goal    PHYSICAL THERAPY EVALUATION  Patient: Jesse Cheatham (34 y.o. female)  Date: 1/10/2023  Primary Diagnosis: Syncope and collapse [R55]       Precautions: falls         ASSESSMENT  Based on the objective data described below, the patient presents with syncope and collapse while in bathroom with daughter. Pt moved to live with daughter recently and is in process for neurological/cognitive workup. Pt received in bathroom with daughter and able to perform mobility with supervision at this time in small spaces. Pt CGA for gait training 15 ft to bed, as tech present to perform EKG. Pt ambulated 300ft with CGA and no AD with OT after and appears to be close to baseline. Recommend HHPT at this time    Current Level of Function Impacting Discharge (mobility/balance): controlled    Functional Outcome Measure: The patient scored Total: 65/100 on the Barthel Index outcome measure which is indicative of being moderate in basic self-care.      Other factors to consider for discharge:      Patient will benefit from skilled therapy intervention to address the above noted impairments. PLAN :  Recommendations and Planned Interventions: bed mobility training, transfer training, gait training, therapeutic exercises, neuromuscular re-education, patient and family training/education, and therapeutic activities      Frequency/Duration: Patient will be followed by physical therapy:  5 times a week to address goals. Recommendation for discharge: (in order for the patient to meet his/her long term goals)  Physical therapy at least 2 days/week in the home     This discharge recommendation:  Has been made in collaboration with the attending provider and/or case management    IF patient discharges home will need the following DME: rolling walker         SUBJECTIVE:   Patient stated Deborah Valdes     OBJECTIVE DATA SUMMARY:   HISTORY:    Past Medical History:   Diagnosis Date    Breast CA ([facility] [address]     LEFT    CAD (coronary artery disease)     Cancer ([facility] [address]     Dementia ([facility] [address]     POSSIBLE-BEING EVALUATED    Diabetes ([facility] [address]     PT STATES ISN'T DIABETIC ANYMORE    Hypertension     Psychiatric disorder     Stroke [facility])      Past Surgical History:   Procedure Laterality Date    HX CATARACT REMOVAL Bilateral 2018    HX CHOLECYSTECTOMY      HX ORTHOPAEDIC      SHOULDER FRACTURE    HX PARTIAL HYSTERECTOMY  1975       Personal factors and/or comorbidities impacting plan of care:     Home Situation  Home Environment: Private residence  One/Two Story Residence: One story  Living Alone: No  Support Systems: Child(debbie)  Patient Expects to be Discharged to[de-identified] Home  Current DME Used/Available at Home: None    EXAMINATION/PRESENTATION/DECISION MAKING:   Critical Behavior:              Hearing:   Auditory  Auditory Impairment: None  Skin:  intact  Edema: none  Range Of Motion:  AROM: Within functional limits                       Strength:    Strength: Generally decreased, functional                    Tone & Sensation:                                  Coordination:  Coordination: Generally decreased, functional  Vision:      Functional Mobility:  Bed Mobility:  Rolling: Supervision  Supine to Sit:  (S from family)  Sit to Supine: Supervision  Scooting: Supervision  Transfers:  Sit to Stand: Supervision  Stand to Sit: Supervision  Stand Pivot Transfers: Supervision                    Balance:   Sitting: Intact  Standing: Intact; With support  Ambulation/Gait Training:  Distance (ft): 15 Feet (ft) (walked 300ft with CGA and OT later)  Assistive Device: Gait belt  Ambulation - Level of Assistance: Contact guard assistance     Gait Description (WDL): Exceptions to WDL  Gait Abnormalities: Shuffling gait; Decreased step clearance        Base of Support: Widened     Speed/Myrna: Shuffled; Slow  Step Length: Right shortened;Left shortened            Functional Measure:  Barthel Index:    Bathin  Bladder: 10  Bowels: 10  Groomin  Dressin  Feeding: 10  Mobility: 15  Stairs: 0  Toilet Use: 5  Transfer (Bed to Chair and Back): 10  Total: 65/100       The Barthel ADL Index: Guidelines  1. The index should be used as a record of what a patient does, not as a record of what a patient could do. 2. The main aim is to establish degree of independence from any help, physical or verbal, however minor and for whatever reason. 3. The need for supervision renders the patient not independent. 4. A patient's performance should be established using the best available evidence. Asking the patient, friends/relatives and nurses are the usual sources, but direct observation and common sense are also important. However direct testing is not needed. 5. Usually the patient's performance over the preceding 24-48 hours is important, but occasionally longer periods will be relevant. 6. Middle categories imply that the patient supplies over 50 per cent of the effort. 7. Use of aids to be independent is allowed.     Score Interpretation (from [facility])    Independent   60-79 Minimally independent   40-59 Partially dependent   20-39 Very dependent   <20 Totally dependent     -Kentrell Shi., Barthel, D.W. (1965). Functional evaluation: the Barthel Index. [address] ([address] (1997). The Barthel activities of daily living index: self-reporting versus actual performance in the old (> or = 75 years). Journal [address] 45(7), [facility], J.J.M.F, Hubert Galvez., [facility]. (1999). Measuring the change in disability after inpatient rehabilitation; comparison of the responsiveness of the Barthel Index and Functional Garvin Measure. Journal of Neurology, Neurosurgery, and Psychiatry, 66(4), 176-207. TERI Stein, NAOMI High, & Essence Lindsay MDILLON. (2004) Assessment of post-stroke quality of life in cost-effectiveness studies: The usefulness of the Barthel Index and the EuroQoL-5D. Quality of Life Research, 15, 963-39        Physical Therapy Evaluation Charge Determination   History Examination Presentation Decision-Making   HIGH Complexity :3+ comorbidities / personal factors will impact the outcome/ POC  HIGH Complexity : 4+ Standardized tests and measures addressing body structure, function, activity limitation and / or participation in recreation  LOW Complexity : Stable, uncomplicated  Other outcome measures barthel  MEDIUM      Based on the above components, the patient evaluation is determined to be of the following complexity level: LOW     Pain Rating:  controlled    Activity Tolerance:   Good and Fair    After treatment patient left in no apparent distress:   Supine in bed and Call bell within reach    COMMUNICATION/EDUCATION:   The patients plan of care was discussed with: Registered nurse and Case management. Fall prevention education was provided and the patient/caregiver indicated understanding.  and Patient/family have participated as able in goal setting and plan of care.     Thank you for this referral.  Kobe Shaw, PT   Time Calculation: 15 mins

## 2023-01-10 NOTE — PROGRESS NOTES
Hospitalist asked for consult  I forwarded to Chantel Shaw NP to see with Dr Norma Anna at [facility].

## 2023-01-11 ENCOUNTER — TELEPHONE (OUTPATIENT)
Dept: CARDIOLOGY CLINIC | Age: 83
End: 2023-01-11

## 2023-01-11 ENCOUNTER — TELEPHONE (OUTPATIENT)
Dept: PRIMARY CARE CLINIC | Age: 83
End: 2023-01-11

## 2023-01-11 LAB
ATRIAL RATE: 62 BPM
CALCULATED P AXIS, ECG09: 41 DEGREES
CALCULATED R AXIS, ECG10: 30 DEGREES
CALCULATED T AXIS, ECG11: 39 DEGREES
DIAGNOSIS, 93000: NORMAL
P-R INTERVAL, ECG05: 172 MS
Q-T INTERVAL, ECG07: 432 MS
QRS DURATION, ECG06: 114 MS
QTC CALCULATION (BEZET), ECG08: 438 MS
VENTRICULAR RATE, ECG03: 62 BPM

## 2023-01-11 NOTE — TELEPHONE ENCOUNTER
Enrolled with [employer] - Ordered and being shipped to patient's home address on file. ETA within 5-7 business days. 1 month loop  Received: Today  ANGELA Dickerson Jami; Inez Nettles  Please send 1 month loop monitor for syncope.   Thanks, Olga Base

## 2023-01-11 NOTE — TELEPHONE ENCOUNTER
Kailee Phillip from [facility] said she saw that this patient had an appointment with Dr. Han Vale 2/1/23 and wondered if Dr. Han Vale would follow the patient for PT and OT before she saw the patient.    [phone]

## 2023-01-11 NOTE — TELEPHONE ENCOUNTER
Per Dr. Salma Damian:  Pt with syncope. Needs 1 month loop if you can please set this up. Will figure out follow up depending on the results. thx     Attempted to reach patient by telephone. A message was left for return call.

## 2023-01-12 NOTE — TELEPHONE ENCOUNTER
Spoke to patient's daughter, Van Gonzáles. Name noted on permission to release information. Identifiers x 2. Informed her to expect monitor in mail. Verbalized understanding.

## 2023-02-01 ENCOUNTER — OFFICE VISIT (OUTPATIENT)
Dept: PRIMARY CARE CLINIC | Age: 83
End: 2023-02-01
Payer: MEDICARE

## 2023-02-01 VITALS
HEIGHT: 60 IN | DIASTOLIC BLOOD PRESSURE: 76 MMHG | RESPIRATION RATE: 18 BRPM | SYSTOLIC BLOOD PRESSURE: 155 MMHG | WEIGHT: 141 LBS | OXYGEN SATURATION: 99 % | BODY MASS INDEX: 27.68 KG/M2 | TEMPERATURE: 98.1 F | HEART RATE: 66 BPM

## 2023-02-01 DIAGNOSIS — Z86.73 HISTORY OF CVA (CEREBROVASCULAR ACCIDENT): ICD-10-CM

## 2023-02-01 DIAGNOSIS — I10 PRIMARY HYPERTENSION: ICD-10-CM

## 2023-02-01 DIAGNOSIS — I65.23 BILATERAL CAROTID ARTERY STENOSIS: Primary | ICD-10-CM

## 2023-02-01 DIAGNOSIS — R04.0 RECURRENT EPISTAXIS: ICD-10-CM

## 2023-02-01 DIAGNOSIS — E78.5 HYPERLIPIDEMIA, UNSPECIFIED HYPERLIPIDEMIA TYPE: ICD-10-CM

## 2023-02-01 DIAGNOSIS — R55 SYNCOPE, UNSPECIFIED SYNCOPE TYPE: ICD-10-CM

## 2023-02-01 LAB — HGB BLD-MCNC: 12.6 G/DL

## 2023-02-01 NOTE — PROGRESS NOTES
Chief Complaint   Patient presents with    Alice Del Toro for vascular concerns  Dr Huber Lincoln neurology concerns  Dr Cheatham Both for cardiology concerns currently wearing heart monitor has had on for 2 weeks scheduled to wear 30 days  2 syncope events within past 6 weeks seen at [facility]     1. \"Have you been to the ER, urgent care clinic since your last visit? Hospitalized since your last visit? \" No    2. \"Have you seen or consulted any other health care providers outside of the [address] [place] [address] since your last visit? \" No     3. For patients aged 39-70: Has the patient had a colonoscopy / FIT/ Cologuard? NA - based on age      If the patient is female:    4. For patients aged 41-77: Has the patient had a mammogram within the past 2 years? No      5. For patients aged 21-65: Has the patient had a pap smear?  NA - based on age or sex

## 2023-02-01 NOTE — PROGRESS NOTES
HPI     Chief Complaint   Patient presents with    [facility] Dr Kelsea Esteban for vascular concerns  Dr Tesfaye Fraction neurology concerns  Dr Garth Vilchis for cardiology concerns currently wearing heart monitor has had on for 2 weeks scheduled to wear 30 days  2 syncope events within past 6 weeks seen at [facility]     She is a 81 y.o. female who presents for establishing care. Daughter [employer] accompanies her today. Patient is an 81-year-old female who has a history of CVA, multiple syncopal episodes with bilateral obstructive carotid disease. She has undergone recent left TCAR in December 2022    Patient has a history of multiple syncopal episodes with bilateral obstructive carotid disease. She underwent a left TCAR in December 2022. She had another hospital admission for a syncopal episode on January 8. And had a CTA of her head and neck that showed severe stenosis of the right proximal internal carotid artery 82%. Vascular plans to perform a right sided TCAR in March as sooner would increase her risk for cerebral hyperperfusion. She denies any further syncopal episodes since her last hospitalization. She does have a history of CVA and is on Plavix and high intensity Lipitor. She has been referred to neurology and has a follow-up with Dr. Hudson Bernal in February. She was evaluated by cardiology in the hospital and is currently on a Holter monitor for 30 days. The plan is if her Holter monitor does not show anything after 30 days they are considering doing an implantable device for 5 years to further monitor. Patient has hypertension currently on Norvasc. States she was told that they want her systolic blood pressure between 1 40-1 80. They are monitoring it twice daily at home. She has memory issues and is currently on Aricept for this. She started this recently and has not titrated up to 10 mg.   Daughter states she will reach out to neurology to see if they would like her to increase her dose and will call us back if she needs a refill. Daughter states she has had recurrent nosebleeds on the right side of the nose. They do not last long and typically stop easily. Review of Systems   Respiratory:  Negative for shortness of breath. Cardiovascular:  Negative for chest pain. Neurological:  Negative for facial asymmetry, speech difficulty and headaches. Reviewed PmHx, RxHx, FmHx, SocHx, AllgHx and updated and dated in the chart. Physical Exam:  Visit Vitals  BP (!) 155/76 (BP 1 Location: Right arm, BP Patient Position: Sitting)   Pulse 66   Temp 98.1 °F (36.7 °C) (Oral)   Resp 18   Ht 5' (1.524 m)   Wt 141 lb (64 kg)   SpO2 99%   BMI 27.54 kg/m²     Physical Exam  Vitals and nursing note reviewed. Constitutional:       General: She is not in acute distress. Appearance: Normal appearance. She is not ill-appearing. HENT:      Nose:      Comments: Scab present on R septum. Old blood present on R.   Cardiovascular:      Rate and Rhythm: Normal rate. Pulses: Normal pulses. Heart sounds: No murmur heard. Pulmonary:      Effort: Pulmonary effort is normal. No respiratory distress. Breath sounds: Normal breath sounds. No wheezing, rhonchi or rales. Neurological:      Mental Status: She is alert. POC Hg 12.6  Recent Results (from the past 12 hour(s))   AMB POC HEMOGLOBIN (HGB)    Collection Time: 02/01/23  2:40 PM   Result Value Ref Range    Hemoglobin (POC) 12.6 G/DL          Assessment / Plan     Diagnoses and all orders for this visit:    1. Bilateral carotid artery stenosis -patient is already established with vascular. Plan is for patient to undergo a right TCAR 8 weeks postop. 2. Recurrent epistaxis -hemoglobin is stable in office today. Recommend she see ENT to see if she has any superficial vessels that need to be cauterized. Can use saline spray in the meantime and also try humidifier in her room. Avoid allergy nasal sprays at this time.   -     AMB POC HEMOGLOBIN (HGB)  -     REFERRAL TO ENT-OTOLARYNGOLOGY    3. History of CVA (cerebrovascular accident) -she is on dual antiplatelet therapy. She has a follow-up appointment with neurology at the end of this month. 4. Primary hypertension -blood pressure is slightly elevated today however within her goal range per the patient. 5. Hyperlipidemia, unspecified hyperlipidemia type    6. Syncope, unspecified syncope type -this is currently being worked up by cardiology, neurology and vascular. I have discussed the diagnosis with the patient and the intended plan as seen in the above orders. The patient has received an after-visit summary and questions were answered concerning future plans. I have discussed medication side effects and warnings with the patient as well.     James Arciniega, DO

## 2023-02-13 ENCOUNTER — TELEPHONE (OUTPATIENT)
Dept: NEUROLOGY | Age: 83
End: 2023-02-13

## 2023-02-13 NOTE — TELEPHONE ENCOUNTER
Called patient's daughter. No answer. Left a VM stating the medication was given in the hospital. She has an appt on 2/28 with one of our NP's. Informed her that she can discuss this at her appointment as she will need to be assessed by the provider.

## 2023-02-13 NOTE — TELEPHONE ENCOUNTER
Pt daughter calling to request rx for aricept. States pt was started on 5 mg in hospital and is tolerating it well.  Says they were told to increase to 10 mg if pt is tolerating it well.,

## 2023-02-27 ENCOUNTER — HOSPITAL ENCOUNTER (OUTPATIENT)
Dept: MAMMOGRAPHY | Age: 83
Discharge: HOME | End: 2023-02-27
Payer: MEDICARE

## 2023-02-27 ENCOUNTER — HOSPITAL ENCOUNTER (OUTPATIENT)
Dept: MAMMOGRAPHY | Age: 83
End: 2023-02-27
Payer: MEDICARE

## 2023-02-27 ENCOUNTER — DOCUMENTATION ONLY (OUTPATIENT)
Dept: CARDIOLOGY CLINIC | Age: 83
End: 2023-02-27

## 2023-02-27 ENCOUNTER — TELEPHONE (OUTPATIENT)
Dept: CARDIOLOGY CLINIC | Age: 83
End: 2023-02-27

## 2023-02-27 DIAGNOSIS — I48.91 ATRIAL FIBRILLATION, UNSPECIFIED TYPE (HCC): Primary | ICD-10-CM

## 2023-02-27 DIAGNOSIS — R92.8 ABNORMAL MAMMOGRAM: ICD-10-CM

## 2023-02-27 PROCEDURE — 77061 BREAST TOMOSYNTHESIS UNI: CPT

## 2023-02-27 NOTE — TELEPHONE ENCOUNTER
MD Molly Winston MD; Taiwo Venegas RN  Cc: Maryana Cornejo RN  Caller: Unspecified (Today,  7:14 AM)  Pt with afib on her monitor (new finding), needs to start eliquis 5 mg bid for [facility]. I can see her this Weds or Thursday to discuss the results.  thx Vaccine status unknown

## 2023-02-28 NOTE — TELEPHONE ENCOUNTER
Jossy Gonzalez. Notified her of Dr. Phani Uriostegui message for patient to continue aspirin, stop plavix when starting eliquis. She said patient has an appt this afternoon so they will  30 day free trial co-pay card from . She denied further questions or concerns. Requested Prescriptions     Signed Prescriptions Disp Refills    apixaban (ELIQUIS) 5 mg tablet 60 Tablet 1     Sig: Take 1 Tablet by mouth two (2) times a day.      Authorizing Provider: Eugene Giraldo     Ordering User: Kasi Sawyer    Per Dr. Phani Uriostegui VO

## 2023-02-28 NOTE — TELEPHONE ENCOUNTER
Addended by: VANESSA MCKOY on: 3/15/2017 01:59 PM     Modules accepted: Orders     MD Hayden Villafana, RN  Caller: Unspecified (Today,  8:08 AM)  Keep ASA, stop plavix as we discussed.  thx

## 2023-02-28 NOTE — TELEPHONE ENCOUNTER
Called patient. Spoke with pt's daughter, [facility]. Verified patient's identity with two identifiers and HIPAA. Notified her of Dr. Bella Noonan message. She said patient can go ahead and start eliquis and they will keep March 17th appointment, but she asked if patient should stop aspirin or continue it. I told her I would verify with Dr. Nickie Licona and call her back. Patient is taking plavix also so I will make sure patient knows to continue that (s/p carotid stent).

## 2023-03-10 ENCOUNTER — TELEPHONE (OUTPATIENT)
Dept: PRIMARY CARE CLINIC | Age: 83
End: 2023-03-10

## 2023-03-10 NOTE — TELEPHONE ENCOUNTER
Looked into patient med list - looks like the aspirin was discontinued on 01/09/2023 by Hanna Abrams MD.

## 2023-03-14 NOTE — TELEPHONE ENCOUNTER
Sent patient mychart message - I'm not sure who the doctor is or why they discontinued it. Please let them know it was previously discontinued by this doctor.

## 2023-03-15 ENCOUNTER — TELEPHONE (OUTPATIENT)
Dept: PRIMARY CARE CLINIC | Age: 83
End: 2023-03-15

## 2023-03-15 RX ORDER — ASPIRIN 81 MG/1
81 TABLET ORAL DAILY
Qty: 90 TABLET | Refills: 1 | Status: SHIPPED | OUTPATIENT
Start: 2023-03-15

## 2023-03-15 NOTE — TELEPHONE ENCOUNTER
Patient's daughter called. Said aspirin was not supposed to be discontinued and they have no idea who some of these doctors are in the patient's chart. If [facility] can not fill the medication, they need to know so they can call someone else.

## 2023-03-16 NOTE — TELEPHONE ENCOUNTER
Spoke to daughter (on [place]) let her know that the Aspirin has been sent to pharm. Explained Dr. Rivas Matter note about the confusion on when it was marked as discontinued. Patient's daughter verbalized understanding and had no other questions at this time.

## 2023-03-17 ENCOUNTER — OFFICE VISIT (OUTPATIENT)
Dept: NEUROLOGY | Age: 83
End: 2023-03-17

## 2023-03-17 ENCOUNTER — OFFICE VISIT (OUTPATIENT)
Dept: CARDIOLOGY CLINIC | Age: 83
End: 2023-03-17

## 2023-03-17 VITALS
RESPIRATION RATE: 16 BRPM | WEIGHT: 141 LBS | HEIGHT: 60 IN | SYSTOLIC BLOOD PRESSURE: 132 MMHG | OXYGEN SATURATION: 98 % | BODY MASS INDEX: 27.68 KG/M2 | DIASTOLIC BLOOD PRESSURE: 60 MMHG | HEART RATE: 83 BPM

## 2023-03-17 VITALS
DIASTOLIC BLOOD PRESSURE: 70 MMHG | OXYGEN SATURATION: 98 % | RESPIRATION RATE: 20 BRPM | TEMPERATURE: 97.5 F | SYSTOLIC BLOOD PRESSURE: 156 MMHG | HEART RATE: 66 BPM

## 2023-03-17 DIAGNOSIS — I65.23 CAROTID STENOSIS, BILATERAL: ICD-10-CM

## 2023-03-17 DIAGNOSIS — I48.0 PAROXYSMAL A-FIB (HCC): Primary | ICD-10-CM

## 2023-03-17 DIAGNOSIS — F03.B0 MODERATE DEMENTIA WITHOUT BEHAVIORAL DISTURBANCE, PSYCHOTIC DISTURBANCE, MOOD DISTURBANCE, OR ANXIETY, UNSPECIFIED DEMENTIA TYPE: Primary | ICD-10-CM

## 2023-03-17 DIAGNOSIS — Z79.01 CHRONIC ANTICOAGULATION: ICD-10-CM

## 2023-03-17 PROBLEM — G30.9 ALZHEIMER'S DISEASE, UNSPECIFIED (CODE) (HCC): Status: ACTIVE | Noted: 2023-03-17

## 2023-03-17 RX ORDER — CLOPIDOGREL BISULFATE 75 MG/1
TABLET ORAL
COMMUNITY
Start: 2023-03-15 | End: 2023-03-17

## 2023-03-17 RX ORDER — DONEPEZIL HYDROCHLORIDE 5 MG/1
TABLET, FILM COATED ORAL
Qty: 30 TABLET | Refills: 0 | Status: SHIPPED | OUTPATIENT
Start: 2023-03-17

## 2023-03-17 RX ORDER — DONEPEZIL HYDROCHLORIDE 10 MG/1
10 TABLET, FILM COATED ORAL
Qty: 90 TABLET | Refills: 1 | Status: SHIPPED | OUTPATIENT
Start: 2023-04-17 | End: 2023-03-17

## 2023-03-17 NOTE — PROGRESS NOTES
[facility] [place]: Todd Parkinson  [phone]    History of Present Illness:  Ms. Ailyn Riggins is an 82 yo F with recent hospitalization for CVA and syncopal episode status post left TCAR with vascular on 12/30/2022, plans for right carotid TCAR in mid-April followed by vascular Dr. Antoine Marshall, essential hypertension, history of nosebleeds, here for cardiac evaluation. Since her hospitalization, overall she has been steadily improving. She has been doing physical therapy and occupational therapy. From a symptom standpoint, she denies any palpitations. No exertional chest pain. Her breathing has been stable. She is accompanied by her daughter Eddie Telles, who helps give a history. She did see Dr. Antoine Marshall a few days ago. Post hospitalization, she was scheduled to wear a heart monitor. This did end up showing episodes of atrial fibrillation with heart rate sometimes in the 120s when she was in atrial fibrillation, though at times when she was in sinus rhythm, her heart rate was in the 40s. They did note that they have been trying to keep her blood pressure higher in the 791A-551V systolic. We did go ahead and have her start Eliquis for the stroke after monitor results a few days ago at 5 mg twice a day given her age, weight and kidney function. She is compensated on exam with clear lungs and no lower extremity edema. Assessment and Plan:   1. Paroxysmal atrial fibrillation. She is stable in sinus rhythm here. Even when she went fast in afib, she was just in the 120s bpm and she is having episodes where her heart goes slower than 40s, in the evening when she is asleep. She is not symptomatic with her atrial fibrillation and for now, does not need medication for rate control. 2. Chronic anticoagulation. Her CHADS VASC score is high and she has already had a stroke. Recommended Eliquis and she has been able to tolerate this well. Complex issue is that she also has carotid disease.   She already had carotid TCAR on the left in 12/2022 has been on aspirin and Plavix. Given the bleeding risks, went ahead and had her stop her Plavix when she started Eliquis and continue aspirin. There is a tentative plan for doing at [facility] on her right carotid in mid-April. She has been told at that time of surgery, they would have her restart Plavix seven days prior, hold Eliquis two days prior and that she would be on aspirin, Plavix and Eliquis for one month and then be able to go back on just Eliquis and aspirin. We will reach out to Dr. Xavier Duarte. Would defer to vascular whether or not given her distribution of her CVA, whether the benefits outweigh the risks of intervention with TCAR and committing her to higher degree of blood thinners even for a brief period of time. She says they do have an appointment with ENT and encouraged them to make this given her h/o nosebleeds. 3. CVA. 4. Essential hypertension. The goal blood pressure at this time per vascular is between 589-929 systolic per vascular. She  has a past medical history of Breast CA ([facility] [address] CAD (coronary artery disease), Cancer ([facility] [address] Dementia ([facility] [address] Diabetes ([facility] [address] Hypertension, Psychiatric disorder, Radiation therapy complication, and Stroke ([facility] [address] All other systems negative except as above. PE  Vitals:    03/17/23 1506   BP: 132/60   Pulse: 83   Resp: 16   SpO2: 98%   Weight: 141 lb (64 kg)   Height: 5' (1.524 m)    Body mass index is 27.54 kg/m².   General appearance - alert, well appearing, and in no distress  Mental status - affect appropriate to mood  Eyes - sclera anicteric, moist mucous membranes  Neck - supple, no JVD  Chest - clear to auscultation, no wheezes, rales or rhonchi  Heart - irregularly irregular, normal S1, S2, no murmurs, rubs, clicks or gallops  Abdomen - soft, nontender, nondistended, no masses or organomegaly  Back exam - full range of motion, no tenderness  Neurological - no focal deficits  Extremities - peripheral pulses normal, no pedal edema      Recent Labs:  No results found for: CHOL, CHOLX, CHLST, CHOLV, 798689, HDL, HDLP, LDL, LDLC, DLDLP, TGLX, TRIGL, TRIGP, CHHD, CHHDX  Lab Results   Component Value Date/Time    Creatinine (POC) 0.8 2022 01:15 PM    Creatinine 0.65 01/10/2023 04:19 AM     Lab Results   Component Value Date/Time    BUN 11 01/10/2023 04:19 AM    BUN 15 2022 01:15 PM     Lab Results   Component Value Date/Time    Potassium 3.6 01/10/2023 04:19 AM     Lab Results   Component Value Date/Time    Hemoglobin A1c 5.7 (H) 2022 11:15 AM     Lab Results   Component Value Date/Time    Hemoglobin (POC) 12.6 2023 02:40 PM    HGB 9.6 (L) 01/10/2023 04:19 AM     Lab Results   Component Value Date/Time    PLATELET 962  04:19 AM       Reviewed:  Past Medical History:   Diagnosis Date    Breast CA ([facility] [address]     LEFT    CAD (coronary artery disease)     Cancer ([facility] [address]     Dementia ([facility] [address]     POSSIBLE-BEING EVALUATED    Diabetes ([facility] [address]     PT STATES ISN'T DIABETIC ANYMORE    Hypertension     Psychiatric disorder     Radiation therapy complication     7016    Stroke [facility])      Social History     Tobacco Use   Smoking Status Former    Packs/day: 1.00    Years: 38.00    Pack years: 38.00    Types: Cigarettes    Quit date:     Years since quittin.2    Passive exposure: Past   Smokeless Tobacco Never     Social History     Substance and Sexual Activity   Alcohol Use Not Currently     No Known Allergies    Current Outpatient Medications   Medication Sig    donepeziL (ARICEPT) 5 mg tablet Take 1 pill nightly for 30 days. aspirin delayed-release 81 mg tablet Take 1 Tablet by mouth daily. apixaban (ELIQUIS) 5 mg tablet Take 1 Tablet by mouth two (2) times a day. tetrahydrozoline/polyethyl gly (EYE DROPS OP) Apply  to eye as needed. lisinopriL (PRINIVIL, ZESTRIL) 20 mg tablet Take  by mouth Every morning. amLODIPine (NORVASC) 5 mg tablet Take 5 mg by mouth Every morning.     atorvastatin (LIPITOR) 80 mg tablet Take 80 mg by mouth nightly. [START ON 4/17/2023] donepeziL (ARICEPT) 10 mg tablet Take 1 Tablet by mouth nightly. (Patient not taking: Reported on 3/17/2023)    clopidogreL (PLAVIX) 75 mg tab  (Patient not taking: Reported on 3/17/2023)     No current facility-administered medications for this visit.        Desiree Perez MD  [facility] [place]  [facility] [address] [facility] [address]  [address]

## 2023-03-17 NOTE — PROGRESS NOTES
Shalini Reid is a 81 y.o. female who presents with the following  Chief Complaint   Patient presents with    Hospital Follow Up       HPI  Patient is here today for hospital follow-up. Patient was seen by Dr. Amanda Amaral January 9 after she was admitted for near syncope. Patient has moderate dementia and a history of stroke (while living in [place]). Patient's daughter had witnessed the event and reports that the patient had just showered and was found standing in the doorway of her room a few steps from the shower unclothed leaning on the frame not responding with eyes open. She was lowered to the ground. She had complaints of being lightheaded and remained sitting due to vomiting. When she stopped vomiting she was at baseline. She never had any loss of consciousness, and no seizure activity. She was advised in October 2022 after finding she had a stroke at a hospital in [place] that she would need cardiac monitoring after discharge but because she was moving to [place] to live with her daughter this was never done. It was later found that she had bilateral ICA stenosis and had left transcarotid artery revascularization on January 2, 2023, and the right side (82% stenosis) is scheduled for April 14, 2023. She is on aspirin 81 mg and Lipitor 80 mg daily at home. Dr. Amanda Amaral started the patient on Aricept 5 mg at discharge for her dementia. Patient states that she has not had this medication since she ran out of it and could not get a refill. Since her discharge she has seen cardiology and had A-fib with RVR on her external loop monitor. She is currently taking Eliquis 5 mg twice daily for this. Plavix was stopped when Eliquis was started. She is not had any episodes of syncope or near syncope since this occurred. She was in physical therapy that ended yesterday.    Today the patient could tell me the day, the year, the current president, with some help she could tell me the previous presidents name, after a few minutes she could tell me that Alecia was the president before Fiordaliza, how many quarters it takes to make $0.75, what state we are in, where she is today and she recalled 3 out of 3 words. She could not tell me the season or the floor that we are on today. No Known Allergies    Current Outpatient Medications   Medication Sig    donepeziL (ARICEPT) 5 mg tablet Take 1 pill nightly for 30 days. aspirin delayed-release 81 mg tablet Take 1 Tablet by mouth daily. apixaban (ELIQUIS) 5 mg tablet Take 1 Tablet by mouth two (2) times a day. tetrahydrozoline/polyethyl gly (EYE DROPS OP) Apply  to eye as needed. lisinopriL (PRINIVIL, ZESTRIL) 20 mg tablet Take  by mouth Every morning. amLODIPine (NORVASC) 5 mg tablet Take 5 mg by mouth Every morning. atorvastatin (LIPITOR) 80 mg tablet Take 80 mg by mouth nightly. clopidogreL (PLAVIX) 75 mg tab  (Patient not taking: No sig reported)     No current facility-administered medications for this visit.        Social History     Tobacco Use   Smoking Status Former    Packs/day: 1.00    Years: 38.00    Pack years: 38.00    Types: Cigarettes    Quit date:     Years since quittin.2    Passive exposure: Past   Smokeless Tobacco Never       Past Medical History:   Diagnosis Date    Breast CA ([facility] [address]     LEFT    CAD (coronary artery disease)     Cancer ([facility] [address]     Dementia ([facility] [address]     POSSIBLE-BEING EVALUATED    Diabetes ([facility] [address]     PT STATES ISN'T DIABETIC ANYMORE    Hypertension     Psychiatric disorder     Radiation therapy complication     7288    Stroke [facility])        Past Surgical History:   Procedure Laterality Date    HX BREAST BIOPSY Left     2008    HX BREAST LUMPECTOMY Left     2008    HX CATARACT REMOVAL Bilateral 2018    HX CHOLECYSTECTOMY      HX ORTHOPAEDIC      SHOULDER FRACTURE    HX PARTIAL HYSTERECTOMY         Family History   Problem Relation Age of Onset    Parkinson's Disease Mother     Cancer Father         GI Cancer Sister         BREAST    Parkinson's Disease Sister     Parkinson's Disease Brother     Other Brother         HEALTH PROBS R/T WAR    Anesth Problems Neg Hx        Social History     Socioeconomic History    Marital status:    Tobacco Use    Smoking status: Former     Packs/day: 1.00     Years: 38.00     Pack years: 38.00     Types: Cigarettes     Quit date:      Years since quittin.2     Passive exposure: Past    Smokeless tobacco: Never   Vaping Use    Vaping Use: Never used   Substance and Sexual Activity    Alcohol use: Not Currently    Drug use: Never     Social Determinants of Health     Financial Resource Strain: Low Risk     Difficulty of Paying Living Expenses: Not hard at all   Food Insecurity: No Food Insecurity    Worried About Running Out of Food in the Last Year: Never true    Ran Out of Food in the Last Year: Never true       ROS    Remainder of comprehensive review is negative. Physical Exam :    Visit Vitals  BP (!) 156/70 (BP 1 Location: Right upper arm, BP Patient Position: Sitting, BP Cuff Size: Adult)   Pulse 66   Temp 97.5 °F (36.4 °C)   Resp 20   SpO2 98%       General: Well defined, nourished, and groomed individual in no acute distress. Neck: Supple, nontender, no bruits, no pain with resistance to active range of motion. Musculoskeletal: Extremities revealed no edema and had full range of motion of joints. Psych: Good mood and bright affect    NEUROLOGICAL EXAMINATION:    Mental Status: Alert and oriented to person, place, and time    Cranial Nerves:    II, III, IV, VI: Visual acuity grossly intact. Visual fields are normal.    Pupils are equal, round, and reactive to light and accommodation. Extra-ocular movements are full and fluid. Fundoscopic exam was benign, no ptosis or nystagmus. V-XII: Hearing is grossly intact. Facial features are symmetric, with normal sensation and strength. The palate rises symmetrically and the tongue protrudes midline. Sternocleidomastoids 5/5. Motor Examination: Normal tone, bulk, and strength, 5/5 muscle strength throughout. Coordination: Finger to nose was normal. No resting or intention tremor    Gait and Station: Steady while walking. Normal arm swing. No pronator drift. No muscle wasting or fasiculations noted. Reflexes: DTRs 2+ throughout. Results for orders placed or performed in visit on 02/01/23   AMB POC HEMOGLOBIN (HGB)   Result Value Ref Range    Hemoglobin (POC) 12.6 G/DL       Orders Placed This Encounter    donepeziL (ARICEPT) 5 mg tablet     Sig: Take 1 pill nightly for 30 days. Dispense:  30 Tablet     Refill:  0    DISCONTD: donepeziL (ARICEPT) 10 mg tablet     Sig: Take 1 Tablet by mouth nightly. Dispense:  90 Tablet     Refill:  1       1. Moderate dementia without behavioral disturbance, psychotic disturbance, mood disturbance, or anxiety, unspecified dementia type      Patient's daughter states that while Dr. Juan Alberto Nichols started the patient on Aricept 5 mg daily in the hospital after she finished that month of medication, she was unable to obtain further refills and so she has been without it. I advised the patient and her daughter that I would send a refill of the 5 mg dose today and to restart this for 1 month. After 1 month she will go to 10 mg daily. Sent a prescription for both size tablets to her pharmacy today. Patient has an appointment with Dr. Janet Riley, Cardiology this afternoon to talk about her monitor results and treatment options. Patient would like her next follow-up to be at the [place] office since this is very close to their home. Patient's daughter states that she will call them to set up an appointment.       This note will not be viewable in [facility]ubl[facility]t

## 2023-03-17 NOTE — LETTER
Patient:  Manny Barkley   YOB: 1940  Date of Visit: 3/17/2023      Dear Benito Sifuentes MD  [address] [place] [id number]  Via Fax: [address] [address]  Via In Basket:       Ms. Eren Gonzales is an 80 yo F with recent hospitalization for CVA and syncopal episode status post left TCAR with vascular on 12/30/2022, plans for right carotid TCAR in mid-April followed by vascular DrBlanca So, essential hypertension, history of nosebleeds, here for cardiac evaluation. Since her hospitalization, overall she has been steadily improving. She has been doing physical therapy and occupational therapy. From a symptom standpoint, she denies any palpitations. No exertional chest pain. Her breathing has been stable. She is accompanied by her daughter Leilani Dong, who helps give a history. She did see Dr. Carine So a few days ago. Post hospitalization, she was scheduled to wear a heart monitor. This did end up showing episodes of atrial fibrillation with heart rate sometimes in the 120s when she was in atrial fibrillation, though at times when she was in sinus rhythm, her heart rate was in the 40s. They did note that they have been trying to keep her blood pressure higher in the [id number]-[id number] systolic. We did go ahead and have her start Eliquis for the stroke after monitor results a few days ago at 5 mg twice a day given her age, weight and kidney function. She is compensated on exam with clear lungs and no lower extremity edema. Assessment and Plan:   1. Paroxysmal atrial fibrillation. She is stable in sinus rhythm here. Even when she went fast in afib, she was just in the 120s bpm and she is having episodes where her heart goes slower than 40s, in the evening when she is asleep. She is not symptomatic with her atrial fibrillation and for now, does not need medication for rate control. 2. Chronic anticoagulation.   Her CHADS VASC score is high and she has already had a stroke. Recommended Eliquis and she has been able to tolerate this well. Complex issue is that she also has carotid disease. She already had carotid TCAR on the left in 12/2022 has been on aspirin and Plavix. Given the bleeding risks, went ahead and had her stop her Plavix when she started Eliquis and continue aspirin. There is a tentative plan for doing at [facility] on her right carotid in mid-April. She has been told at that time of surgery, they would have her restart Plavix seven days prior, hold Eliquis two days prior and that she would be on aspirin, Plavix and Eliquis for one month and then be able to go back on just Eliquis and aspirin. We will reach out to Dr. Can Bill. Would defer to vascular whether or not given her distribution of her CVA, whether the benefits outweigh the risks of intervention with TCAR and committing her to higher degree of blood thinners even for a brief period of time. She says they do have an appointment with ENT and encouraged them to make this given her h/o nosebleeds. 3. CVA. 4. Essential hypertension. The goal blood pressure at this time per vascular is between 994-879 systolic per vascular. If you have questions, please do not hesitate to call me.     Sincerely,      Kristen Gonzalez MD

## 2023-03-17 NOTE — PROGRESS NOTES
A-fib was discovered after her heart monitor   Has been doing okay since she was in the hospital   Plavix was taken off   Therapy was finished yesterday

## 2023-03-20 ENCOUNTER — TELEPHONE (OUTPATIENT)
Dept: CARDIOLOGY CLINIC | Age: 83
End: 2023-03-20

## 2023-03-20 NOTE — TELEPHONE ENCOUNTER
----- Message from Darby Kenyon MD sent at 3/20/2023 12:55 PM EDT -----  Please let pt and daughter know I touched base personally with Dr. Lokesh Seth and reviewed her case and my concerns. He does think she would still benefit from the procedure on her right carotid. He described to me how he is approaching the blood thinners. Just make sure to confirm with them what blood thinners and when pre/during and post op.  thx

## 2023-03-22 NOTE — TELEPHONE ENCOUNTER
Spoke to patient's daughter, Yuniel Lewis. Name noted on permission to release information. Identifiers x 2. Informed that Dr. Veronica Coronado had spoken to Dr. Nanci Crum. She states plan is in place regarding blood thinners pre and post surgery. She will contact Dr. Michael Danielson office regarding any changes needed. To call our office regarding any needs. Confirmed follow up .     Future Appointments   Date Time Provider Dany Michaud   4/5/2023 11:00 AM [facility] PAT EXAM RM 3 [address]   5/22/2023  3:40 PM MD RAVI Solorzano AMB

## 2023-04-05 ENCOUNTER — HOSPITAL ENCOUNTER (OUTPATIENT)
Dept: PREADMISSION TESTING | Age: 83
End: 2023-04-05
Payer: MEDICARE

## 2023-04-05 LAB
ABO + RH BLD: NORMAL
ALBUMIN SERPL-MCNC: 3.5 G/DL (ref 3.5–5)
ALBUMIN/GLOB SERPL: 1.1 (ref 1.1–2.2)
ALP SERPL-CCNC: 88 U/L (ref 45–117)
ALT SERPL-CCNC: 36 U/L (ref 12–78)
ANION GAP SERPL CALC-SCNC: 2 MMOL/L (ref 5–15)
APTT PPP: 29.7 SEC (ref 22.1–31)
AST SERPL-CCNC: 30 U/L (ref 15–37)
ATRIAL RATE: 60 BPM
BASOPHILS # BLD: 0 K/UL (ref 0–0.1)
BASOPHILS NFR BLD: 0 % (ref 0–1)
BILIRUB SERPL-MCNC: 0.4 MG/DL (ref 0.2–1)
BLOOD GROUP ANTIBODIES SERPL: NORMAL
BUN SERPL-MCNC: 16 MG/DL (ref 6–20)
BUN/CREAT SERPL: 19 (ref 12–20)
CALCIUM SERPL-MCNC: 9.4 MG/DL (ref 8.5–10.1)
CALCULATED P AXIS, ECG09: 15 DEGREES
CALCULATED R AXIS, ECG10: 21 DEGREES
CALCULATED T AXIS, ECG11: 38 DEGREES
CHLORIDE SERPL-SCNC: 110 MMOL/L (ref 97–108)
CO2 SERPL-SCNC: 28 MMOL/L (ref 21–32)
CREAT SERPL-MCNC: 0.83 MG/DL (ref 0.55–1.02)
DIAGNOSIS, 93000: NORMAL
DIFFERENTIAL METHOD BLD: ABNORMAL
EOSINOPHIL # BLD: 0 K/UL (ref 0–0.4)
EOSINOPHIL NFR BLD: 0 % (ref 0–7)
ERYTHROCYTE [DISTWIDTH] IN BLOOD BY AUTOMATED COUNT: 12.5 % (ref 11.5–14.5)
GLOBULIN SER CALC-MCNC: 3.1 G/DL (ref 2–4)
GLUCOSE SERPL-MCNC: 104 MG/DL (ref 65–100)
HCT VFR BLD AUTO: 38.3 % (ref 35–47)
HGB BLD-MCNC: 12.3 G/DL (ref 11.5–16)
IMM GRANULOCYTES # BLD AUTO: 0 K/UL (ref 0–0.04)
IMM GRANULOCYTES NFR BLD AUTO: 0 % (ref 0–0.5)
INR PPP: 1 (ref 0.9–1.1)
LYMPHOCYTES # BLD: 1.5 K/UL (ref 0.8–3.5)
LYMPHOCYTES NFR BLD: 22 % (ref 12–49)
MCH RBC QN AUTO: 32.5 PG (ref 26–34)
MCHC RBC AUTO-ENTMCNC: 32.1 G/DL (ref 30–36.5)
MCV RBC AUTO: 101.3 FL (ref 80–99)
MONOCYTES # BLD: 0.4 K/UL (ref 0–1)
MONOCYTES NFR BLD: 6 % (ref 5–13)
NEUTS SEG # BLD: 5 K/UL (ref 1.8–8)
NEUTS SEG NFR BLD: 72 % (ref 32–75)
NRBC # BLD: 0 K/UL (ref 0–0.01)
NRBC BLD-RTO: 0 PER 100 WBC
P-R INTERVAL, ECG05: 152 MS
PLATELET # BLD AUTO: 193 K/UL (ref 150–400)
PMV BLD AUTO: 11.2 FL (ref 8.9–12.9)
POTASSIUM SERPL-SCNC: 4.4 MMOL/L (ref 3.5–5.1)
PROT SERPL-MCNC: 6.6 G/DL (ref 6.4–8.2)
PROTHROMBIN TIME: 10.7 SEC (ref 9–11.1)
Q-T INTERVAL, ECG07: 424 MS
QRS DURATION, ECG06: 102 MS
QTC CALCULATION (BEZET), ECG08: 424 MS
RBC # BLD AUTO: 3.78 M/UL (ref 3.8–5.2)
SODIUM SERPL-SCNC: 140 MMOL/L (ref 136–145)
SPECIMEN EXP DATE BLD: NORMAL
THERAPEUTIC RANGE,PTTT: NORMAL SECS (ref 58–77)
VENTRICULAR RATE, ECG03: 60 BPM
WBC # BLD AUTO: 6.9 K/UL (ref 3.6–11)

## 2023-04-05 PROCEDURE — 36415 COLL VENOUS BLD VENIPUNCTURE: CPT

## 2023-04-05 PROCEDURE — 85025 COMPLETE CBC W/AUTO DIFF WBC: CPT

## 2023-04-05 PROCEDURE — 86900 BLOOD TYPING SEROLOGIC ABO: CPT

## 2023-04-05 PROCEDURE — 93005 ELECTROCARDIOGRAM TRACING: CPT

## 2023-04-05 PROCEDURE — 85610 PROTHROMBIN TIME: CPT

## 2023-04-05 PROCEDURE — 85730 THROMBOPLASTIN TIME PARTIAL: CPT

## 2023-04-05 PROCEDURE — 80053 COMPREHEN METABOLIC PANEL: CPT

## 2023-04-05 NOTE — PERIOP NOTES
Preoperative instructions reviewed with patient and her granddaughter, Clayton Noriega. .  Patient given SSI infection FAQS sheet. Given two bottles of skin prep chlorhexidine soap; given written and verbal instructions on use. Patient and Shokan Shawnmilton were given the opportunity to ask questions on the information provided. Incentive Spirometer given to patient, instructed on use and she demonstrated proper usage. Patient can sign her own consents, per Clayton Noriega. Advised to have Fidel Marroquin (daughter and POA) bring POA papers day of surgery.

## 2023-04-05 NOTE — PERIOP NOTES
[facility]'S  CARDIAC SURGERY PREOPERATIVE INSTRUCTIONS    Surgery Date:   4/14/23    Your surgeon's office or [facility] staff will call you between 4 PM- 8 PM the day before surgery with your arrival time. If your surgery is on a Monday, you will receive a call the preceding Friday. Please report to [facility] Patient Access/Admitting on the 1st floor. Bring your insurance card, photo identification, and any copayment (if applicable). If you are going home the same day of your surgery, you must have a responsible adult to drive you home. You need to have a responsible adult to stay with you the first 24 hours after surgery and you should not drive a car for 24 hours following your surgery. Nothing to eat or drink after midnight the night before surgery. This includes no water, gum, mints, coffee, juice, etc.    Do NOT drink alcohol or smoke 24 hours before surgery. STOP smoking for 14 days prior as it helps with breathing and healing after surgery. If you are being admitted to the hospital, please leave personal belongings/luggage in your car until you have an assigned hospital room number. Please wear comfortable clothes. Wear your glasses instead of contacts. We ask that all money, jewelry and valuables be left at home. Wear no make up, particularly mascara, the day of surgery. All body piercings, rings, and jewelry need to be removed and left at home. Please remove any nail polish or artificial nails from your fingernails. Please wear your hair loose or down. Please no pony-tails, buns, or any metal hair accessories. When you shower the morning of surgery, please do not apply any lotions or powders afterwards. You may wear deodorant, unless having breast surgery. Do not shave any body area within 24 hours of your surgery. Please follow all instructions to avoid any potential surgical cancellation.   Should your physical condition change, (i.e. fever, cold, flu, etc.) please notify your surgeon as soon as possible. It is important to be on time. If a situation occurs where you may be delayed, please call:  [phone] / [phone] on the day of surgery. The Preadmission Testing staff can be reached at [phone]. Special instructions: FOLLOW ANY FURTHER INSTRUCTIONS SPROVIDED TO YOU BY DR. Lui Waldron OFFICE. BRING PAPERWORK FOR SYLVAIN ABARCA. **Your surgeon will instruct you on which medications to continue/hold prior to surgery**      Incentive Spirometer        Using the incentive spirometer helps expand the small air sacs of your lungs, helps you breathe deeply, and helps improve your lung function. Use your incentive spirometer twice a day (10 breaths each time) prior to surgery. How to Use Your Incentive Spirometer:  Hold the incentive spirometer in an upright position. Breathe out as usual.   Place the mouthpiece in your mouth and seal your lips tightly around it. Take a deep breath. Breathe in slowly and as deeply as possible. Keep the blue flow rate guide between the arrows. Hold your breath as long as possible. Then exhale slowly and allow the piston to fall to the bottom of the column. Rest for a few seconds and repeat steps one through five at least 10 times. Preventing Infections Before and After - Your Surgery    IMPORTANT INSTRUCTIONS      You play an important role in your health and preparation for surgery. To reduce the germs on your skin you will need to shower with CHG soap (Chorhexidine gluconate 4%) two times before surgery. CHG soap (Hibiclens, Hex-A-Clens or store brand)  CHG soap will be provided at your Preadmission Testing (PAT) appointment. If you do not have a PAT appointment before surgery, you may arrange to  CHG soap from our office or purchase CHG soap at a pharmacy, grocery or department store. You need to purchase TWO 4 ounce bottles to use for your 2 showers.     Steps to follow:  Wash your hair with your normal shampoo and your body with regular soap and rinse well to remove shampoo and soap from your skin. Wet a clean washcloth and turn off the shower. Put CHG soap on washcloth and apply to your entire body from the neck down. Do not use on your head, face or private parts(genitals). Do not use CHG soap on open sores, wounds or areas of skin irritation. Wash you body gently for 5 minutes. Do not wash your skin too hard. This soap does not create lather. Pay special attention to your underarms and from your belly button to your feet. Turn the shower back on and rinse well to get CHG soap off your body. Pat your skin dry with a clean, dry towel. Do not apply lotions or moisturizer. Put on clean clothes and sleep on fresh bed sheets and do not allow pets to sleep with you. Shower with CHG soap 2 times before your surgery  The evening before your surgery  The morning of your surgery      Tips to help prevent infections after your surgery:  Protect your surgical wound from germs:  Hand washing is the most important thing you and your caregivers can do to prevent infections. Keep your bandage clean and dry! Do not touch your surgical wound. Use clean, freshly washed towels and washcloths every time you shower; do not share bath linens with others. Until your surgical wound is healed, wear clothing and sleep on bed linens each day that are clean. Do not allow pets to sleep in your bed with you or touch your surgical wound. Do not smoke - smoking delays wound healing. This may be a good time to stop smoking. If you have diabetes, it is important for you to manage your blood sugar levels properly before your surgery as well as after your surgery. Poorly managed blood sugar levels slow down wound healing and prevent you from healing completely. Patient Information Regarding COVID Restrictions    Day of Procedure    Please park in the parking deck or any designated visitor parking lot.   Enter the facility through the Main Entrance of the hospital.  On the day of surgery, please provide the cell phone number of the person who will be waiting for you to the Patient Access representative at the time of registration. Masks are highly recommended in the hospital, but not required. Once your procedure and the immediate recovery period is completed, a nurse in the recovery area will contact your designated visitor to inform them of your room number or to otherwise review other pertinent information regarding your care. Social distancing practices are strongly encouraged in waiting areas and the cafeteria. The patient and granddaughter, Sarah Page were contacted  in person. They verbalized understanding of all instructions do not  need reinforcement.

## 2023-04-14 ENCOUNTER — APPOINTMENT (OUTPATIENT)
Dept: GENERAL RADIOLOGY | Age: 83
DRG: 036 | End: 2023-04-14
Attending: SURGERY
Payer: MEDICARE

## 2023-04-14 ENCOUNTER — HOSPITAL ENCOUNTER (INPATIENT)
Age: 83
LOS: 1 days | Discharge: HOME | DRG: 036 | End: 2023-04-15
Attending: SURGERY | Admitting: SURGERY
Payer: MEDICARE

## 2023-04-14 PROBLEM — I65.21 CAROTID STENOSIS, ASYMPTOMATIC, RIGHT: Status: ACTIVE | Noted: 2023-04-14

## 2023-04-14 LAB
ACT BLD: 317 SECS (ref 79–138)
GLUCOSE BLD STRIP.AUTO-MCNC: 136 MG/DL (ref 65–117)
GLUCOSE BLD STRIP.AUTO-MCNC: 139 MG/DL (ref 65–117)
SERVICE CMNT-IMP: ABNORMAL
SERVICE CMNT-IMP: ABNORMAL

## 2023-04-14 PROCEDURE — C1892 INTRO/SHEATH,FIXED,PEEL-AWAY: HCPCS | Performed by: SURGERY

## 2023-04-14 PROCEDURE — 74011000250 HC RX REV CODE- 250: Performed by: SURGERY

## 2023-04-14 PROCEDURE — 76210000017 HC OR PH I REC 1.5 TO 2 HR: Performed by: SURGERY

## 2023-04-14 PROCEDURE — 74011250636 HC RX REV CODE- 250/636: Performed by: SURGERY

## 2023-04-14 PROCEDURE — 74011250637 HC RX REV CODE- 250/637: Performed by: ANESTHESIOLOGY

## 2023-04-14 PROCEDURE — C1725 CATH, TRANSLUMIN NON-LASER: HCPCS | Performed by: SURGERY

## 2023-04-14 PROCEDURE — C1894 INTRO/SHEATH, NON-LASER: HCPCS | Performed by: SURGERY

## 2023-04-14 PROCEDURE — 77030010507 HC ADH SKN DERMBND J&J -B: Performed by: SURGERY

## 2023-04-14 PROCEDURE — 82962 GLUCOSE BLOOD TEST: CPT

## 2023-04-14 PROCEDURE — 74011250636 HC RX REV CODE- 250/636: Performed by: ANESTHESIOLOGY

## 2023-04-14 PROCEDURE — 85347 COAGULATION TIME ACTIVATED: CPT

## 2023-04-14 PROCEDURE — 77030002996 HC SUT SLK J&J -A: Performed by: SURGERY

## 2023-04-14 PROCEDURE — 65610000006 HC RM INTENSIVE CARE

## 2023-04-14 PROCEDURE — 77030002986 HC SUT PROL J&J -A: Performed by: SURGERY

## 2023-04-14 PROCEDURE — 77030002933 HC SUT MCRYL J&J -A: Performed by: SURGERY

## 2023-04-14 PROCEDURE — 74011250637 HC RX REV CODE- 250/637: Performed by: SURGERY

## 2023-04-14 PROCEDURE — 74011000636 HC RX REV CODE- 636: Performed by: SURGERY

## 2023-04-14 PROCEDURE — 77030031139 HC SUT VCRL2 J&J -A: Performed by: SURGERY

## 2023-04-14 PROCEDURE — 77030040922 HC BLNKT HYPOTHRM STRY -A

## 2023-04-14 PROCEDURE — 2709999900 HC NON-CHARGEABLE SUPPLY: Performed by: SURGERY

## 2023-04-14 PROCEDURE — 77030005402 HC CATH RAD ART LN KT TELE -B

## 2023-04-14 PROCEDURE — 77030013060 HC DEV INFL PRSS MRTM -B: Performed by: SURGERY

## 2023-04-14 PROCEDURE — 77030014008 HC SPNG HEMSTAT J&J -C: Performed by: SURGERY

## 2023-04-14 PROCEDURE — 76060000035 HC ANESTHESIA 2 TO 2.5 HR: Performed by: SURGERY

## 2023-04-14 PROCEDURE — 76010000131 HC OR TIME 2 TO 2.5 HR: Performed by: SURGERY

## 2023-04-14 PROCEDURE — C1876 STENT, NON-COA/NON-COV W/DEL: HCPCS | Performed by: SURGERY

## 2023-04-14 PROCEDURE — 037H3DZ DILATION OF RIGHT COMMON CAROTID ARTERY WITH INTRALUMINAL DEVICE, PERCUTANEOUS APPROACH: ICD-10-PCS | Performed by: SURGERY

## 2023-04-14 PROCEDURE — 77030013797 HC KT TRNSDUC PRSSR EDWD -A

## 2023-04-14 PROCEDURE — X2AH336 CEREBRAL EMBOLIC FILTRATION, EXTRACORPOREAL FLOW REVERSAL CIRCUIT FROM RIGHT COMMON CAROTID ARTERY, PERCUTANEOUS APPROACH, NEW TECHNOLOGY GROUP 6: ICD-10-PCS | Performed by: SURGERY

## 2023-04-14 DEVICE — 8 MM X 40 MM
Type: IMPLANTABLE DEVICE | Site: CAROTID | Status: FUNCTIONAL
Brand: ENROUTE TRANSCAROTID STENT

## 2023-04-14 RX ORDER — HYDROCODONE BITARTRATE AND ACETAMINOPHEN 5; 325 MG/1; MG/1
1 TABLET ORAL AS NEEDED
Status: DISCONTINUED | OUTPATIENT
Start: 2023-04-14 | End: 2023-04-14 | Stop reason: HOSPADM

## 2023-04-14 RX ORDER — ALBUTEROL SULFATE 0.83 MG/ML
2.5 SOLUTION RESPIRATORY (INHALATION) AS NEEDED
Status: DISCONTINUED | OUTPATIENT
Start: 2023-04-14 | End: 2023-04-14 | Stop reason: HOSPADM

## 2023-04-14 RX ORDER — CLOPIDOGREL BISULFATE 75 MG/1
TABLET ORAL
COMMUNITY
Start: 2023-04-11

## 2023-04-14 RX ORDER — LISINOPRIL 20 MG/1
20 TABLET ORAL EVERY MORNING
Status: DISCONTINUED | OUTPATIENT
Start: 2023-04-15 | End: 2023-04-15 | Stop reason: HOSPADM

## 2023-04-14 RX ORDER — SODIUM CHLORIDE 9 MG/ML
125 INJECTION, SOLUTION INTRAVENOUS CONTINUOUS
Status: DISPENSED | OUTPATIENT
Start: 2023-04-14 | End: 2023-04-14

## 2023-04-14 RX ORDER — HYDROMORPHONE HYDROCHLORIDE 1 MG/ML
0.2 INJECTION, SOLUTION INTRAMUSCULAR; INTRAVENOUS; SUBCUTANEOUS
Status: DISCONTINUED | OUTPATIENT
Start: 2023-04-14 | End: 2023-04-14 | Stop reason: HOSPADM

## 2023-04-14 RX ORDER — ATORVASTATIN CALCIUM 40 MG/1
80 TABLET, FILM COATED ORAL
Status: DISCONTINUED | OUTPATIENT
Start: 2023-04-14 | End: 2023-04-15 | Stop reason: HOSPADM

## 2023-04-14 RX ORDER — SODIUM CHLORIDE 0.9 % (FLUSH) 0.9 %
5-40 SYRINGE (ML) INJECTION EVERY 8 HOURS
Status: DISCONTINUED | OUTPATIENT
Start: 2023-04-14 | End: 2023-04-15 | Stop reason: HOSPADM

## 2023-04-14 RX ORDER — MORPHINE SULFATE 2 MG/ML
2 INJECTION, SOLUTION INTRAMUSCULAR; INTRAVENOUS
Status: DISCONTINUED | OUTPATIENT
Start: 2023-04-14 | End: 2023-04-14 | Stop reason: HOSPADM

## 2023-04-14 RX ORDER — AMLODIPINE BESYLATE 5 MG/1
5 TABLET ORAL EVERY MORNING
Status: DISCONTINUED | OUTPATIENT
Start: 2023-04-15 | End: 2023-04-15 | Stop reason: HOSPADM

## 2023-04-14 RX ORDER — MIDAZOLAM HYDROCHLORIDE 1 MG/ML
1 INJECTION, SOLUTION INTRAMUSCULAR; INTRAVENOUS AS NEEDED
Status: DISCONTINUED | OUTPATIENT
Start: 2023-04-14 | End: 2023-04-14 | Stop reason: HOSPADM

## 2023-04-14 RX ORDER — GUAIFENESIN 100 MG/5ML
81 LIQUID (ML) ORAL
Status: COMPLETED | OUTPATIENT
Start: 2023-04-14 | End: 2023-04-14

## 2023-04-14 RX ORDER — MIDAZOLAM HYDROCHLORIDE 1 MG/ML
0.5 INJECTION, SOLUTION INTRAMUSCULAR; INTRAVENOUS
Status: DISCONTINUED | OUTPATIENT
Start: 2023-04-14 | End: 2023-04-14 | Stop reason: HOSPADM

## 2023-04-14 RX ORDER — ENOXAPARIN SODIUM 100 MG/ML
40 INJECTION SUBCUTANEOUS EVERY 24 HOURS
Status: DISCONTINUED | OUTPATIENT
Start: 2023-04-15 | End: 2023-04-14

## 2023-04-14 RX ORDER — ONDANSETRON 2 MG/ML
4 INJECTION INTRAMUSCULAR; INTRAVENOUS AS NEEDED
Status: DISCONTINUED | OUTPATIENT
Start: 2023-04-14 | End: 2023-04-14 | Stop reason: HOSPADM

## 2023-04-14 RX ORDER — ACETAMINOPHEN 325 MG/1
650 TABLET ORAL ONCE
Status: COMPLETED | OUTPATIENT
Start: 2023-04-14 | End: 2023-04-14

## 2023-04-14 RX ORDER — GLYCOPYRROLATE 0.2 MG/ML
0.2 INJECTION INTRAMUSCULAR; INTRAVENOUS
Status: DISCONTINUED | OUTPATIENT
Start: 2023-04-14 | End: 2023-04-14 | Stop reason: HOSPADM

## 2023-04-14 RX ORDER — FENTANYL CITRATE 50 UG/ML
25 INJECTION, SOLUTION INTRAMUSCULAR; INTRAVENOUS
Status: DISCONTINUED | OUTPATIENT
Start: 2023-04-14 | End: 2023-04-14 | Stop reason: HOSPADM

## 2023-04-14 RX ORDER — SODIUM CHLORIDE, SODIUM LACTATE, POTASSIUM CHLORIDE, CALCIUM CHLORIDE 600; 310; 30; 20 MG/100ML; MG/100ML; MG/100ML; MG/100ML
1000 INJECTION, SOLUTION INTRAVENOUS CONTINUOUS
Status: DISCONTINUED | OUTPATIENT
Start: 2023-04-14 | End: 2023-04-14 | Stop reason: HOSPADM

## 2023-04-14 RX ORDER — SODIUM CHLORIDE 9 MG/ML
25 INJECTION, SOLUTION INTRAVENOUS CONTINUOUS
Status: DISCONTINUED | OUTPATIENT
Start: 2023-04-14 | End: 2023-04-14 | Stop reason: HOSPADM

## 2023-04-14 RX ORDER — ONDANSETRON 2 MG/ML
4 INJECTION INTRAMUSCULAR; INTRAVENOUS
Status: DISCONTINUED | OUTPATIENT
Start: 2023-04-14 | End: 2023-04-15 | Stop reason: HOSPADM

## 2023-04-14 RX ORDER — FENTANYL CITRATE 50 UG/ML
50 INJECTION, SOLUTION INTRAMUSCULAR; INTRAVENOUS AS NEEDED
Status: DISCONTINUED | OUTPATIENT
Start: 2023-04-14 | End: 2023-04-14 | Stop reason: HOSPADM

## 2023-04-14 RX ORDER — LIDOCAINE HYDROCHLORIDE 10 MG/ML
0.1 INJECTION, SOLUTION EPIDURAL; INFILTRATION; INTRACAUDAL; PERINEURAL AS NEEDED
Status: DISCONTINUED | OUTPATIENT
Start: 2023-04-14 | End: 2023-04-14 | Stop reason: HOSPADM

## 2023-04-14 RX ORDER — DONEPEZIL HYDROCHLORIDE 5 MG/1
10 TABLET, FILM COATED ORAL
Status: DISCONTINUED | OUTPATIENT
Start: 2023-04-14 | End: 2023-04-15 | Stop reason: HOSPADM

## 2023-04-14 RX ORDER — CLOPIDOGREL BISULFATE 75 MG/1
75 TABLET ORAL ONCE
Status: COMPLETED | OUTPATIENT
Start: 2023-04-14 | End: 2023-04-14

## 2023-04-14 RX ORDER — ROPIVACAINE HYDROCHLORIDE 5 MG/ML
30 INJECTION, SOLUTION EPIDURAL; INFILTRATION; PERINEURAL AS NEEDED
Status: DISCONTINUED | OUTPATIENT
Start: 2023-04-14 | End: 2023-04-14 | Stop reason: HOSPADM

## 2023-04-14 RX ORDER — SODIUM CHLORIDE 0.9 % (FLUSH) 0.9 %
5-40 SYRINGE (ML) INJECTION AS NEEDED
Status: DISCONTINUED | OUTPATIENT
Start: 2023-04-14 | End: 2023-04-15 | Stop reason: HOSPADM

## 2023-04-14 RX ORDER — TETRAHYDROZOLINE HCL 0.05 %
1 DROPS OPHTHALMIC (EYE)
Status: DISCONTINUED | OUTPATIENT
Start: 2023-04-14 | End: 2023-04-15 | Stop reason: HOSPADM

## 2023-04-14 RX ORDER — DIPHENHYDRAMINE HYDROCHLORIDE 50 MG/ML
12.5 INJECTION, SOLUTION INTRAMUSCULAR; INTRAVENOUS AS NEEDED
Status: DISCONTINUED | OUTPATIENT
Start: 2023-04-14 | End: 2023-04-14 | Stop reason: HOSPADM

## 2023-04-14 RX ORDER — CLOPIDOGREL BISULFATE 75 MG/1
75 TABLET ORAL DAILY
Status: DISCONTINUED | OUTPATIENT
Start: 2023-04-15 | End: 2023-04-15 | Stop reason: HOSPADM

## 2023-04-14 RX ADMIN — SODIUM CHLORIDE, PRESERVATIVE FREE 10 ML: 5 INJECTION INTRAVENOUS at 21:15

## 2023-04-14 RX ADMIN — SODIUM CHLORIDE 125 ML/HR: 9 INJECTION, SOLUTION INTRAVENOUS at 16:01

## 2023-04-14 RX ADMIN — SODIUM CHLORIDE 250 ML: 9 INJECTION, SOLUTION INTRAVENOUS at 14:39

## 2023-04-14 RX ADMIN — ACETAMINOPHEN 650 MG: 325 TABLET ORAL at 07:19

## 2023-04-14 RX ADMIN — ATORVASTATIN CALCIUM 80 MG: 40 TABLET, FILM COATED ORAL at 21:15

## 2023-04-14 RX ADMIN — MIDAZOLAM 2 MG: 1 INJECTION INTRAMUSCULAR; INTRAVENOUS at 07:26

## 2023-04-14 RX ADMIN — CLOPIDOGREL BISULFATE 75 MG: 75 TABLET ORAL at 11:26

## 2023-04-14 RX ADMIN — DONEPEZIL HYDROCHLORIDE 10 MG: 5 TABLET, FILM COATED ORAL at 21:15

## 2023-04-14 RX ADMIN — ASPIRIN 81 MG CHEWABLE TABLET 81 MG: 81 TABLET CHEWABLE at 11:26

## 2023-04-14 RX ADMIN — SODIUM CHLORIDE, PRESERVATIVE FREE 10 ML: 5 INJECTION INTRAVENOUS at 14:40

## 2023-04-15 VITALS
OXYGEN SATURATION: 100 % | BODY MASS INDEX: 27.61 KG/M2 | HEIGHT: 60 IN | HEART RATE: 48 BPM | RESPIRATION RATE: 17 BRPM | SYSTOLIC BLOOD PRESSURE: 118 MMHG | WEIGHT: 140.6 LBS | DIASTOLIC BLOOD PRESSURE: 38 MMHG | TEMPERATURE: 97.8 F

## 2023-04-15 LAB
ANION GAP SERPL CALC-SCNC: 5 MMOL/L (ref 5–15)
BASOPHILS # BLD: 0 K/UL (ref 0–0.1)
BASOPHILS NFR BLD: 0 % (ref 0–1)
BUN SERPL-MCNC: 17 MG/DL (ref 6–20)
BUN/CREAT SERPL: 21 (ref 12–20)
CALCIUM SERPL-MCNC: 8.4 MG/DL (ref 8.5–10.1)
CHLORIDE SERPL-SCNC: 115 MMOL/L (ref 97–108)
CO2 SERPL-SCNC: 20 MMOL/L (ref 21–32)
CREAT SERPL-MCNC: 0.8 MG/DL (ref 0.55–1.02)
DIFFERENTIAL METHOD BLD: ABNORMAL
EOSINOPHIL # BLD: 0 K/UL (ref 0–0.4)
EOSINOPHIL NFR BLD: 0 % (ref 0–7)
ERYTHROCYTE [DISTWIDTH] IN BLOOD BY AUTOMATED COUNT: 12.4 % (ref 11.5–14.5)
GLUCOSE SERPL-MCNC: 99 MG/DL (ref 65–100)
HCT VFR BLD AUTO: 28.2 % (ref 35–47)
HGB BLD-MCNC: 9.4 G/DL (ref 11.5–16)
IMM GRANULOCYTES # BLD AUTO: 0 K/UL (ref 0–0.04)
IMM GRANULOCYTES NFR BLD AUTO: 0 % (ref 0–0.5)
LYMPHOCYTES # BLD: 1.5 K/UL (ref 0.8–3.5)
LYMPHOCYTES NFR BLD: 16 % (ref 12–49)
MCH RBC QN AUTO: 32.8 PG (ref 26–34)
MCHC RBC AUTO-ENTMCNC: 33.3 G/DL (ref 30–36.5)
MCV RBC AUTO: 98.3 FL (ref 80–99)
MONOCYTES # BLD: 0.5 K/UL (ref 0–1)
MONOCYTES NFR BLD: 5 % (ref 5–13)
NEUTS SEG # BLD: 7.5 K/UL (ref 1.8–8)
NEUTS SEG NFR BLD: 79 % (ref 32–75)
NRBC # BLD: 0 K/UL (ref 0–0.01)
NRBC BLD-RTO: 0 PER 100 WBC
PLATELET # BLD AUTO: 162 K/UL (ref 150–400)
PMV BLD AUTO: 11.2 FL (ref 8.9–12.9)
POTASSIUM SERPL-SCNC: 4.1 MMOL/L (ref 3.5–5.1)
RBC # BLD AUTO: 2.87 M/UL (ref 3.8–5.2)
SODIUM SERPL-SCNC: 140 MMOL/L (ref 136–145)
WBC # BLD AUTO: 9.6 K/UL (ref 3.6–11)

## 2023-04-15 PROCEDURE — 74011250637 HC RX REV CODE- 250/637: Performed by: SURGERY

## 2023-04-15 PROCEDURE — 85025 COMPLETE CBC W/AUTO DIFF WBC: CPT

## 2023-04-15 PROCEDURE — 51798 US URINE CAPACITY MEASURE: CPT

## 2023-04-15 PROCEDURE — 80048 BASIC METABOLIC PNL TOTAL CA: CPT

## 2023-04-15 PROCEDURE — 36415 COLL VENOUS BLD VENIPUNCTURE: CPT

## 2023-04-15 RX ADMIN — APIXABAN 5 MG: 2.5 TABLET, FILM COATED ORAL at 08:00

## 2023-04-15 RX ADMIN — CLOPIDOGREL BISULFATE 75 MG: 75 TABLET ORAL at 08:00

## 2023-04-17 ENCOUNTER — PATIENT OUTREACH (OUTPATIENT)
Dept: CASE MANAGEMENT | Age: 83
End: 2023-04-17

## 2023-04-17 NOTE — PROGRESS NOTES
Care Transitions Initial Call    Call within 2 business days of discharge: Yes     Patient Current Location: [place]    Patient: Elli Whittaker Patient : 1940 MRN: [id number]    Last Discharge  Street       Date Complaint Diagnosis Description Type Department Provider    23   Admission (Discharged) Stephen Santiago MD          Was this an external facility discharge? No     Challenges to be reviewed by the provider   Additional needs identified to be addressed with provider: no  none, the daughter has no red flags to report at this time. Method of communication with provider:  chart routing to PCP. Component      Latest Ref Rng & Units 4/15/2023 2023           3:46 AM 11:19 AM   RBC      3.80 - 5.20 M/uL 2.87 (L) 3.78 (L)   HGB      11.5 - 16.0 g/dL 9.4 (L) 12.3   HCT      35.0 - 47.0 % 28.2 (L) 38.3     Component      Latest Ref Rng & Units 4/15/2023 2023           3:46 AM 11:19 AM   NEUTROPHILS      32 - 75 % 79 (H) 72     Component      Latest Ref Rng & Units 4/15/2023 2023           3:46 AM 11:19 AM   Chloride      97 - 108 mmol/L 115 (H) 110 (H)   CO2      21 - 32 mmol/L 20 (L) 28     Component      Latest Ref Rng & Units 4/15/2023 2023           3:46 AM 11:19 AM   BUN/Creatinine ratio      12 - 20   21 (H) 19     Component      Latest Ref Rng & Units 4/15/2023 2023           3:46 AM 11:19 AM   Calcium      8.5 - 10.1 MG/DL 8.4 (L) 9.4     Component      Latest Ref Rng & Units 2023           8:30 AM   Activated Clotting Time (POC)      79 - 138 SECS 317 (H)     COVID-19 related testing was not completed during this admission. Advance Care Planning:   Does patient have an Advance Directive: yes, reviewed and current per patient's daughter, Eugene Painter (on [address] dated 23 and 23). Inpatient Readmission Risk score: Unplanned Readmit Risk Score: 10.7    Was this a readmission?  no   Patient stated reason for the admission: N/A, telephonic assessment completed with patient's daughter, Sumanth Echeverria (on [address] dated 23 and 23). Patients top risk factors for readmission:  Medical condition - Symptomatic stenosis of left carotid artery, asymptomatic right carotid stenosis, and s/p right transcarotid artery revascularization per chart. Interventions to address risk factors: Obtained and reviewed discharge summary and/or continuity of care documents and Education of patient/family/caregiver/guardian to support self-management-Education provided regarding signs/symptoms of pain and inflammation, the daughter verbalized an understanding. Care Transition Nurse (CTN) contacted the  patient's daughter, Sumanth Echeverria (on [address] dated 23 and 23),  by telephone to perform post hospital discharge assessment. Verified name and  with  the daughter  as identifiers. Provided introduction to self, and explanation of the CTN role. CTN reviewed discharge instructions, medical action plan and red flags with  the daughter  who verbalized understanding. Were discharge instructions available to patient/daughter? yes. Reviewed appropriate site of care based on symptoms and resources available to patient including: PCP, Specialist, Urgent Care Clinics, When to call 911, and [facility] . The daughter was  given an opportunity to ask questions and does not have any further questions or concerns at this time. The  daughter  agrees to contact the PCP office for questions related to patient's healthcare. Medication reconciliation was performed with  the daughter , who verbalizes understanding of administration of home medications. Advised obtaining a 90-day supply of all daily and as-needed medications.    Referral to Pharm D needed: no     Home Health/Outpatient orders at discharge: [address]: n/a  Date of initial visit: [address] ordered at discharge: None  [address]: n/a  Durable Medical Equipment received: n/a    Was patient discharged with a pulse oximeter? no    Discussed follow-up appointments. If no appointment was previously scheduled, appointment scheduling offered: yes. Is follow up appointment scheduled within 7 days of discharge? The daughter states she will call patient's PCP office today to schedule her PAIGE appointment. Daughter states patient has appointment with Dr. Pamela Mckinnon on 5-3-23. [facility] follow up appointment(s):   Future Appointments   Date Time Provider Dany Michaud   5/22/2023  3:40 PM [facility]     [facility] follow up appointment(s): Dr. Pamela Mckinnon on 5-3-23 per the daughter. Plan for follow-up call in 7-10 days based on severity of symptoms and risk factors. Plan for next call: self management-Review red flags of pain and inflammation, and follow up appointment-Evaluate if patient is attending follow-up appointments as scheduled. CTN provided contact information for future needs. Goals        Understands red flags post discharge. 4-17-23: Red flags of pain and inflammation reviewed with patient's daughter, Sugar Sena (on [address] [place] [address] dated 2-1-23 and 2-28-23), and the daughter verbalized an understanding. The daughter denies patient having chest pain, denies shortness of breath, denies fever/chills, and denies nausea/vomiting since discharge on 4-15-23. Daughter states patient has a dressing to the right side of her neck, states dressing is dry and clean, no dressing on drainage at this time, and states she plans to remove dressing later today. Daughter states patient is utilizing a regular diet at home, appetite is fair. Denies patient having any falls in the last 12 months. The daughter has no red flags to report at this time. Care Transitions Nurse will review red flags again on next phone conversation with patient.  Mar Sands

## 2023-04-17 NOTE — ACP (ADVANCE CARE PLANNING)
Patient's daughter, Pacheco Dyer (on [address] dated 2-1-23 and 2-28-23), states patient's [address] [place] [address] dated 10- is a current ACP document. Patient's [address] [place] [address] document dated 10- is currently scanned into her chart.

## 2023-04-17 NOTE — DISCHARGE SUMMARY
Discharge Summary     Patient: Jessica Martínez MRN: [id number]  SSN: [id number]    YOB: 1940  Age: 81 y.o. Sex: female       Admit Date: 4/14/2023    Discharge Date: 4/17/2023      Admission Diagnoses: Carotid stenosis, asymptomatic, right [I65.21]    Discharge Diagnoses:   Problem List as of 4/15/2023 Date Reviewed: 3/17/2023            Codes Class Noted - Resolved    Alzheimer's disease, unspecified ICD-10-CM: G30.9  ICD-9-CM: 331.0  3/17/2023 - Present        Syncope and collapse ICD-10-CM: R55  ICD-9-CM: 780.2  1/9/2023 - Present        Carotid stenosis, asymptomatic, right ICD-10-CM: I65.21  ICD-9-CM: 433.10  4/14/2023 - Present        Symptomatic stenosis of left carotid artery ICD-10-CM: I65.22  ICD-9-CM: 433.10  12/30/2022 - Present            Discharge Condition: Good    Hospital Course: Uncomplicated R TCAR. Brief course of camacho. Discharged on POD1 feeling well neuro intact. Disposition: home    Discharge Medications:   Cannot display discharge medications since this patient is not currently admitted. Activity: Activity as tolerated  Diet: Regular Diet  Wound Care: Wash all incisiosn    Follow-up Appointments   Procedures    FOLLOW UP VISIT Appointment in: Two Weeks     Standing Status:   Standing     Number of Occurrences:   1     Order Specific Question:   Appointment in     Answer:    Two Weeks       Signed By: Zaynab Sahni MD     April 17, 2023

## 2023-04-28 ENCOUNTER — PATIENT OUTREACH (OUTPATIENT)
Dept: CASE MANAGEMENT | Age: 83
End: 2023-04-28

## 2023-04-28 NOTE — PROGRESS NOTES
Care Transitions Outreach Attempt    Call within 2 business days of discharge: Yes   Attempted to reach patient/family by phone for transitions of care follow up, unable to reach patient/family. Patient: Bo Jennings Patient : 1940 MRN: [id number]    Last Discharge [address]       Date Complaint Diagnosis Description Type Department Provider    23   Admission (Discharged) Juanita Flynn MD          Was this an external facility discharge? No     Noted following upcoming appointments from discharge chart review:   [facility] follow up appointment(s):   Future Appointments   Date Time Provider Dany Michaud   2023  3:40 PM MD RAVI Pedro[facility]     [facility] follow up appointment(s): Dr. Stacey Sanchez/vascular surgery on 5/3/23 per patient's daughter, Toya Radford, during our last phone conversation 23. Goals        Understands red flags post discharge. 23: Red flags of pain and inflammation reviewed with patient's daughter, Toya Radford (on [address] dated 23 and 23), and the daughter verbalized an understanding. The daughter denies patient having chest pain, denies shortness of breath, denies fever/chills, and denies nausea/vomiting since discharge on 4-15-23. Daughter states patient has a dressing to the right side of her neck, states dressing is dry and clean, no dressing on drainage at this time, and states she plans to remove dressing later today. Daughter states patient is utilizing a regular diet at home, appetite is fair. Denies patient having any falls in the last 12 months. The daughter has no red flags to report at this time. Care Transitions Nurse will review red flags again on next phone conversation with patient. Retia Bend     23: Unable to reach patient/daughter for transitions of care follow-up. The daughter, Toya Radford (on [address] forms dated 23 and 23), has the same phone number listed in chart as the patient's phone number. Francine Bender

## 2023-05-03 DIAGNOSIS — I48.91 ATRIAL FIBRILLATION, UNSPECIFIED TYPE (HCC): ICD-10-CM

## 2023-05-03 RX ORDER — APIXABAN 5 MG/1
TABLET, FILM COATED ORAL
Qty: 180 TABLET | Refills: 1 | Status: SHIPPED | OUTPATIENT
Start: 2023-05-03

## 2023-05-07 ENCOUNTER — HOSPITAL ENCOUNTER (EMERGENCY)
Facility: HOSPITAL | Age: 83
Discharge: HOME | End: 2023-05-08
Attending: STUDENT IN AN ORGANIZED HEALTH CARE EDUCATION/TRAINING PROGRAM
Payer: MEDICARE

## 2023-05-07 DIAGNOSIS — R55 NEAR SYNCOPE: Primary | ICD-10-CM

## 2023-05-07 LAB
ALBUMIN SERPL-MCNC: 3.4 G/DL (ref 3.5–5)
ALBUMIN/GLOB SERPL: 1 (ref 1.1–2.2)
ALP SERPL-CCNC: 100 U/L (ref 45–117)
ALT SERPL-CCNC: 36 U/L (ref 12–78)
ANION GAP SERPL CALC-SCNC: 3 MMOL/L (ref 5–15)
AST SERPL-CCNC: 31 U/L (ref 15–37)
BILIRUB SERPL-MCNC: 0.6 MG/DL (ref 0.2–1)
BUN SERPL-MCNC: 17 MG/DL (ref 6–20)
BUN/CREAT SERPL: 19 (ref 12–20)
CALCIUM SERPL-MCNC: 9.2 MG/DL (ref 8.5–10.1)
CHLORIDE SERPL-SCNC: 114 MMOL/L (ref 97–108)
CO2 SERPL-SCNC: 23 MMOL/L (ref 21–32)
COMMENT:: NORMAL
COMMENT:: NORMAL
CREAT SERPL-MCNC: 0.9 MG/DL (ref 0.55–1.02)
ERYTHROCYTE [DISTWIDTH] IN BLOOD BY AUTOMATED COUNT: 12.6 % (ref 11.5–14.5)
GLOBULIN SER CALC-MCNC: 3.4 G/DL (ref 2–4)
GLUCOSE SERPL-MCNC: 124 MG/DL (ref 65–100)
HCT VFR BLD AUTO: 34.7 % (ref 35–47)
HGB BLD-MCNC: 11.2 G/DL (ref 11.5–16)
MCH RBC QN AUTO: 31.6 PG (ref 26–34)
MCHC RBC AUTO-ENTMCNC: 32.3 G/DL (ref 30–36.5)
MCV RBC AUTO: 98 FL (ref 80–99)
NRBC # BLD: 0 K/UL (ref 0–0.01)
NRBC BLD-RTO: 0 PER 100 WBC
PLATELET # BLD AUTO: 174 K/UL (ref 150–400)
PMV BLD AUTO: 10.8 FL (ref 8.9–12.9)
POTASSIUM SERPL-SCNC: 3.5 MMOL/L (ref 3.5–5.1)
PROT SERPL-MCNC: 6.8 G/DL (ref 6.4–8.2)
RBC # BLD AUTO: 3.54 M/UL (ref 3.8–5.2)
SODIUM SERPL-SCNC: 140 MMOL/L (ref 136–145)
SPECIMEN HOLD: NORMAL
SPECIMEN HOLD: NORMAL
TROPONIN I SERPL HS-MCNC: 3 NG/L (ref 0–37)
TROPONIN I SERPL HS-MCNC: 4 NG/L (ref 0–37)
WBC # BLD AUTO: 8.4 K/UL (ref 3.6–11)

## 2023-05-07 PROCEDURE — 36415 COLL VENOUS BLD VENIPUNCTURE: CPT

## 2023-05-07 PROCEDURE — 93005 ELECTROCARDIOGRAM TRACING: CPT | Performed by: STUDENT IN AN ORGANIZED HEALTH CARE EDUCATION/TRAINING PROGRAM

## 2023-05-07 PROCEDURE — 80053 COMPREHEN METABOLIC PANEL: CPT

## 2023-05-07 PROCEDURE — 84484 ASSAY OF TROPONIN QUANT: CPT

## 2023-05-07 PROCEDURE — 85027 COMPLETE CBC AUTOMATED: CPT

## 2023-05-07 PROCEDURE — 99284 EMERGENCY DEPT VISIT MOD MDM: CPT

## 2023-05-08 ENCOUNTER — TELEPHONE (OUTPATIENT)
Age: 83
End: 2023-05-08

## 2023-05-08 VITALS
SYSTOLIC BLOOD PRESSURE: 168 MMHG | OXYGEN SATURATION: 98 % | DIASTOLIC BLOOD PRESSURE: 77 MMHG | RESPIRATION RATE: 18 BRPM | HEART RATE: 55 BPM | TEMPERATURE: 98.3 F

## 2023-05-08 LAB
EKG ATRIAL RATE: 53 BPM
EKG DIAGNOSIS: NORMAL
EKG P AXIS: 70 DEGREES
EKG P-R INTERVAL: 194 MS
EKG Q-T INTERVAL: 444 MS
EKG QRS DURATION: 108 MS
EKG QTC CALCULATION (BAZETT): 416 MS
EKG R AXIS: 40 DEGREES
EKG T AXIS: 19 DEGREES
EKG VENTRICULAR RATE: 53 BPM

## 2023-05-08 PROCEDURE — 93010 ELECTROCARDIOGRAM REPORT: CPT | Performed by: INTERNAL MEDICINE

## 2023-05-08 ASSESSMENT — PAIN DESCRIPTION - LOCATION: LOCATION: HEAD

## 2023-05-08 ASSESSMENT — PAIN - FUNCTIONAL ASSESSMENT: PAIN_FUNCTIONAL_ASSESSMENT: 0-10

## 2023-05-08 ASSESSMENT — PAIN SCALES - GENERAL: PAINLEVEL_OUTOF10: 1

## 2023-05-08 ASSESSMENT — PAIN DESCRIPTION - DESCRIPTORS: DESCRIPTORS: DISCOMFORT

## 2023-05-08 NOTE — TELEPHONE ENCOUNTER
Spoke to patient's daughter, Shantanu Corona. Name noted on permission to release information. Identifiers x 2. States patient taken to ER last night due to hypotension, unresponsiveness, sweating. Has follow up with Dr. Samir Caldwell on 5-22-23. Was told by ER MD to try to be seen earlier.   To discuss with MD.

## 2023-05-09 RX ORDER — CLOPIDOGREL BISULFATE 75 MG/1
TABLET ORAL
COMMUNITY
Start: 2023-04-11

## 2023-05-11 ENCOUNTER — OFFICE VISIT (OUTPATIENT)
Age: 83
End: 2023-05-11
Payer: MEDICARE

## 2023-05-11 VITALS
HEIGHT: 60 IN | RESPIRATION RATE: 16 BRPM | WEIGHT: 137 LBS | BODY MASS INDEX: 26.9 KG/M2 | OXYGEN SATURATION: 99 % | SYSTOLIC BLOOD PRESSURE: 142 MMHG | HEART RATE: 65 BPM | DIASTOLIC BLOOD PRESSURE: 48 MMHG

## 2023-05-11 DIAGNOSIS — I65.23 OCCLUSION AND STENOSIS OF BILATERAL CAROTID ARTERIES: ICD-10-CM

## 2023-05-11 DIAGNOSIS — R07.9 CHEST PAIN AT REST: Primary | ICD-10-CM

## 2023-05-11 DIAGNOSIS — I48.0 PAROXYSMAL ATRIAL FIBRILLATION (HCC): ICD-10-CM

## 2023-05-11 DIAGNOSIS — I10 PRIMARY HYPERTENSION: ICD-10-CM

## 2023-05-11 DIAGNOSIS — Z79.01 LONG TERM (CURRENT) USE OF ANTICOAGULANTS: ICD-10-CM

## 2023-05-11 PROCEDURE — 1123F ACP DISCUSS/DSCN MKR DOCD: CPT | Performed by: SPECIALIST

## 2023-05-11 PROCEDURE — 3077F SYST BP >= 140 MM HG: CPT | Performed by: SPECIALIST

## 2023-05-11 PROCEDURE — 3078F DIAST BP <80 MM HG: CPT | Performed by: SPECIALIST

## 2023-05-11 PROCEDURE — 99214 OFFICE O/P EST MOD 30 MIN: CPT | Performed by: SPECIALIST

## 2023-05-11 PROCEDURE — G8400 PT W/DXA NO RESULTS DOC: HCPCS | Performed by: SPECIALIST

## 2023-05-11 PROCEDURE — 1090F PRES/ABSN URINE INCON ASSESS: CPT | Performed by: SPECIALIST

## 2023-05-11 PROCEDURE — G8427 DOCREV CUR MEDS BY ELIG CLIN: HCPCS | Performed by: SPECIALIST

## 2023-05-11 PROCEDURE — G8419 CALC BMI OUT NRM PARAM NOF/U: HCPCS | Performed by: SPECIALIST

## 2023-05-11 PROCEDURE — 1036F TOBACCO NON-USER: CPT | Performed by: SPECIALIST

## 2023-05-11 RX ORDER — SPIRONOLACTONE 25 MG/1
25 TABLET ORAL DAILY
Qty: 30 TABLET | Refills: 3 | Status: SHIPPED | OUTPATIENT
Start: 2023-05-11

## 2023-05-11 RX ORDER — DONEPEZIL HYDROCHLORIDE 10 MG/1
10 TABLET, FILM COATED ORAL NIGHTLY
COMMUNITY
Start: 2023-03-17

## 2023-05-11 ASSESSMENT — PATIENT HEALTH QUESTIONNAIRE - PHQ9
SUM OF ALL RESPONSES TO PHQ QUESTIONS 1-9: 0
SUM OF ALL RESPONSES TO PHQ9 QUESTIONS 1 & 2: 0
SUM OF ALL RESPONSES TO PHQ QUESTIONS 1-9: 0
1. LITTLE INTEREST OR PLEASURE IN DOING THINGS: 0
SUM OF ALL RESPONSES TO PHQ QUESTIONS 1-9: 0
SUM OF ALL RESPONSES TO PHQ QUESTIONS 1-9: 0
2. FEELING DOWN, DEPRESSED OR HOPELESS: 0

## 2023-05-11 NOTE — PROGRESS NOTES
are moist.   Eyes:      Extraocular Movements: Extraocular movements intact. Cardiovascular:      Rate and Rhythm: Normal rate and regular rhythm. Heart sounds: Normal heart sounds. Pulmonary:      Effort: Pulmonary effort is normal.   Abdominal:      Palpations: Abdomen is soft. Musculoskeletal:         General: Normal range of motion. Cervical back: Normal range of motion. Skin:     General: Skin is warm. Neurological:      Mental Status: She is alert. Mental status is at baseline. Psychiatric:         Behavior: Behavior normal.        ASSESSMENT and PLAN  She has a very wide pulse pressure of 140 mmHg. She has been noted to have atrial fibrillation in the past as well as bradycardia and I do think she has a tendency to run a low heart rate. I suspect that the recent carotid procedure has caused some issues with regard her blood pressure and her heart rate. Her chest pain was atypical and there was nothing pointing toward it being cardiac in nature when she was seen in the emergency room. Years ago she used to take hydrochlorothiazide but it was stopped for unclear reasons. Given the borderline low potassium I will start her on spironolactone 25 mg a day in addition to her amlodipine and her lisinopril in order to hopefully improve her systolic blood pressure. She has a follow-up scheduled with Dr. Cyndie Meza in 11 days.

## 2023-05-22 ENCOUNTER — OFFICE VISIT (OUTPATIENT)
Age: 83
End: 2023-05-22
Payer: MEDICARE

## 2023-05-22 VITALS
HEART RATE: 71 BPM | BODY MASS INDEX: 27.45 KG/M2 | OXYGEN SATURATION: 99 % | SYSTOLIC BLOOD PRESSURE: 158 MMHG | HEIGHT: 60 IN | DIASTOLIC BLOOD PRESSURE: 56 MMHG | WEIGHT: 139.8 LBS

## 2023-05-22 DIAGNOSIS — I65.23 OCCLUSION AND STENOSIS OF BILATERAL CAROTID ARTERIES: ICD-10-CM

## 2023-05-22 DIAGNOSIS — Z79.01 LONG TERM (CURRENT) USE OF ANTICOAGULANTS: ICD-10-CM

## 2023-05-22 DIAGNOSIS — I48.0 PAROXYSMAL ATRIAL FIBRILLATION (HCC): Primary | ICD-10-CM

## 2023-05-22 PROCEDURE — G8400 PT W/DXA NO RESULTS DOC: HCPCS | Performed by: INTERNAL MEDICINE

## 2023-05-22 PROCEDURE — G8427 DOCREV CUR MEDS BY ELIG CLIN: HCPCS | Performed by: INTERNAL MEDICINE

## 2023-05-22 PROCEDURE — G8419 CALC BMI OUT NRM PARAM NOF/U: HCPCS | Performed by: INTERNAL MEDICINE

## 2023-05-22 PROCEDURE — 1036F TOBACCO NON-USER: CPT | Performed by: INTERNAL MEDICINE

## 2023-05-22 PROCEDURE — 99214 OFFICE O/P EST MOD 30 MIN: CPT | Performed by: INTERNAL MEDICINE

## 2023-05-22 PROCEDURE — 1090F PRES/ABSN URINE INCON ASSESS: CPT | Performed by: INTERNAL MEDICINE

## 2023-05-22 PROCEDURE — 1123F ACP DISCUSS/DSCN MKR DOCD: CPT | Performed by: INTERNAL MEDICINE

## 2023-05-22 ASSESSMENT — PATIENT HEALTH QUESTIONNAIRE - PHQ9
SUM OF ALL RESPONSES TO PHQ QUESTIONS 1-9: 0
1. LITTLE INTEREST OR PLEASURE IN DOING THINGS: 0
SUM OF ALL RESPONSES TO PHQ9 QUESTIONS 1 & 2: 0
SUM OF ALL RESPONSES TO PHQ QUESTIONS 1-9: 0
2. FEELING DOWN, DEPRESSED OR HOPELESS: 0
SUM OF ALL RESPONSES TO PHQ QUESTIONS 1-9: 0
SUM OF ALL RESPONSES TO PHQ QUESTIONS 1-9: 0

## 2023-05-22 NOTE — PROGRESS NOTES
vagal response though. Will reach out to our EP about placing an implantable loop. 2. Chronic anticoagulation. She was on Plavix post TCAR, but is now back on Eliquis and aspirin. 3. Essential hypertension. Blood pressure is mildly elevated, at times it is controlled at 132/60 here. No changes made. She  has a past medical history of Breast CA ([facility] [address] CAD (coronary artery disease), Cancer ([facility] Dementia ([facility] Diabetes ([facility] Hypertension, Psychiatric disorder, Radiation therapy complication, and Stroke ([facility] All other systems negative except as above. PE  Vitals:    03/17/23 1506   BP: 132/60   Pulse: 83   Resp: 16   SpO2: 98%   Weight: 141 lb (64 kg)   Height: 5' (1.524 m)    Body mass index is 27.54 kg/m².   General appearance - alert, well appearing, and in no distress  Mental status - affect appropriate to mood  Eyes - sclera anicteric, moist mucous membranes  Neck - supple, no JVD  Chest - clear to auscultation, no wheezes, rales or rhonchi  Heart - irregularly irregular, normal S1, S2, no murmurs, rubs, clicks or gallops  Abdomen - soft, nontender, nondistended, no masses or organomegaly  Back exam - full range of motion, no tenderness  Neurological - no focal deficits  Extremities - peripheral pulses normal, no pedal edema      Recent Labs:  No results found for: CHOL, CHOLX, CHLST, CHOLV, 764485, HDL, HDLP, LDL, LDLC, DLDLP, TGLX, TRIGL, TRIGP, CHHD, CHHDX  Lab Results   Component Value Date/Time    Creatinine (POC) 0.8 12/06/2022 01:15 PM    Creatinine 0.65 01/10/2023 04:19 AM     Lab Results   Component Value Date/Time    BUN 11 01/10/2023 04:19 AM    BUN 15 12/06/2022 01:15 PM     Lab Results   Component Value Date/Time    Potassium 3.6 01/10/2023 04:19 AM     Lab Results   Component Value Date/Time    Hemoglobin A1c 5.7 (H) 12/21/2022 11:15 AM     Lab Results   Component Value Date/Time    Hemoglobin (POC) 12.6 02/01/2023 02:40 PM    HGB 9.6 (L) 01/10/2023 04:19 AM     Lab Results

## 2023-05-24 NOTE — ED PROVIDER NOTES
[facility] [place] EMERGENCY [address]      Pt Name: Nick Sheldon  MRN: [id number]  Ignacia 1940  Date of evaluation: 5/7/2023  Provider: Patric Casanova MD    CHIEF COMPLAINT       Chief Complaint   Patient presents with    Loss of Consciousness    Chest Pain         HISTORY OF PRESENT ILLNESS   (Location/Symptom, Timing/Onset, Context/Setting, Quality, Duration, Modifying Factors, Severity)  Note limiting factors. Patient is an 81-year-old female present emergency department after having a near syncopal episode. Per EMS patient had a syncopal episode is unclear if patient actually had a syncopal episode versus presyncope. Patient has a history of dementia family who is bedside states that patient was acting appropriate and is at her baseline. Review of External Medical Records:     Nursing Notes were reviewed. REVIEW OF SYSTEMS    (2-9 systems for level 4, 10 or more for level 5)     Review of Systems    Except as noted above the remainder of the review of systems was reviewed and negative.        PAST MEDICAL HISTORY     Past Medical History:   Diagnosis Date    Breast CA ([address] 2000    LEFT    CAD (coronary artery disease)     Cancer ([address]     Dementia ([facility] 2023    Diabetes ([facility]     PATIENT DENIES AS OF 4/5/23    Hypertension     Psychiatric disorder     Radiation therapy complication     7061    Stroke [facility]) 10/2022    NO RESIDUAL         SURGICAL HISTORY       Past Surgical History:   Procedure Laterality Date    BREAST BIOPSY Left     2008    BREAST LUMPECTOMY Left     2008    CATARACT REMOVAL Bilateral 2018    CHOLECYSTECTOMY      COLONOSCOPY      ORTHOPEDIC SURGERY      SHOULDER FRACTURE    OTHER SURGICAL HISTORY Left 12/30/2022    TRANSCAROTID ARTERY REVASCULARIZATION    PARTIAL HYSTERECTOMY (CERVIX NOT REMOVED)  1975         CURRENT MEDICATIONS       Discharge Medication List as of 5/8/2023 12:33 AM        CONTINUE these medications which have NOT CHANGED

## 2023-05-25 ENCOUNTER — CLINICAL DOCUMENTATION (OUTPATIENT)
Age: 83
End: 2023-05-25

## 2023-05-26 ENCOUNTER — TELEPHONE (OUTPATIENT)
Age: 83
End: 2023-05-26

## 2023-05-26 DIAGNOSIS — R55 SYNCOPE: Primary | ICD-10-CM

## 2023-05-26 DIAGNOSIS — I48.0 PAROXYSMAL ATRIAL FIBRILLATION (HCC): ICD-10-CM

## 2023-05-26 NOTE — TELEPHONE ENCOUNTER
----- Message from Andriy Baeza MD sent at 5/25/2023  7:20 AM EDT -----  She was in ER with syncope yesterday  Can you talk to family if they want to schedule ILR    ----- Message -----  From: Jefry Hayes MD  Sent: 5/23/2023  11:44 PM EDT  To: Andriy Baeza MD    Can you help with implantable loop for this pt?  Thx Francois

## 2023-05-26 NOTE — TELEPHONE ENCOUNTER
Verified patient with two types of identifiers. Spoke with Jay Dejesus, verified on [address]. Discussed ILR and they would like to move forward with scheduling. Due to patient and MD schedule, procedure scheduled for 8/3/23 at 12:30 pm. Will mail pre procedure instructions. Jay Dejesus verbalized understanding and will call with any other questions.       Future Appointments   Date Time Provider Renuka Vo   6/2/2023  3:15 PM Heidi Ramirez DO [facility] BS AMB   11/17/2023  3:00 PM NETTIE Mack - NP NEUMRSPB BS AMB   11/22/2023  3:40 PM MD PATRICK Smith BS AMB

## 2023-06-02 ENCOUNTER — OFFICE VISIT (OUTPATIENT)
Dept: PRIMARY CARE CLINIC | Facility: CLINIC | Age: 83
End: 2023-06-02

## 2023-06-02 VITALS
OXYGEN SATURATION: 98 % | DIASTOLIC BLOOD PRESSURE: 60 MMHG | HEIGHT: 60 IN | WEIGHT: 139 LBS | BODY MASS INDEX: 27.29 KG/M2 | SYSTOLIC BLOOD PRESSURE: 158 MMHG | HEART RATE: 62 BPM | RESPIRATION RATE: 17 BRPM

## 2023-06-02 DIAGNOSIS — I10 PRIMARY HYPERTENSION: ICD-10-CM

## 2023-06-02 DIAGNOSIS — R40.4 STARING EPISODES: ICD-10-CM

## 2023-06-02 DIAGNOSIS — Z71.89 COUNSELING REGARDING ADVANCED CARE PLANNING AND GOALS OF CARE: Primary | ICD-10-CM

## 2023-06-02 SDOH — ECONOMIC STABILITY: INCOME INSECURITY: HOW HARD IS IT FOR YOU TO PAY FOR THE VERY BASICS LIKE FOOD, HOUSING, MEDICAL CARE, AND HEATING?: PATIENT DECLINED

## 2023-06-02 SDOH — ECONOMIC STABILITY: FOOD INSECURITY: WITHIN THE PAST 12 MONTHS, YOU WORRIED THAT YOUR FOOD WOULD RUN OUT BEFORE YOU GOT MONEY TO BUY MORE.: PATIENT DECLINED

## 2023-06-02 SDOH — ECONOMIC STABILITY: HOUSING INSECURITY
IN THE LAST 12 MONTHS, WAS THERE A TIME WHEN YOU DID NOT HAVE A STEADY PLACE TO SLEEP OR SLEPT IN A SHELTER (INCLUDING NOW)?: PATIENT REFUSED

## 2023-06-02 SDOH — ECONOMIC STABILITY: FOOD INSECURITY: WITHIN THE PAST 12 MONTHS, THE FOOD YOU BOUGHT JUST DIDN'T LAST AND YOU DIDN'T HAVE MONEY TO GET MORE.: PATIENT DECLINED

## 2023-06-02 NOTE — PROGRESS NOTES
HPI     Chief Complaint   Patient presents with    Follow-Up from Hospital     She is a 81 y.o. female who presents for follow up. Was seen in ER 5/7/23 for near syncope/ chest pain after EMS was called for pain below her breast, nausea, vomiting and a \"episode of staring/ mental change. \" No urinary incontinence or tongue biting. Daughter describes it as \"the lights were out. \" EKG and labs were reassuring and she was discharge home. Followed up with Dr. Cristian Cloud 5/11/23 who noted she had a wide pulse pressure. He thinks her carotid procedure caused issues with her BP and HR. Started her on Spironolactone to help with HTN and hypoK. Daughter who is a nurse states they give it only if her BP is persistently high. Followed up with Dr. Cara Silva 5/22/23. Dr. Cara Silva wondered if the episode might be related to stroke or seizure given her stroke history or possible vagal response? They are planning to place an EP loop in August.     She has follow up with Neuro in November. Denies any symptoms today. They need to draft new POA documents since moving to [place]. Review of Systems   Respiratory:  Negative for shortness of breath. Cardiovascular:  Negative for chest pain. Neurological:  Negative for facial asymmetry and headaches. Reviewed PmHx, RxHx, FmHx, SocHx, AllgHx and updated and dated in the chart. Physical Exam:  BP (!) 158/60   Pulse 62   Resp 17   Ht 5' (1.524 m)   Wt 139 lb (63 kg)   SpO2 98%   BMI 27.15 kg/m²   Physical Exam  Vitals and nursing note reviewed. Constitutional:       General: She is not in acute distress. Appearance: Normal appearance. She is not ill-appearing. Cardiovascular:      Rate and Rhythm: Normal rate and regular rhythm. Heart sounds: Murmur heard. Pulmonary:      Effort: Pulmonary effort is normal. No respiratory distress. Breath sounds: Normal breath sounds. No wheezing, rhonchi or rales. Neurological:      General: No focal deficit present.

## 2023-06-02 NOTE — PROGRESS NOTES
Chief Complaint   Patient presents with    Follow-Up from Hospital       There were no vitals taken for this visit. 1. Have you been to the ER, urgent care clinic since your last visit? Hospitalized since your last visit? Yes [facility]    2. Have you seen or consulted any other health care providers outside of the [address] [place] [address] since your last visit? Include any pap smears or colon screening. No      3. For patients aged 39-70: Has the patient had a colonoscopy / FIT/ Cologuard? N/A      If the patient is female:    4. For patients aged 41-77: Has the patient had a mammogram within the past 2 years? NA      5. For patients aged 21-65: Has the patient had a pap smear?  NA

## 2023-06-04 ASSESSMENT — ENCOUNTER SYMPTOMS: SHORTNESS OF BREATH: 0

## 2023-06-05 ENCOUNTER — CLINICAL DOCUMENTATION (OUTPATIENT)
Dept: SPIRITUAL SERVICES | Age: 83
End: 2023-06-05

## 2023-06-05 NOTE — PROGRESS NOTES
Advance Care Planning   Ambulatory ACP Specialist Patient Outreach    Date:  6/5/2023    ACP Specialist:  Donna Harrell    Outreach call to patient in follow-up to ACP Specialist referral from:Geovanny Gavin DO    [x] PCP  [] Provider   [] Ambulatory Care Management [] Other     For:                  [x] Advance Directive Assistance              [] Complete Portable DNR order              [] Complete POST/POLST/MOST              [] Code Status Discussion             [] Discuss Goals of Care             [] Early ACP Decision-Making              [] Other (Specify)    Date Referral Received: 6/2/23    Next Step:   [x] ACP scheduled conversation  [] Outreach again in one week               [x] Email / Mail [address]  [x] Email / Mail Advance Directive   [] Closing referral.  Routing closure to referring provider/staff and to ACP Specialist . [] Closure letter mailed to patient with invitation to contact ACP Specialist if / when ready. [] Other (Specify here): Outreaches:         [x] 1st -  Date:  6/5/23               Intervention:  [] Spoke with Patient   [x] Left Voice mail [] Email / Mail    [] [employer]  [] Other [id number]) : Outcomes: Outreach phone call to the patient. Unable to reach, left voicemail message with call back information. Will attempt to follow up in one week. Received call back from patient's daughter Janay Jerome scheduling an in person conversation with ACP Specialist Hillary Tran on Thursday, 7/6/23 at 3:00 p.m.              [] 2nd -  Date:                 Intervention:  [] Spoke with Patient  [] Left Voice mail [] Email / Mail    [] [employer]  [] Other [id number]) : Outcomes:                [] 3rd -  Date:                Intervention:  [] Spoke with Patient   [] Left Voice mail [] Email / Mail    [] [employer]  [] Other [id number]) :        Outcomes:           []  Additional Outreach -  Date:     (Specify Dates & special

## 2023-06-29 ENCOUNTER — TELEPHONE (OUTPATIENT)
Dept: PRIMARY CARE CLINIC | Facility: CLINIC | Age: 83
End: 2023-06-29

## 2023-06-29 ENCOUNTER — CLINICAL DOCUMENTATION (OUTPATIENT)
Dept: SPIRITUAL SERVICES | Age: 83
End: 2023-06-29

## 2023-06-29 NOTE — TELEPHONE ENCOUNTER
----- Message from Ronda sent at 6/28/2023  2:05 PM EDT -----  Subject: Message to Provider    QUESTIONS  Information for Provider? Pt daughter Sally Joseph is calling to see if her   pcp has called over to the neurology group in [facility] to be seen   sooner than November. Please call pt daughter back. ---------------------------------------------------------------------------  --------------  Jamaica Hernandez [facility]  [phone]; OK to leave message on voicemail  ---------------------------------------------------------------------------  --------------  SCRIPT ANSWERS  Relationship to Patient? Other/Third Party  Representative Name? Sally Joseph   Is the representative on the Communication Release of Information (CAROLINA)   form in Epic?  Yes

## 2023-07-06 ENCOUNTER — CLINICAL DOCUMENTATION (OUTPATIENT)
Dept: SPIRITUAL SERVICES | Age: 83
End: 2023-07-06

## 2023-07-06 NOTE — ACP (ADVANCE CARE PLANNING)
Advance Care Planning     Advance Care Planning Clinical Specialist  Conversation Note      Date of ACP Conversation: 7/6/2023    Conversation Conducted with: Patient with Decision Making Capacity    ACP Clinical Specialist: Tyree Cesar LCSW        Healthcare Decision Maker:     Current Designated Healthcare Decision Maker:     Primary Decision Maker: Dejah Biggs - Child - [phone]    Secondary Decision Maker: Abel Puri Child - [phone]        Care Preferences    Hospitalization: \"If your health worsens and it becomes clear that your chance of recovery is unlikely, what would your preference be regarding hospitalization? \" Not sure\"    Choice:  [] The patient wants hospitalization. [] The patient prefers comfort-focused treatment without hospitalization. Ventilation: \"If you were in your present state of health and suddenly became very ill and were unable to breathe on your own, what would your preference be about the use of a ventilator (breathing machine) if it were available to you? \"      If the patient would desire the use of ventilator (breathing machine), answer \"yes\". If not, \"no\": yes    \"If your health worsens and it becomes clear that your chance of recovery is unlikely, what would your preference be about the use of a ventilator (breathing machine) if it were available to you? \"     Would the patient desire the use of ventilator (breathing machine)?: No      Resuscitation  \"CPR works best to restart the heart when there is a sudden event, like a heart attack, in someone who is otherwise healthy. Unfortunately, CPR does not typically restart the heart for people who have serious health conditions or who are very sick. \"    \"In the event your heart stopped as a result of an underlying serious health condition, would you want attempts to be made to restart your heart (answer \"yes\" for attempt to resuscitate) or would you prefer a natural death (answer \"no\" for do not attempt to

## 2023-07-13 ENCOUNTER — TELEPHONE (OUTPATIENT)
Age: 83
End: 2023-07-13

## 2023-07-13 NOTE — TELEPHONE ENCOUNTER
Pt daughter called and need to know do pt need blood work done before procedure,please advise    [phone]

## 2023-07-14 NOTE — TELEPHONE ENCOUNTER
Verified patient with two types of identifiers. Spoke with daughter Wang Roger, verified with PHI. Informed daughter that patient can have her lab work done now for her HAZEL on 8/3. Patient verbalized understanding and will call with any other questions.

## 2023-07-25 DIAGNOSIS — R55 SYNCOPE: ICD-10-CM

## 2023-07-25 DIAGNOSIS — I48.0 PAROXYSMAL ATRIAL FIBRILLATION (HCC): ICD-10-CM

## 2023-07-26 ENCOUNTER — PREP FOR PROCEDURE (OUTPATIENT)
Age: 83
End: 2023-07-26

## 2023-07-26 LAB
ANION GAP SERPL CALC-SCNC: 5 MMOL/L (ref 5–15)
BASOPHILS # BLD: 0 K/UL (ref 0–0.1)
BASOPHILS NFR BLD: 0 % (ref 0–1)
BUN SERPL-MCNC: 14 MG/DL (ref 6–20)
BUN/CREAT SERPL: 19 (ref 12–20)
CALCIUM SERPL-MCNC: 9.5 MG/DL (ref 8.5–10.1)
CHLORIDE SERPL-SCNC: 109 MMOL/L (ref 97–108)
CO2 SERPL-SCNC: 25 MMOL/L (ref 21–32)
CREAT SERPL-MCNC: 0.72 MG/DL (ref 0.55–1.02)
DIFFERENTIAL METHOD BLD: NORMAL
EOSINOPHIL # BLD: 0 K/UL (ref 0–0.4)
EOSINOPHIL NFR BLD: 1 % (ref 0–7)
ERYTHROCYTE [DISTWIDTH] IN BLOOD BY AUTOMATED COUNT: 12.6 % (ref 11.5–14.5)
GLUCOSE SERPL-MCNC: 92 MG/DL (ref 65–100)
HCT VFR BLD AUTO: 38.9 % (ref 35–47)
HGB BLD-MCNC: 12.4 G/DL (ref 11.5–16)
IMM GRANULOCYTES # BLD AUTO: 0 K/UL (ref 0–0.04)
IMM GRANULOCYTES NFR BLD AUTO: 0 % (ref 0–0.5)
LYMPHOCYTES # BLD: 1.9 K/UL (ref 0.8–3.5)
LYMPHOCYTES NFR BLD: 26 % (ref 12–49)
MCH RBC QN AUTO: 30.9 PG (ref 26–34)
MCHC RBC AUTO-ENTMCNC: 31.9 G/DL (ref 30–36.5)
MCV RBC AUTO: 97 FL (ref 80–99)
MONOCYTES # BLD: 0.4 K/UL (ref 0–1)
MONOCYTES NFR BLD: 6 % (ref 5–13)
NEUTS SEG # BLD: 5 K/UL (ref 1.8–8)
NEUTS SEG NFR BLD: 67 % (ref 32–75)
NRBC # BLD: 0 K/UL (ref 0–0.01)
NRBC BLD-RTO: 0 PER 100 WBC
PLATELET # BLD AUTO: 197 K/UL (ref 150–400)
PMV BLD AUTO: 11.4 FL (ref 8.9–12.9)
POTASSIUM SERPL-SCNC: 4.1 MMOL/L (ref 3.5–5.1)
RBC # BLD AUTO: 4.01 M/UL (ref 3.8–5.2)
SODIUM SERPL-SCNC: 139 MMOL/L (ref 136–145)
WBC # BLD AUTO: 7.4 K/UL (ref 3.6–11)

## 2023-08-03 ENCOUNTER — HOSPITAL ENCOUNTER (OUTPATIENT)
Facility: HOSPITAL | Age: 83
Discharge: HOME | End: 2023-08-05
Attending: INTERNAL MEDICINE
Payer: MEDICARE

## 2023-08-03 ENCOUNTER — TELEPHONE (OUTPATIENT)
Dept: PRIMARY CARE CLINIC | Facility: CLINIC | Age: 83
End: 2023-08-03

## 2023-08-03 VITALS
RESPIRATION RATE: 18 BRPM | BODY MASS INDEX: 27.93 KG/M2 | SYSTOLIC BLOOD PRESSURE: 167 MMHG | OXYGEN SATURATION: 100 % | TEMPERATURE: 98 F | HEART RATE: 65 BPM | WEIGHT: 143 LBS | DIASTOLIC BLOOD PRESSURE: 73 MMHG

## 2023-08-03 DIAGNOSIS — Z98.890 HISTORY OF LOOP RECORDER: ICD-10-CM

## 2023-08-03 DIAGNOSIS — R55 SYNCOPE AND COLLAPSE: ICD-10-CM

## 2023-08-03 PROCEDURE — 2500000003 HC RX 250 WO HCPCS: Performed by: INTERNAL MEDICINE

## 2023-08-03 PROCEDURE — C1764 EVENT RECORDER, CARDIAC: HCPCS

## 2023-08-03 PROCEDURE — 33285 INSJ SUBQ CAR RHYTHM MNTR: CPT

## 2023-08-03 RX ORDER — CEPHALEXIN 250 MG/1
250 CAPSULE ORAL 3 TIMES DAILY
Qty: 9 CAPSULE | Refills: 0 | Status: SHIPPED | OUTPATIENT
Start: 2023-08-03 | End: 2023-08-06

## 2023-08-03 RX ORDER — LIDOCAINE HYDROCHLORIDE AND EPINEPHRINE 20; 5 MG/ML; UG/ML
20 INJECTION, SOLUTION EPIDURAL; INFILTRATION; INTRACAUDAL; PERINEURAL ONCE
Status: COMPLETED | OUTPATIENT
Start: 2023-08-03 | End: 2023-08-03

## 2023-08-03 RX ADMIN — LIDOCAINE HYDROCHLORIDE,EPINEPHRINE BITARTRATE 10 ML: 20; .005 INJECTION, SOLUTION EPIDURAL; INFILTRATION; INTRACAUDAL; PERINEURAL at 12:25

## 2023-08-03 ASSESSMENT — PAIN SCALES - GENERAL: PAINLEVEL_OUTOF10: 0

## 2023-08-03 NOTE — H&P
----- Message from Ashwin Cohn MD sent at 10/26/2017  8:21 AM CDT -----  Please let her know that her carotid Doppler study was normal without significant obstruction.   [facility] [employer] [facility]  Cardiac Electrophysiology H&P Note                  []Initial visit     [x]Established visit     Patient Name: Chung Price - XQA:[id number]  Primary Cardiologist: Sri Nino MD  Electrophysiologist: Doris Chandler MD     Reason for visit: ILR implant    HPI:  Ms. Kristen Curtis is a 81 y.o. female who presents today for planned ILR placement. She had ER visit for near syncope in 05/2023, unknown cause. She's had similar episodes as well. She did have syncope & CVA in 2022 as well. Echo in 01/2023 showed normal LVEF, no significant structural abnormality. Holter monitor in 01/2023 showed brief AF with RVR, some ventricular bigeminy. Previous:  Carotid stenosis, is s/p left TCAR 12/30/2022 & right carotid TCAR 04/2023 by Dr. Steiner Sessions. CVA & syncope in 2022. Dementia. Assessment and Plan     Near syncope/syncope: Recurrent, but not clear is due to bradycardia. Not associated with exertion. Reviewed risks/benefits of ILR implant for long term arrhythmia/bradycardia/pause surveillance. She agrees to proceed as scheduled. PAF with RVR: Noted via holter monitor after CVA. Not on meds for rate/rhythm control. Continue anticoagulation. Carotid stenosis: S/p left TCAR 12/30/2022 & right carotid TCAR 04/2023 by Dr. Steiner Sessions. She will continue to follow with him. Anticoagulation: Continue Eliquis for embolic CVA prophylaxis. Previous epistaxis, but no recent bleeding issues. Should she have bleeding problems in the future, could consider Watchman. Addendum from EP attending: This patient was seen and examined by me in a face-to-face visit today. I reviewed the medical record including lab results, imaging and other provider notes. I confirmed the history as described above. I spoke to the patient, obtained history and examined the patient. I discussed assessments and plans in details with nurse practitioner.  Below are my

## 2023-08-03 NOTE — PROGRESS NOTES
Patient is awake, alert, and oriented. VSS. Left chest dsg dry and intact. Discussed AVS and discharge instructions with patient and with patient's daughter and I provided a copy for her to take home. Alen Rosen, [employer], reviewed instructions regarding monitoring of implant. Pt and daughter voiced understanding and denied any questions or need for clarification. Pt has docking station and pt manual.      Transported with:  Maritza Talbot via w/c to vehicle driven by her daughter.

## 2023-08-03 NOTE — TELEPHONE ENCOUNTER
Patient's daughter Cara Javier who is on her HIPPA called requesting refills on her mother's aspirin. She said refills for the aspirin 81 mg EC should be going to [facility] on [address], #[phone]. Please reach out to patient's daughter Cara Javier at # [phone].

## 2023-08-04 RX ORDER — ASPIRIN 81 MG/1
81 TABLET ORAL DAILY
Qty: 30 TABLET | Refills: 5 | Status: SHIPPED | OUTPATIENT
Start: 2023-08-04

## 2023-08-04 NOTE — PROCEDURES
Cardiac Procedure Note   Patient: Je Castillo  MRN: [id number]  SSN: [id number]   YOB: 1940 Age: 81 y.o.   Sex: female    Date of Procedure: 8/3/2023   Pre-procedure Diagnosis: syncope and near syncope episodes  Post-procedure Diagnosis: same  Procedure: loop recorder implant  :  Dr. Rikki Brooks MD    Assistant(s):  None  Anesthesia: local lidocaine 2%  Estimated Blood Loss: Less than 10 mL   Specimens Removed: None  Findings: left chest loop recorder implant  Complications: None   Implants:  [employer] ILR  Signed by:  Rikki Brooks MD  8/3/2023  10:55 PM

## 2023-08-06 ENCOUNTER — CLINICAL DOCUMENTATION (OUTPATIENT)
Age: 83
End: 2023-08-06

## 2023-08-07 ENCOUNTER — TELEPHONE (OUTPATIENT)
Age: 83
End: 2023-08-07

## 2023-08-07 ENCOUNTER — NURSE ONLY (OUTPATIENT)
Age: 83
End: 2023-08-07

## 2023-08-07 DIAGNOSIS — Z51.89 VISIT FOR WOUND CHECK: Primary | ICD-10-CM

## 2023-08-07 NOTE — TELEPHONE ENCOUNTER
Verified patient with two types of identifiers. Spoke with daughter verified on PHI. Patient s/p ILR on 8/3/23. She states patient's site red and irritated. She also states there is excessive bruising. She would like to bring patient in for wound check. Scheduled appointment for today. Patient verbalized understanding and will call with any other questions. Future Appointments   Date Time Provider [address]   8/7/2023 11:40 AM NETTIE Bedolla NP AMB   9/11/2023  9:30 AM NETTIE Gutierrez - [facility] NEUROWTCRSPLUISANA BS AMB   11/17/2023  3:00 PM NETTIE Antonio - NP NEUMRSPLUISANA BS AMB   11/22/2023  3:40 PM MD PATRICK Morrison BS AMB     Per Dr. Kathy Scott to look for bradycardia/cause of dizziness  Eliquis for PAF  Plavix for carotid stent     Need to stop aspirin\"    Will notify patient at wound check today.

## 2023-08-07 NOTE — PROGRESS NOTES
Patient is s/p ILR on 08/03/2023, called to report swelling & discoloration of site. Fading ecchymosis noted along most of left breast.  Insertion site closed, has scab in place. No drainage. Per Dr. Ankush Wang, advised patient to stop ASA. She will call for any drainage or open area.

## 2023-08-07 NOTE — TELEPHONE ENCOUNTER
Pt's daughter is calling because her mother has swelling and soreness around the site where she had surgery on 8/3/23    Please advise    [phone] Carlos Tamez (daughter)

## 2023-08-11 ENCOUNTER — TELEPHONE (OUTPATIENT)
Age: 83
End: 2023-08-11

## 2023-08-11 NOTE — TELEPHONE ENCOUNTER
2 patient ids. Darvin Jerome- daughter) Called to help patient with sending a manual transmission from her home monitoring device to establish connectivity for future appointments. Daughter not home at the moment and would like for me to reach back in 30 minutes to help her with the process.

## 2023-08-11 NOTE — TELEPHONE ENCOUNTER
2 patient ids. Called daughter to walk her through a manual transmission being sent to establish connectivity for home monitoring. Device produced an orange light that indicates an error per [employer] Instructed daughter to unplug and plug the device from power to retry. Unable to send transmission. Daughter has cell service issues in her area. Will call [employer] at [phone].  Will follow up with patient on Monday at 4pm per daughter's request

## 2023-08-14 ENCOUNTER — TELEPHONE (OUTPATIENT)
Age: 83
End: 2023-08-14

## 2023-08-14 NOTE — TELEPHONE ENCOUNTER
2 patient ids. Daughter returning phone call from Friday regarding mother's device being connected for home monitoring. They called [employer] and was made aware that the device wasn't registered at the time of implant. I will reach out to the [employer] Rep for further information. Will follow up with findings.

## 2023-08-14 NOTE — TELEPHONE ENCOUNTER
2 PATIENT IDS. Called daughter back to let her know that the transmission was unsuccessful. Daughter will try again overnight and will double check in the morning to see if it transmitted.

## 2023-08-14 NOTE — TELEPHONE ENCOUNTER
2 patient ids. Called daughter to let her know that we now have the profile set up and she can try to send a manual transmission. Will call back in an hour to update if it was received.

## 2023-08-15 ENCOUNTER — TELEPHONE (OUTPATIENT)
Age: 83
End: 2023-08-15

## 2023-08-15 NOTE — TELEPHONE ENCOUNTER
2 pt ids. Called patient's daughter to update that we are now fully connected with home monitoring and transmission are now setup to transmit every 31 days, automatically.

## 2023-09-10 NOTE — PROGRESS NOTES
Bruce Engel is a 81 y.o. female who presents with the following  Chief Complaint   Patient presents with    Follow-up     Possible stroke      Last office visit note  HPI  Patient is here today with her daughter for hospital follow-up. Patient was seen by Dr. Carlos Wilson January 9 after she was admitted for near syncope. Patient has moderate dementia and a history of stroke (while living in [place]). Patient's daughter had witnessed the event and reports that the patient had just showered and was found standing in the doorway of her room a few steps from the shower unclothed leaning on the frame not responding with eyes open. She was lowered to the ground. She had complaints of being lightheaded and remained sitting due to vomiting. When she stopped vomiting she was at baseline. She never had any loss of consciousness, and no seizure activity. She was advised in October 2022 after finding she had a stroke at a hospital in [place] that she would need cardiac monitoring after discharge but because she was moving to [place] to live with her daughter this was never done. It was later found that she had bilateral ICA stenosis and had left transcarotid artery revascularization on January 2, 2023, and the right side (82% stenosis) is scheduled for April 14, 2023. She is on aspirin 81 mg and Lipitor 80 mg daily at home. Dr. Carlos Wilson started the patient on Aricept 5 mg at discharge for her dementia. Patient states that she has not had this medication since she ran out of it and could not get a refill. Since her discharge she has seen cardiology and had A-fib with RVR on her external loop monitor. She is currently taking Eliquis 5 mg twice daily for this. Plavix was stopped when Eliquis was started. She is not had any episodes of syncope or near syncope since this occurred. She was in physical therapy that ended yesterday.    Today the patient could tell me the day, the year, the current president,

## 2023-09-11 ENCOUNTER — OFFICE VISIT (OUTPATIENT)
Age: 83
End: 2023-09-11
Payer: MEDICARE

## 2023-09-11 VITALS
HEART RATE: 66 BPM | OXYGEN SATURATION: 99 % | DIASTOLIC BLOOD PRESSURE: 58 MMHG | RESPIRATION RATE: 20 BRPM | SYSTOLIC BLOOD PRESSURE: 148 MMHG

## 2023-09-11 DIAGNOSIS — R55 SYNCOPE AND COLLAPSE: ICD-10-CM

## 2023-09-11 DIAGNOSIS — G30.1 ALZHEIMER'S DISEASE WITH LATE ONSET (CODE) (HCC): Primary | ICD-10-CM

## 2023-09-11 PROCEDURE — G8419 CALC BMI OUT NRM PARAM NOF/U: HCPCS

## 2023-09-11 PROCEDURE — 1090F PRES/ABSN URINE INCON ASSESS: CPT

## 2023-09-11 PROCEDURE — G8400 PT W/DXA NO RESULTS DOC: HCPCS

## 2023-09-11 PROCEDURE — 1123F ACP DISCUSS/DSCN MKR DOCD: CPT

## 2023-09-11 PROCEDURE — G8427 DOCREV CUR MEDS BY ELIG CLIN: HCPCS

## 2023-09-11 PROCEDURE — 1036F TOBACCO NON-USER: CPT

## 2023-09-11 PROCEDURE — 99214 OFFICE O/P EST MOD 30 MIN: CPT

## 2023-09-11 RX ORDER — MEMANTINE HYDROCHLORIDE 10 MG/1
10 TABLET ORAL 2 TIMES DAILY
Qty: 60 TABLET | Refills: 1 | Status: SHIPPED | OUTPATIENT
Start: 2023-10-09

## 2023-09-11 RX ORDER — MEMANTINE HYDROCHLORIDE 5 MG/1
TABLET ORAL
Qty: 180 TABLET | Refills: 1 | Status: SHIPPED | OUTPATIENT
Start: 2023-09-11

## 2023-09-11 NOTE — PROGRESS NOTES
Per the pt she stated she has been doing okay  Accompanied by her daughter   NO changes that she has noticed

## 2023-10-02 ENCOUNTER — PROCEDURE VISIT (OUTPATIENT)
Age: 83
End: 2023-10-02

## 2023-10-02 DIAGNOSIS — R55 SYNCOPE AND COLLAPSE: Primary | ICD-10-CM

## 2023-10-11 ENCOUNTER — TELEPHONE (OUTPATIENT)
Age: 83
End: 2023-10-11

## 2023-10-11 DIAGNOSIS — G30.1 ALZHEIMER'S DISEASE WITH LATE ONSET (CODE) (HCC): ICD-10-CM

## 2023-10-11 RX ORDER — MEMANTINE HYDROCHLORIDE 10 MG/1
10 TABLET ORAL 2 TIMES DAILY
Qty: 90 TABLET | Refills: 2 | Status: SHIPPED | OUTPATIENT
Start: 2023-10-11

## 2023-10-11 NOTE — TELEPHONE ENCOUNTER
Patients daughter was informed by the pharmacy that her mother would need a script for a  90 day supply of the medication NAMENDA 10 mg

## 2023-10-14 NOTE — PROCEDURES
[facility] [place]   Electroencephalogram Report    Procedure ID: [id number] Procedure Date: 10/2/2023   Patient Name: Siri Mak YOB: 1940   Procedure Type: Routine Medical Record No: [id number]       An EEG is requested in this 81-year-old lady to evaluate for epileptiform abnormalities. Medications such include Namenda and Aricept. This tracing is obtained during the awake state. During wakefulness there are intermittent runs of posteriorly dominant and symmetric low to medium amplitude 8 cps activities which attenuate with eye opening. Lower voltage faster frequency activities are seen symmetrically over the anterior head regions. Hyperventilation little alters the tracing. Intermittent photic stimulation little alters the tracing. Sleep is not attained.     Interpretation  This EEG recorded during the awake state is normal.        Marisa Peck MD

## 2023-10-24 DIAGNOSIS — I48.91 UNSPECIFIED ATRIAL FIBRILLATION (HCC): ICD-10-CM

## 2023-10-24 RX ORDER — APIXABAN 5 MG/1
5 TABLET, FILM COATED ORAL 2 TIMES DAILY
Qty: 180 TABLET | Refills: 3 | Status: SHIPPED | OUTPATIENT
Start: 2023-10-24

## 2023-10-24 NOTE — TELEPHONE ENCOUNTER
Requested Prescriptions     Signed Prescriptions Disp Refills    apixaban (ELIQUIS) 5 MG TABS tablet 180 tablet 3     Sig: TAKE 1 TABLET BY MOUTH TWICE A DAY     Authorizing Provider: Royal Monet     Ordering User:  Wilber Renee per Dr. Keli Germain.     Future Appointments   Date Time Provider [address]   11/17/2023  3:00 PM NETTIE Rojas NP NEUMRSPLUISANA [place] AMB   11/29/2023  3:40 PM MD PATRICK Emerson BS AMB

## 2023-10-26 DIAGNOSIS — F03.B0: ICD-10-CM

## 2023-10-26 RX ORDER — DONEPEZIL HYDROCHLORIDE 10 MG/1
10 TABLET, FILM COATED ORAL
Qty: 90 TABLET | Refills: 1 | Status: SHIPPED | OUTPATIENT
Start: 2023-10-26

## 2023-10-30 NOTE — TELEPHONE ENCOUNTER
Patients daughter asked for a refill of Amlodipine 5 mg  Sent to [facility] pharmacy at [address], [place]

## 2023-10-31 RX ORDER — AMLODIPINE BESYLATE 5 MG/1
5 TABLET ORAL EVERY MORNING
Qty: 90 TABLET | Refills: 1 | Status: SHIPPED | OUTPATIENT
Start: 2023-10-31

## 2023-11-17 ENCOUNTER — OFFICE VISIT (OUTPATIENT)
Age: 83
End: 2023-11-17
Payer: MEDICARE

## 2023-11-17 VITALS
HEIGHT: 60 IN | BODY MASS INDEX: 30.43 KG/M2 | HEART RATE: 61 BPM | DIASTOLIC BLOOD PRESSURE: 60 MMHG | OXYGEN SATURATION: 98 % | TEMPERATURE: 97.7 F | RESPIRATION RATE: 16 BRPM | WEIGHT: 155 LBS | SYSTOLIC BLOOD PRESSURE: 145 MMHG

## 2023-11-17 DIAGNOSIS — G30.1 ALZHEIMER'S DISEASE WITH LATE ONSET (CODE) (HCC): Primary | ICD-10-CM

## 2023-11-17 DIAGNOSIS — R55 SYNCOPE AND COLLAPSE: ICD-10-CM

## 2023-11-17 DIAGNOSIS — Z91.89 STROKE RISK: ICD-10-CM

## 2023-11-17 PROCEDURE — 1090F PRES/ABSN URINE INCON ASSESS: CPT

## 2023-11-17 PROCEDURE — G8484 FLU IMMUNIZE NO ADMIN: HCPCS

## 2023-11-17 PROCEDURE — 1123F ACP DISCUSS/DSCN MKR DOCD: CPT

## 2023-11-17 PROCEDURE — G8427 DOCREV CUR MEDS BY ELIG CLIN: HCPCS

## 2023-11-17 PROCEDURE — G8400 PT W/DXA NO RESULTS DOC: HCPCS

## 2023-11-17 PROCEDURE — G8417 CALC BMI ABV UP PARAM F/U: HCPCS

## 2023-11-17 PROCEDURE — 1036F TOBACCO NON-USER: CPT

## 2023-11-17 PROCEDURE — 99215 OFFICE O/P EST HI 40 MIN: CPT

## 2023-11-17 RX ORDER — SALICYLIC ACID 40 %
81 ADHESIVE PATCH, MEDICATED TOPICAL DAILY
COMMUNITY
Start: 2023-10-30

## 2023-11-17 ASSESSMENT — PATIENT HEALTH QUESTIONNAIRE - PHQ9
SUM OF ALL RESPONSES TO PHQ QUESTIONS 1-9: 0
SUM OF ALL RESPONSES TO PHQ9 QUESTIONS 1 & 2: 0
SUM OF ALL RESPONSES TO PHQ QUESTIONS 1-9: 0
2. FEELING DOWN, DEPRESSED OR HOPELESS: 0
SUM OF ALL RESPONSES TO PHQ QUESTIONS 1-9: 0
SUM OF ALL RESPONSES TO PHQ QUESTIONS 1-9: 0
1. LITTLE INTEREST OR PLEASURE IN DOING THINGS: 0

## 2023-11-17 ASSESSMENT — ENCOUNTER SYMPTOMS
GASTROINTESTINAL NEGATIVE: 1
RESPIRATORY NEGATIVE: 1
EYES NEGATIVE: 1
ALLERGIC/IMMUNOLOGIC NEGATIVE: 1

## 2023-11-17 NOTE — PATIENT INSTRUCTIONS
As per our discussion,    Overall, you are doing really well. I encourage you to continue follow-up with your PCP and other specialists for your other comorbid conditions as appropriate. As for your memory, continue Aricept 10 mg nightly and Namenda 10 mg twice a day. I encouraged you  to engage in approximately 2.5 hours of physician approved physical activity on a weekly basis. To maintain a healthy diet. they will also be informed of the Mediterranean diet, which has been associated with reduced risk for neurodegenerative conditions as well as heart disease. To maintain a cognitively stimulated lifestyle, also incorporating social interaction. It was a pleasure to meet you and your daughter today. We will see you back in 8 months or sooner if needed    Please do not hesitate to reach out for any questions or concerns.

## 2023-11-17 NOTE — PROGRESS NOTES
in no apparent distress and well groomed. Psych/mental health:  Affect: Appropriate    PHQ       No data to display                HEENT:   Normocephalic  With evidence of trauma: No  Full range of motion head neck: Yes  Tenderness to palpation of the head neck region: No      Cardiovascular:     Extremities warm to touch: Yes  Extremity swelling: No  Discoloration: No  Evidence of PVD: No    Respiratory:   Dyspnea on exertion: No   Abnormal effort on casual observation: No   Use of portable oxygen: No   Evidence of cyanosis: No     Musculoskeletal:   Evidence of significant bone deformities: No   Spinal curvature: No     Integumentary:    Obvious bruising: No   Lacerations or discoloration on casual observation: No       Neurological Examination:   Mental Status:        MMSE  No flowsheet data found. Formal testing was not completed    there was nothing concerning on general observation and discussion. Alert oriented and appropriate to general conversation  Normal processing on general observation  Followed conversation and responded seemingly appropriate throughout the office visit  No word finding difficulties noted on casual observation  Able to follow directions without difficulty     Alert oriented x4. Was able to recognize elbow and watch  She was able to solve simple math questions without any difficulty  Was able to draw clock without any issues.     Cranial Nerves:    EOMs intact gaze is conjugate  No nystagmus is appreciated  Facial motor intact bilaterally  Hearing intact to conversation  Voice with normal projection, no evidence of secretion pooling  Shoulder shrug intact bilaterally  No tongue deviation appreciated     Motor:   Normal bulk  No tremor appreciated on today's exam  No abnormal movements appreciated on today's exam  Moves extremities spontaneously and with purpose  5/5 x 4      Sensation: Intact to light touch    Coordination/Cerebellar: Finger-to-nose intact bilateral    Gait:

## 2023-11-18 NOTE — ASSESSMENT & PLAN NOTE
Patient denies any recurrence of symptoms. EEG completed on 10/14/2023, read by Dr. Shantel Pineda was normal.    Holter monitor completed on 3/1/2023 showed atrial fibrillation with RVR status post loop recorder implant on 8/3/2023. She has been followed with cardiology. No intervention needed at this time from neurological standpoint. She is to continue apixaban 5 mg twice a day. Patient is to continue to follow-up with PCP and other specialists including cardiology for the comorbid conditions management as appropriate. Fall safety was discussed with patient and family.

## 2023-11-18 NOTE — ASSESSMENT & PLAN NOTE
Stable without recurrence of symptoms     Patient is to continue on aspirin 81 mg, atorvastatin 80 mg, and apixaban 5 mg twice a day. Patient is to continue to work to all of his comorbid conditions as managed by PCP and other specialists as appropriate    RECOMMENDATIONS:  - BP goal is less than 140/90  - Goal HbA1c is less than 7.   - LDL less than 70     Stroke education was provided today in regards to risk factors for stroke and lifestyle modifications to help minimize the risk of future stroke. This included medication compliance, regular follow up with primary care physician,  and healthy lifestyle habits (nutrition/exercise).

## 2023-11-18 NOTE — ASSESSMENT & PLAN NOTE
Stable    We will not make any changes on her medication at this time. Patient is to continue to take donepezil 10 mg at dinnertime and memantine 10 mg twice a day. She does not need any refills today. Patient was encouraged to engage in approximately 2.5 hours of physician approved physical activity on a weekly basis. Patient was encouraged to maintain a healthy diet. they will also be informed of the Mediterranean diet, which has been associated with reduced risk for neurodegenerative conditions as well as heart disease. Patient was encouraged to maintain a cognitively stimulated lifestyle, also incorporating social interaction.

## 2023-11-21 RX ORDER — LISINOPRIL 20 MG/1
20 TABLET ORAL EVERY MORNING
Qty: 90 TABLET | Refills: 1 | Status: SHIPPED | OUTPATIENT
Start: 2023-11-21

## 2023-11-29 ENCOUNTER — OFFICE VISIT (OUTPATIENT)
Age: 83
End: 2023-11-29
Payer: MEDICARE

## 2023-11-29 VITALS
DIASTOLIC BLOOD PRESSURE: 74 MMHG | WEIGHT: 157.8 LBS | OXYGEN SATURATION: 99 % | HEIGHT: 60 IN | SYSTOLIC BLOOD PRESSURE: 142 MMHG | HEART RATE: 78 BPM | BODY MASS INDEX: 30.98 KG/M2

## 2023-11-29 DIAGNOSIS — I10 BENIGN ESSENTIAL HTN: ICD-10-CM

## 2023-11-29 DIAGNOSIS — R55 SYNCOPE, UNSPECIFIED SYNCOPE TYPE: Primary | ICD-10-CM

## 2023-11-29 DIAGNOSIS — Z86.73 HISTORY OF CVA (CEREBROVASCULAR ACCIDENT): ICD-10-CM

## 2023-11-29 PROCEDURE — 99214 OFFICE O/P EST MOD 30 MIN: CPT | Performed by: INTERNAL MEDICINE

## 2023-11-29 ASSESSMENT — PATIENT HEALTH QUESTIONNAIRE - PHQ9
2. FEELING DOWN, DEPRESSED OR HOPELESS: 0
SUM OF ALL RESPONSES TO PHQ QUESTIONS 1-9: 0
SUM OF ALL RESPONSES TO PHQ QUESTIONS 1-9: 0
1. LITTLE INTEREST OR PLEASURE IN DOING THINGS: 0
SUM OF ALL RESPONSES TO PHQ QUESTIONS 1-9: 0
SUM OF ALL RESPONSES TO PHQ9 QUESTIONS 1 & 2: 0
SUM OF ALL RESPONSES TO PHQ QUESTIONS 1-9: 0

## 2023-11-30 RX ORDER — ATORVASTATIN CALCIUM 80 MG/1
80 TABLET, FILM COATED ORAL NIGHTLY
Qty: 30 TABLET | Refills: 0 | Status: SHIPPED | OUTPATIENT
Start: 2023-11-30

## 2024-01-02 RX ORDER — ATORVASTATIN CALCIUM 80 MG/1
80 TABLET, FILM COATED ORAL NIGHTLY
Qty: 30 TABLET | Refills: 0 | OUTPATIENT
Start: 2024-01-02

## 2024-01-04 ENCOUNTER — TELEPHONE (OUTPATIENT)
Dept: PRIMARY CARE CLINIC | Facility: CLINIC | Age: 84
End: 2024-01-04

## 2024-01-04 DIAGNOSIS — G30.1 ALZHEIMER'S DISEASE WITH LATE ONSET (CODE) (HCC): ICD-10-CM

## 2024-01-04 RX ORDER — MEMANTINE HYDROCHLORIDE 10 MG/1
10 TABLET ORAL 2 TIMES DAILY
Qty: 180 TABLET | Refills: 1 | Status: SHIPPED | OUTPATIENT
Start: 2024-01-04

## 2024-01-04 RX ORDER — ATORVASTATIN CALCIUM 80 MG/1
80 TABLET, FILM COATED ORAL NIGHTLY
Qty: 30 TABLET | Refills: 0 | Status: SHIPPED | OUTPATIENT
Start: 2024-01-04

## 2024-01-04 RX ORDER — ATORVASTATIN CALCIUM 80 MG/1
80 TABLET, FILM COATED ORAL NIGHTLY
Qty: 30 TABLET | Refills: 0 | Status: SHIPPED | OUTPATIENT
Start: 2024-01-04 | End: 2024-01-04 | Stop reason: SDUPTHER

## 2024-01-04 NOTE — TELEPHONE ENCOUNTER
Pt's daughter (Michelle - [phone]) is advised that they received a refill on the incorrect medication. She would like to get a refill on:    Atorvastatin (80 MG) tablet needs enough until the VV on 1/22/24    She advised that they received a refill of Memantine (10 MG) tablet    Please send to:   [id number]  P [phone]  F [phone]    Please assist with this matter.     VLT - PSR

## 2024-01-05 NOTE — TELEPHONE ENCOUNTER
Called back no answer left vm to call office back    Per Dr. Cabrera Did they mean [phone]? I sent it there because that matched the phone number. That [facility] is in [place].

## 2024-01-08 NOTE — TELEPHONE ENCOUNTER
Requested Prescriptions     Pending Prescriptions Disp Refills    atorvastatin (LIPITOR) 80 MG tablet 30 tablet 0     Sig: Take 1 tablet by mouth nightly Needs labs/ appt.        Last Visit  Last Refill 1/4/24

## 2024-01-08 NOTE — TELEPHONE ENCOUNTER
----- Message from Sherrill Marie sent at 1/3/2024  4:47 PM EST -----  Subject: Refill Request    QUESTIONS  Name of Medication? atorvastatin (LIPITOR) 80 MG tablet  Patient-reported dosage and instructions? QD  How many days do you have left? 0  Preferred Pharmacy? [id number]  Pharmacy phone number (if available)? [phone]  Additional Information for Provider? Pt has been without medication since   Saturday 12/30, scheduled next available appt on 01/22, would like a   refill on Rx to make it to her next appt. Please advise.   ---------------------------------------------------------------------------  --------------  CALL BACK INFO  What is the best way for the office to contact you? OK to leave message on   voicemail  Preferred Call Back Phone Number? [phone]  ---------------------------------------------------------------------------  --------------  SCRIPT ANSWERS  Relationship to Patient? Other/Third Party  Representative Name? Michelle No  Is the representative on the Communication Release of Information ([place])   form in Epic? Yes

## 2024-01-10 RX ORDER — ATORVASTATIN CALCIUM 80 MG/1
80 TABLET, FILM COATED ORAL NIGHTLY
Qty: 30 TABLET | Refills: 0 | Status: SHIPPED | OUTPATIENT
Start: 2024-01-10

## 2024-01-22 ENCOUNTER — TELEMEDICINE (OUTPATIENT)
Dept: PRIMARY CARE CLINIC | Facility: CLINIC | Age: 84
End: 2024-01-22
Payer: MEDICARE

## 2024-01-22 DIAGNOSIS — Z78.0 POST-MENOPAUSAL: ICD-10-CM

## 2024-01-22 DIAGNOSIS — E78.5 HYPERLIPIDEMIA, UNSPECIFIED HYPERLIPIDEMIA TYPE: Primary | ICD-10-CM

## 2024-01-22 DIAGNOSIS — Z00.00 MEDICARE ANNUAL WELLNESS VISIT, SUBSEQUENT: ICD-10-CM

## 2024-01-22 DIAGNOSIS — E66.9 OBESITY (BMI 30-39.9): ICD-10-CM

## 2024-01-22 DIAGNOSIS — Z12.31 ENCOUNTER FOR SCREENING MAMMOGRAM FOR MALIGNANT NEOPLASM OF BREAST: ICD-10-CM

## 2024-01-22 DIAGNOSIS — I10 PRIMARY HYPERTENSION: ICD-10-CM

## 2024-01-22 PROCEDURE — 1036F TOBACCO NON-USER: CPT | Performed by: FAMILY MEDICINE

## 2024-01-22 PROCEDURE — 1123F ACP DISCUSS/DSCN MKR DOCD: CPT | Performed by: FAMILY MEDICINE

## 2024-01-22 PROCEDURE — G8484 FLU IMMUNIZE NO ADMIN: HCPCS | Performed by: FAMILY MEDICINE

## 2024-01-22 PROCEDURE — G0447 BEHAVIOR COUNSEL OBESITY 15M: HCPCS | Performed by: FAMILY MEDICINE

## 2024-01-22 PROCEDURE — 99214 OFFICE O/P EST MOD 30 MIN: CPT | Performed by: FAMILY MEDICINE

## 2024-01-22 PROCEDURE — G8417 CALC BMI ABV UP PARAM F/U: HCPCS | Performed by: FAMILY MEDICINE

## 2024-01-22 PROCEDURE — G8400 PT W/DXA NO RESULTS DOC: HCPCS | Performed by: FAMILY MEDICINE

## 2024-01-22 PROCEDURE — G0439 PPPS, SUBSEQ VISIT: HCPCS | Performed by: FAMILY MEDICINE

## 2024-01-22 PROCEDURE — 1090F PRES/ABSN URINE INCON ASSESS: CPT | Performed by: FAMILY MEDICINE

## 2024-01-22 PROCEDURE — G8427 DOCREV CUR MEDS BY ELIG CLIN: HCPCS | Performed by: FAMILY MEDICINE

## 2024-01-22 RX ORDER — ATORVASTATIN CALCIUM 80 MG/1
80 TABLET, FILM COATED ORAL NIGHTLY
Qty: 90 TABLET | Refills: 0 | Status: SHIPPED | OUTPATIENT
Start: 2024-01-22

## 2024-01-22 RX ORDER — AMLODIPINE BESYLATE 5 MG/1
5 TABLET ORAL EVERY MORNING
Qty: 90 TABLET | Refills: 0 | Status: SHIPPED | OUTPATIENT
Start: 2024-01-22

## 2024-01-22 RX ORDER — LISINOPRIL 20 MG/1
20 TABLET ORAL EVERY MORNING
Qty: 90 TABLET | Refills: 0 | Status: SHIPPED | OUTPATIENT
Start: 2024-01-22

## 2024-01-22 ASSESSMENT — PATIENT HEALTH QUESTIONNAIRE - PHQ9
SUM OF ALL RESPONSES TO PHQ QUESTIONS 1-9: 0
1. LITTLE INTEREST OR PLEASURE IN DOING THINGS: 0
SUM OF ALL RESPONSES TO PHQ9 QUESTIONS 1 & 2: 0
SUM OF ALL RESPONSES TO PHQ QUESTIONS 1-9: 0
2. FEELING DOWN, DEPRESSED OR HOPELESS: 0

## 2024-01-22 ASSESSMENT — LIFESTYLE VARIABLES
HOW MANY STANDARD DRINKS CONTAINING ALCOHOL DO YOU HAVE ON A TYPICAL DAY: PATIENT DOES NOT DRINK
HOW OFTEN DO YOU HAVE A DRINK CONTAINING ALCOHOL: NEVER

## 2024-01-22 NOTE — PROGRESS NOTES
\"Have you been to the ER, urgent care clinic since your last visit?  Hospitalized since your last visit?\"    NO    “Have you seen or consulted any other health care providers outside of [facility] since your last visit?”    NO         
evaluated through a synchronous (real-time) audio-video encounter. The patient (or guardian if applicable) is aware that this is a billable service, which includes applicable co-pays. This Virtual Visit was conducted with patient's (and/or legal guardian's) consent. Patient identification was verified, and a caregiver was present when appropriate.   The patient was located at Home: [address] [place] [id number]  Provider was located at Facility (Appt Dept): [address] [place] [id number]

## 2024-01-22 NOTE — PATIENT INSTRUCTIONS
review.    Other Preventive Recommendations:    A preventive eye exam performed by an eye specialist is recommended every 1-2 years to screen for glaucoma; cataracts, macular degeneration, and other eye disorders.  A preventive dental visit is recommended every 6 months.  Try to get at least 150 minutes of exercise per week or 10,000 steps per day on a pedometer .  Order or download the FREE \"Exercise & Physical Activity: Your Everyday Guide\" from [employer]. Call 1-114.830.9766 or search [employer] online.  You need 8523-6094 mg of calcium and 3433-6064 IU of vitamin D per day. It is possible to meet your calcium requirement with diet alone, but a vitamin D supplement is usually necessary to meet this goal.  When exposed to the sun, use a sunscreen that protects against both UVA and UVB radiation with an SPF of 30 or greater. Reapply every 2 to 3 hours or after sweating, drying off with a towel, or swimming.  Always wear a seat belt when traveling in a car. Always wear a helmet when riding a bicycle or motorcycle.       Starting a Weight Loss Plan: Care Instructions  Overview     If you're thinking about losing weight, it can be hard to know where to start. Your doctor can help you set up a weight loss plan that best meets your needs. You may want to take a class on nutrition or exercise, or you could join a weight loss support group. If you have questions about how to make changes to your eating or exercise habits, ask your doctor about seeing a registered dietitian or an exercise specialist.  It can be a big challenge to lose weight. But you don't have to make huge changes at once. Make small changes, and stick with them. When those changes become habit, add a few more changes.  If you don't think you're ready to make changes right now, try to pick a date in the future. Make an appointment to see your doctor to discuss whether the time is right for you to start a

## 2024-01-25 DIAGNOSIS — I10 PRIMARY HYPERTENSION: ICD-10-CM

## 2024-01-25 DIAGNOSIS — E78.5 HYPERLIPIDEMIA, UNSPECIFIED HYPERLIPIDEMIA TYPE: ICD-10-CM

## 2024-01-25 LAB
ALBUMIN SERPL-MCNC: 3.7 G/DL (ref 3.5–5)
ALBUMIN/GLOB SERPL: 1.2 (ref 1.1–2.2)
ALP SERPL-CCNC: 90 U/L (ref 45–117)
ALT SERPL-CCNC: 32 U/L (ref 12–78)
ANION GAP SERPL CALC-SCNC: 4 MMOL/L (ref 5–15)
AST SERPL-CCNC: 24 U/L (ref 15–37)
BILIRUB SERPL-MCNC: 0.4 MG/DL (ref 0.2–1)
BUN SERPL-MCNC: 15 MG/DL (ref 6–20)
BUN/CREAT SERPL: 19 (ref 12–20)
CALCIUM SERPL-MCNC: 9.8 MG/DL (ref 8.5–10.1)
CHLORIDE SERPL-SCNC: 112 MMOL/L (ref 97–108)
CHOLEST SERPL-MCNC: 167 MG/DL
CO2 SERPL-SCNC: 26 MMOL/L (ref 21–32)
CREAT SERPL-MCNC: 0.78 MG/DL (ref 0.55–1.02)
ERYTHROCYTE [DISTWIDTH] IN BLOOD BY AUTOMATED COUNT: 12.9 % (ref 11.5–14.5)
GLOBULIN SER CALC-MCNC: 3.1 G/DL (ref 2–4)
GLUCOSE SERPL-MCNC: 122 MG/DL (ref 65–100)
HCT VFR BLD AUTO: 36.8 % (ref 35–47)
HDLC SERPL-MCNC: 51 MG/DL
HDLC SERPL: 3.3 (ref 0–5)
HGB BLD-MCNC: 12.2 G/DL (ref 11.5–16)
LDLC SERPL CALC-MCNC: 93.8 MG/DL (ref 0–100)
MCH RBC QN AUTO: 31.8 PG (ref 26–34)
MCHC RBC AUTO-ENTMCNC: 33.2 G/DL (ref 30–36.5)
MCV RBC AUTO: 95.8 FL (ref 80–99)
NRBC # BLD: 0 K/UL (ref 0–0.01)
NRBC BLD-RTO: 0 PER 100 WBC
PLATELET # BLD AUTO: 181 K/UL (ref 150–400)
PMV BLD AUTO: 11.6 FL (ref 8.9–12.9)
POTASSIUM SERPL-SCNC: 4.8 MMOL/L (ref 3.5–5.1)
PROT SERPL-MCNC: 6.8 G/DL (ref 6.4–8.2)
RBC # BLD AUTO: 3.84 M/UL (ref 3.8–5.2)
SODIUM SERPL-SCNC: 142 MMOL/L (ref 136–145)
TRIGL SERPL-MCNC: 111 MG/DL
VLDLC SERPL CALC-MCNC: 22.2 MG/DL
WBC # BLD AUTO: 6 K/UL (ref 3.6–11)

## 2024-01-30 DIAGNOSIS — R73.09 ELEVATED GLUCOSE: Primary | ICD-10-CM

## 2024-01-30 RX ORDER — SALICYLIC ACID 40 %
81 ADHESIVE PATCH, MEDICATED TOPICAL DAILY
Qty: 90 TABLET | Refills: 3 | Status: SHIPPED | OUTPATIENT
Start: 2024-01-30

## 2024-01-31 DIAGNOSIS — R73.09 ELEVATED GLUCOSE: ICD-10-CM

## 2024-02-02 LAB
EST. AVERAGE GLUCOSE BLD GHB EST-MCNC: 114 MG/DL
HBA1C MFR BLD: 5.6 % (ref 4–5.6)

## 2024-02-12 ENCOUNTER — APPOINTMENT (OUTPATIENT)
Facility: HOSPITAL | Age: 84
End: 2024-02-12
Payer: MEDICARE

## 2024-02-12 ENCOUNTER — HOSPITAL ENCOUNTER (EMERGENCY)
Facility: HOSPITAL | Age: 84
Discharge: HOME | End: 2024-02-12
Attending: EMERGENCY MEDICINE
Payer: MEDICARE

## 2024-02-12 ENCOUNTER — TELEPHONE (OUTPATIENT)
Dept: PRIMARY CARE CLINIC | Facility: CLINIC | Age: 84
End: 2024-02-12

## 2024-02-12 VITALS
OXYGEN SATURATION: 98 % | TEMPERATURE: 97.9 F | HEART RATE: 61 BPM | DIASTOLIC BLOOD PRESSURE: 65 MMHG | RESPIRATION RATE: 16 BRPM | SYSTOLIC BLOOD PRESSURE: 179 MMHG

## 2024-02-12 DIAGNOSIS — E86.0 DEHYDRATION: ICD-10-CM

## 2024-02-12 DIAGNOSIS — R53.1 GENERALIZED WEAKNESS: Primary | ICD-10-CM

## 2024-02-12 DIAGNOSIS — N39.0 ACUTE UTI: ICD-10-CM

## 2024-02-12 LAB
ALBUMIN SERPL-MCNC: 3.5 G/DL (ref 3.5–5)
ALBUMIN/GLOB SERPL: 1 (ref 1.1–2.2)
ALP SERPL-CCNC: 86 U/L (ref 45–117)
ALT SERPL-CCNC: 29 U/L (ref 12–78)
ANION GAP SERPL CALC-SCNC: 3 MMOL/L (ref 5–15)
APPEARANCE UR: CLEAR
AST SERPL-CCNC: 21 U/L (ref 15–37)
BACTERIA URNS QL MICRO: NEGATIVE /HPF
BILIRUB SERPL-MCNC: 0.5 MG/DL (ref 0.2–1)
BILIRUB UR QL: NEGATIVE
BUN SERPL-MCNC: 20 MG/DL (ref 6–20)
BUN/CREAT SERPL: 18 (ref 12–20)
CALCIUM SERPL-MCNC: 9.6 MG/DL (ref 8.5–10.1)
CHLORIDE SERPL-SCNC: 112 MMOL/L (ref 97–108)
CO2 SERPL-SCNC: 25 MMOL/L (ref 21–32)
COLOR UR: ABNORMAL
COMMENT:: NORMAL
CREAT SERPL-MCNC: 1.12 MG/DL (ref 0.55–1.02)
EKG ATRIAL RATE: 59 BPM
EKG DIAGNOSIS: NORMAL
EKG P AXIS: 25 DEGREES
EKG P-R INTERVAL: 182 MS
EKG Q-T INTERVAL: 446 MS
EKG QRS DURATION: 114 MS
EKG QTC CALCULATION (BAZETT): 441 MS
EKG R AXIS: 22 DEGREES
EKG T AXIS: 43 DEGREES
EKG VENTRICULAR RATE: 59 BPM
EPITH CASTS URNS QL MICRO: ABNORMAL /LPF
ERYTHROCYTE [DISTWIDTH] IN BLOOD BY AUTOMATED COUNT: 13.3 % (ref 11.5–14.5)
GLOBULIN SER CALC-MCNC: 3.4 G/DL (ref 2–4)
GLUCOSE SERPL-MCNC: 138 MG/DL (ref 65–100)
GLUCOSE UR STRIP.AUTO-MCNC: NEGATIVE MG/DL
HCT VFR BLD AUTO: 35.8 % (ref 35–47)
HGB BLD-MCNC: 12.2 G/DL (ref 11.5–16)
HGB UR QL STRIP: NEGATIVE
HYALINE CASTS URNS QL MICRO: ABNORMAL /LPF (ref 0–5)
KETONES UR QL STRIP.AUTO: NEGATIVE MG/DL
LEUKOCYTE ESTERASE UR QL STRIP.AUTO: ABNORMAL
MCH RBC QN AUTO: 31.9 PG (ref 26–34)
MCHC RBC AUTO-ENTMCNC: 34.1 G/DL (ref 30–36.5)
MCV RBC AUTO: 93.5 FL (ref 80–99)
NITRITE UR QL STRIP.AUTO: NEGATIVE
NRBC # BLD: 0 K/UL (ref 0–0.01)
NRBC BLD-RTO: 0 PER 100 WBC
PH UR STRIP: 7 (ref 5–8)
PLATELET # BLD AUTO: 197 K/UL (ref 150–400)
PMV BLD AUTO: 11.9 FL (ref 8.9–12.9)
POTASSIUM SERPL-SCNC: 4.5 MMOL/L (ref 3.5–5.1)
PROT SERPL-MCNC: 6.9 G/DL (ref 6.4–8.2)
PROT UR STRIP-MCNC: NEGATIVE MG/DL
RBC # BLD AUTO: 3.83 M/UL (ref 3.8–5.2)
RBC #/AREA URNS HPF: ABNORMAL /HPF (ref 0–5)
SODIUM SERPL-SCNC: 140 MMOL/L (ref 136–145)
SP GR UR REFRACTOMETRY: 1.02 (ref 1–1.03)
SPECIMEN HOLD: NORMAL
SPECIMEN HOLD: NORMAL
TROPONIN I SERPL HS-MCNC: 6 NG/L (ref 0–51)
URINE CULTURE IF INDICATED: ABNORMAL
UROBILINOGEN UR QL STRIP.AUTO: 1 EU/DL (ref 0.2–1)
WBC # BLD AUTO: 7.8 K/UL (ref 3.6–11)
WBC URNS QL MICRO: ABNORMAL /HPF (ref 0–4)

## 2024-02-12 PROCEDURE — 84484 ASSAY OF TROPONIN QUANT: CPT

## 2024-02-12 PROCEDURE — 6360000002 HC RX W HCPCS: Performed by: EMERGENCY MEDICINE

## 2024-02-12 PROCEDURE — 80053 COMPREHEN METABOLIC PANEL: CPT

## 2024-02-12 PROCEDURE — 87086 URINE CULTURE/COLONY COUNT: CPT

## 2024-02-12 PROCEDURE — 93005 ELECTROCARDIOGRAM TRACING: CPT | Performed by: EMERGENCY MEDICINE

## 2024-02-12 PROCEDURE — 81001 URINALYSIS AUTO W/SCOPE: CPT

## 2024-02-12 PROCEDURE — 99285 EMERGENCY DEPT VISIT HI MDM: CPT

## 2024-02-12 PROCEDURE — 96374 THER/PROPH/DIAG INJ IV PUSH: CPT

## 2024-02-12 PROCEDURE — 96361 HYDRATE IV INFUSION ADD-ON: CPT

## 2024-02-12 PROCEDURE — 85027 COMPLETE CBC AUTOMATED: CPT

## 2024-02-12 PROCEDURE — 70450 CT HEAD/BRAIN W/O DYE: CPT

## 2024-02-12 PROCEDURE — 36415 COLL VENOUS BLD VENIPUNCTURE: CPT

## 2024-02-12 PROCEDURE — 2580000003 HC RX 258: Performed by: EMERGENCY MEDICINE

## 2024-02-12 PROCEDURE — 71045 X-RAY EXAM CHEST 1 VIEW: CPT

## 2024-02-12 RX ORDER — ACETAMINOPHEN 325 MG/1
650 TABLET ORAL
Status: DISCONTINUED | OUTPATIENT
Start: 2024-02-12 | End: 2024-02-12 | Stop reason: HOSPADM

## 2024-02-12 RX ORDER — ONDANSETRON 4 MG/1
4 TABLET, FILM COATED ORAL EVERY 8 HOURS PRN
Qty: 10 TABLET | Refills: 0 | Status: SHIPPED | OUTPATIENT
Start: 2024-02-12

## 2024-02-12 RX ORDER — ONDANSETRON 2 MG/ML
4 INJECTION INTRAMUSCULAR; INTRAVENOUS ONCE
Status: COMPLETED | OUTPATIENT
Start: 2024-02-12 | End: 2024-02-12

## 2024-02-12 RX ORDER — CEPHALEXIN 500 MG/1
500 CAPSULE ORAL 2 TIMES DAILY
Qty: 14 CAPSULE | Refills: 0 | Status: SHIPPED | OUTPATIENT
Start: 2024-02-12 | End: 2024-02-19

## 2024-02-12 RX ORDER — 0.9 % SODIUM CHLORIDE 0.9 %
500 INTRAVENOUS SOLUTION INTRAVENOUS ONCE
Status: COMPLETED | OUTPATIENT
Start: 2024-02-12 | End: 2024-02-12

## 2024-02-12 RX ADMIN — SODIUM CHLORIDE 500 ML: 9 INJECTION, SOLUTION INTRAVENOUS at 09:09

## 2024-02-12 RX ADMIN — ONDANSETRON 4 MG: 2 INJECTION INTRAMUSCULAR; INTRAVENOUS at 09:07

## 2024-02-12 RX ADMIN — SODIUM CHLORIDE 500 ML: 9 INJECTION, SOLUTION INTRAVENOUS at 12:25

## 2024-02-12 NOTE — TELEPHONE ENCOUNTER
----- Message from Patel Lowery sent at 2/12/2024  2:06 PM EST -----  Subject: Appointment Request    Reason for Call: Established Patient Appointment needed: Routine ED Follow   Up Visit    QUESTIONS    Reason for appointment request? No appointments available during search     Additional Information for Provider? 02/12 DANITZA RODRIGUEZ for possible UTI and   dehydration   ---------------------------------------------------------------------------  --------------  CALL BACK INFO  [phone]; OK to leave message on voicemail  ---------------------------------------------------------------------------  --------------  SCRIPT ANSWERS

## 2024-02-12 NOTE — ED NOTES
Patient stated verbal understanding of dc paperwork and was wheeled to front of ed entrance without incident.

## 2024-02-12 NOTE — ED NOTES
Discharge paperwork reviewed with patient and family. Patient and family verbalized understanding.

## 2024-02-12 NOTE — DISCHARGE INSTRUCTIONS
Follow-up with your primary care doctor in have your labs rechecked.  He did have some evidence for mild dehydration and possible urinary tract infection which is why I am treating with the antibiotic.  Please drink lots of fluids to help with your symptoms.

## 2024-02-12 NOTE — ED NOTES
Family requesting pt's ordered tylenol be delayed until 10am, due to having received tylenol this morning around 4 am.

## 2024-02-12 NOTE — ED TRIAGE NOTES
Pt arrives [facility] from home for c/o generalized weakness and nauseous . EMS found stroke assessment to be negative. Pt has hx of stroke 10/2022 and a recent vascular surgery, some dementia  Pt reports skipping her BP med this morning.  Pt ambulatory with assistance.

## 2024-02-12 NOTE — ED PROVIDER NOTES
[facility] EMERGENCY DEP  EMERGENCY DEPARTMENT ENCOUNTER      Pt Name: Joellen Souza  MRN: [id number]  Birthdate 1940  Date of evaluation: 2/12/2024  Provider: Marshall Armstrong MD    CHIEF COMPLAINT       Chief Complaint   Patient presents with    Fatigue         HISTORY OF PRESENT ILLNESS    This is an 83-year-old female with past ministry coronary disease, dementia noted in the chart, hypertension history of prior CVA.  Patient is on Eliquis per chart review.  Patient came to the ER for evaluation of generalized weakness with associated nausea and headache.  Patient reports onset of symptoms this morning which is feeling fatigued.  She does report burning pain in bilateral feet.  Aside from that does have a headache.  Some associated nausea but no vomiting, no abdominal pain, no fevers or chills no cough congestion, no chest pain or shortness of breath.  Patient did report using the bathroom this morning and no dysuria.  Denies any diarrhea.            Review of External Medical Records:     Nursing Notes were reviewed.    REVIEW OF SYSTEMS       Review of Systems   Constitutional:  Positive for fatigue. Negative for fever.   Respiratory:  Negative for shortness of breath.    Cardiovascular:  Negative for chest pain.   Gastrointestinal:  Positive for nausea. Negative for abdominal pain and vomiting.   Neurological:  Positive for headaches. Negative for weakness.       Except as noted above the remainder of the review of systems was reviewed and negative.       PAST MEDICAL HISTORY     Past Medical History:   Diagnosis Date    Breast CA (HCC) 2000    LEFT    CAD (coronary artery disease)     Cancer (HCC)     Dementia (HCC) 2023    Diabetes (HCC)     PATIENT DENIES AS OF 4/5/23    Hypertension     Psychiatric disorder     Radiation therapy complication     2008    Stroke (HCC) 10/2022    NO RESIDUAL         SURGICAL HISTORY       Past Surgical History:   Procedure Laterality Date    BREAST BIOPSY Left     2008

## 2024-02-13 LAB
BACTERIA SPEC CULT: NORMAL
SERVICE CMNT-IMP: NORMAL

## 2024-02-27 ENCOUNTER — HOSPITAL ENCOUNTER (OUTPATIENT)
Age: 84
Discharge: HOME | End: 2024-03-01
Payer: MEDICARE

## 2024-02-27 VITALS — HEIGHT: 60 IN | BODY MASS INDEX: 30.63 KG/M2 | WEIGHT: 156 LBS

## 2024-02-27 DIAGNOSIS — Z78.0 POST-MENOPAUSAL: ICD-10-CM

## 2024-02-27 DIAGNOSIS — Z12.31 ENCOUNTER FOR SCREENING MAMMOGRAM FOR MALIGNANT NEOPLASM OF BREAST: ICD-10-CM

## 2024-02-27 PROCEDURE — 77080 DXA BONE DENSITY AXIAL: CPT

## 2024-02-27 PROCEDURE — 77063 BREAST TOMOSYNTHESIS BI: CPT

## 2024-02-29 ENCOUNTER — OFFICE VISIT (OUTPATIENT)
Dept: PRIMARY CARE CLINIC | Facility: CLINIC | Age: 84
End: 2024-02-29
Payer: MEDICARE

## 2024-02-29 VITALS
TEMPERATURE: 96.8 F | WEIGHT: 163 LBS | DIASTOLIC BLOOD PRESSURE: 53 MMHG | HEIGHT: 60 IN | RESPIRATION RATE: 16 BRPM | OXYGEN SATURATION: 99 % | BODY MASS INDEX: 32 KG/M2 | SYSTOLIC BLOOD PRESSURE: 175 MMHG | HEART RATE: 65 BPM

## 2024-02-29 DIAGNOSIS — I10 PRIMARY HYPERTENSION: ICD-10-CM

## 2024-02-29 DIAGNOSIS — R60.9 EDEMA, UNSPECIFIED TYPE: Primary | ICD-10-CM

## 2024-02-29 DIAGNOSIS — R60.9 EDEMA, UNSPECIFIED TYPE: ICD-10-CM

## 2024-02-29 DIAGNOSIS — G30.1 ALZHEIMER'S DISEASE WITH LATE ONSET (CODE) (HCC): ICD-10-CM

## 2024-02-29 LAB
ALBUMIN SERPL-MCNC: 3.6 G/DL (ref 3.5–5)
ALBUMIN/GLOB SERPL: 1.1 (ref 1.1–2.2)
ALP SERPL-CCNC: 102 U/L (ref 45–117)
ALT SERPL-CCNC: 39 U/L (ref 12–78)
ANION GAP SERPL CALC-SCNC: 4 MMOL/L (ref 5–15)
AST SERPL-CCNC: 30 U/L (ref 15–37)
BILIRUB SERPL-MCNC: 0.4 MG/DL (ref 0.2–1)
BUN SERPL-MCNC: 20 MG/DL (ref 6–20)
BUN/CREAT SERPL: 21 (ref 12–20)
CALCIUM SERPL-MCNC: 9.3 MG/DL (ref 8.5–10.1)
CHLORIDE SERPL-SCNC: 110 MMOL/L (ref 97–108)
CO2 SERPL-SCNC: 26 MMOL/L (ref 21–32)
CREAT SERPL-MCNC: 0.95 MG/DL (ref 0.55–1.02)
GLOBULIN SER CALC-MCNC: 3.3 G/DL (ref 2–4)
GLUCOSE SERPL-MCNC: 143 MG/DL (ref 65–100)
POTASSIUM SERPL-SCNC: 4.1 MMOL/L (ref 3.5–5.1)
PROT SERPL-MCNC: 6.9 G/DL (ref 6.4–8.2)
SODIUM SERPL-SCNC: 140 MMOL/L (ref 136–145)
TSH SERPL DL<=0.05 MIU/L-ACNC: 2.78 UIU/ML (ref 0.36–3.74)

## 2024-02-29 PROCEDURE — 1123F ACP DISCUSS/DSCN MKR DOCD: CPT | Performed by: FAMILY MEDICINE

## 2024-02-29 PROCEDURE — G8400 PT W/DXA NO RESULTS DOC: HCPCS | Performed by: FAMILY MEDICINE

## 2024-02-29 PROCEDURE — 1036F TOBACCO NON-USER: CPT | Performed by: FAMILY MEDICINE

## 2024-02-29 PROCEDURE — 3078F DIAST BP <80 MM HG: CPT | Performed by: FAMILY MEDICINE

## 2024-02-29 PROCEDURE — 99214 OFFICE O/P EST MOD 30 MIN: CPT | Performed by: FAMILY MEDICINE

## 2024-02-29 PROCEDURE — G8484 FLU IMMUNIZE NO ADMIN: HCPCS | Performed by: FAMILY MEDICINE

## 2024-02-29 PROCEDURE — G8417 CALC BMI ABV UP PARAM F/U: HCPCS | Performed by: FAMILY MEDICINE

## 2024-02-29 PROCEDURE — 1090F PRES/ABSN URINE INCON ASSESS: CPT | Performed by: FAMILY MEDICINE

## 2024-02-29 PROCEDURE — G8427 DOCREV CUR MEDS BY ELIG CLIN: HCPCS | Performed by: FAMILY MEDICINE

## 2024-02-29 PROCEDURE — 3077F SYST BP >= 140 MM HG: CPT | Performed by: FAMILY MEDICINE

## 2024-02-29 ASSESSMENT — PATIENT HEALTH QUESTIONNAIRE - PHQ9
SUM OF ALL RESPONSES TO PHQ QUESTIONS 1-9: 0
1. LITTLE INTEREST OR PLEASURE IN DOING THINGS: 0
SUM OF ALL RESPONSES TO PHQ QUESTIONS 1-9: 0
SUM OF ALL RESPONSES TO PHQ QUESTIONS 1-9: 0
SUM OF ALL RESPONSES TO PHQ9 QUESTIONS 1 & 2: 0
SUM OF ALL RESPONSES TO PHQ QUESTIONS 1-9: 0
2. FEELING DOWN, DEPRESSED OR HOPELESS: 0

## 2024-02-29 NOTE — PROGRESS NOTES
Subjective  Joellen Souza is an 83 y.o. female who presents for follow up.     Pmhx : dementia, hx cva, hx HLD, HTN, IGT.   ER visit 2/12/24 due to pains in both lower legs and generalized weakness.     Head CT negative.   U cx neg.   Trop neg.   Cmp with gft 49, otherwise no acute results.    Since discharge to home she's been stable. Denies acute illness or new symptoms. Denies chest pain, dyspnea, or any signs of bleeding.     Allergies - reviewed:   No Known Allergies      Medications - reviewed:   Current Outpatient Medications   Medication Sig    ondansetron (ZOFRAN) 4 MG tablet Take 1 tablet by mouth every 8 hours as needed for Nausea or Vomiting    aspirin ([facility] ASPIRIN LOW DOSE) 81 MG EC tablet TAKE 1 TABLET BY MOUTH EVERY DAY    atorvastatin (LIPITOR) 80 MG tablet Take 1 tablet by mouth nightly Please complete labs for refills.    lisinopril (PRINIVIL;ZESTRIL) 20 MG tablet Take 1 tablet by mouth every morning    amLODIPine (NORVASC) 5 MG tablet Take 1 tablet by mouth every morning    memantine (NAMENDA) 10 MG tablet Take 1 tablet by mouth 2 times daily    donepezil (ARICEPT) 10 MG tablet TAKE 1 TABLET BY MOUTH EVERY DAY AT NIGHT    apixaban (ELIQUIS) 5 MG TABS tablet TAKE 1 TABLET BY MOUTH TWICE A DAY     No current facility-administered medications for this visit.         Past Medical History - reviewed:  Past Medical History:   Diagnosis Date    Breast CA (HCC) 2000    LEFT    CAD (coronary artery disease)     Cancer (HCC)     Dementia (HCC) 2023    Diabetes (HCC)     PATIENT DENIES AS OF 4/5/23    History of therapeutic radiation     2008 breast CA    Hypertension     Psychiatric disorder     Radiation therapy complication     2008    Stroke (HCC) 10/2022    NO RESIDUAL       ROS  CONSTITUTIONAL: Denies fever, chills, unintentional weight loss.  CARDIOVASCULAR: Denies chest pain, orthopnea, PND.  RESPIRATORY: Denies dyspnea, wheezing, hemoptysis.  GI: Denies abdominal pain, diarrhea, constipation,

## 2024-02-29 NOTE — PROGRESS NOTES
\"Have you been to the ER, urgent care clinic since your last visit?  Hospitalized since your last visit?\"    Pt was seen in the [place] ED on 02/12/24 for Weakness     “Have you seen or consulted any other health care providers outside of [facility] since your last visit?”    NO

## 2024-04-15 DIAGNOSIS — E78.5 HYPERLIPIDEMIA, UNSPECIFIED HYPERLIPIDEMIA TYPE: ICD-10-CM

## 2024-04-15 DIAGNOSIS — I10 PRIMARY HYPERTENSION: ICD-10-CM

## 2024-04-17 DIAGNOSIS — F03.B0: ICD-10-CM

## 2024-04-17 RX ORDER — DONEPEZIL HYDROCHLORIDE 10 MG/1
10 TABLET, FILM COATED ORAL
Qty: 90 TABLET | Refills: 1 | OUTPATIENT
Start: 2024-04-17

## 2024-04-17 NOTE — PROGRESS NOTES
Patient requested a refill on donepezil.  She was prescribed donepezil 10 mg at bedtime.  However, per chart review, patient only takes 5 mg.  Will reach out to her via phone to make sure the dosage of the donepezil.

## 2024-04-22 ENCOUNTER — TELEPHONE (OUTPATIENT)
Age: 84
End: 2024-04-22

## 2024-04-22 DIAGNOSIS — F03.B0: ICD-10-CM

## 2024-04-22 RX ORDER — DONEPEZIL HYDROCHLORIDE 10 MG/1
10 TABLET, FILM COATED ORAL NIGHTLY
Qty: 90 TABLET | Refills: 1 | Status: SHIPPED | OUTPATIENT
Start: 2024-04-22

## 2024-04-22 NOTE — TELEPHONE ENCOUNTER
Patients daughter Michelle called to clarify the name of the medication that would need refills for donepezil (ARICEPT) 10 MG tablet daughter can be reached at [phone]

## 2024-04-28 RX ORDER — ATORVASTATIN CALCIUM 80 MG/1
80 TABLET, FILM COATED ORAL NIGHTLY
Qty: 90 TABLET | Refills: 0 | Status: SHIPPED | OUTPATIENT
Start: 2024-04-28

## 2024-04-28 RX ORDER — AMLODIPINE BESYLATE 5 MG/1
5 TABLET ORAL EVERY MORNING
Qty: 90 TABLET | Refills: 0 | Status: SHIPPED | OUTPATIENT
Start: 2024-04-28

## 2024-05-02 ENCOUNTER — OFFICE VISIT (OUTPATIENT)
Dept: PRIMARY CARE CLINIC | Facility: CLINIC | Age: 84
End: 2024-05-02
Payer: MEDICARE

## 2024-05-02 VITALS
HEART RATE: 62 BPM | HEIGHT: 60 IN | DIASTOLIC BLOOD PRESSURE: 62 MMHG | BODY MASS INDEX: 32 KG/M2 | RESPIRATION RATE: 16 BRPM | WEIGHT: 163 LBS | SYSTOLIC BLOOD PRESSURE: 180 MMHG | OXYGEN SATURATION: 98 % | TEMPERATURE: 97.8 F

## 2024-05-02 DIAGNOSIS — E55.9 VITAMIN D DEFICIENCY: ICD-10-CM

## 2024-05-02 DIAGNOSIS — I65.23 BILATERAL CAROTID ARTERY STENOSIS: ICD-10-CM

## 2024-05-02 DIAGNOSIS — M81.0 AGE RELATED OSTEOPOROSIS, UNSPECIFIED PATHOLOGICAL FRACTURE PRESENCE: Primary | ICD-10-CM

## 2024-05-02 DIAGNOSIS — R01.1 HEART MURMUR: ICD-10-CM

## 2024-05-02 DIAGNOSIS — I10 PRIMARY HYPERTENSION: ICD-10-CM

## 2024-05-02 DIAGNOSIS — I99.8 FLUCTUATING BLOOD PRESSURE: ICD-10-CM

## 2024-05-02 LAB — 25(OH)D3 SERPL-MCNC: 28.8 NG/ML (ref 30–100)

## 2024-05-02 PROCEDURE — 1123F ACP DISCUSS/DSCN MKR DOCD: CPT | Performed by: INTERNAL MEDICINE

## 2024-05-02 PROCEDURE — 1036F TOBACCO NON-USER: CPT | Performed by: INTERNAL MEDICINE

## 2024-05-02 PROCEDURE — 99214 OFFICE O/P EST MOD 30 MIN: CPT | Performed by: INTERNAL MEDICINE

## 2024-05-02 PROCEDURE — G8427 DOCREV CUR MEDS BY ELIG CLIN: HCPCS | Performed by: INTERNAL MEDICINE

## 2024-05-02 PROCEDURE — 3077F SYST BP >= 140 MM HG: CPT | Performed by: INTERNAL MEDICINE

## 2024-05-02 PROCEDURE — 3078F DIAST BP <80 MM HG: CPT | Performed by: INTERNAL MEDICINE

## 2024-05-02 PROCEDURE — G8417 CALC BMI ABV UP PARAM F/U: HCPCS | Performed by: INTERNAL MEDICINE

## 2024-05-02 PROCEDURE — G8399 PT W/DXA RESULTS DOCUMENT: HCPCS | Performed by: INTERNAL MEDICINE

## 2024-05-02 PROCEDURE — 1090F PRES/ABSN URINE INCON ASSESS: CPT | Performed by: INTERNAL MEDICINE

## 2024-05-02 RX ORDER — ALENDRONATE SODIUM 70 MG/1
70 TABLET ORAL
Qty: 13 TABLET | Refills: 0 | Status: SHIPPED | OUTPATIENT
Start: 2024-05-02

## 2024-05-02 NOTE — PROGRESS NOTES
\"Have you been to the ER, urgent care clinic since your last visit?  Hospitalized since your last visit?\"    NO    “Have you seen or consulted any other health care providers outside of [facility] since your last visit?”    NO            Click Here for Release of Records Request

## 2024-05-02 NOTE — PROGRESS NOTES
Joellen Souza is a 84 y.o. female and presents with     Chief Complaint   Patient presents with    Follow-up     Discuss bone density results     Loss of Consciousness     Patient had an episode of syncope on April 1st. Has been fine ever since       History of Present Illness  The patient presents for evaluation of multiple medical concerns. She is accompanied by her daughter.    The patient underwent a bone density test in 03/2024, which revealed osteoporosis in the left hand.    The patient's daughter reports that in 2023, the patient experienced a stroke and a urinary tract infection (UTI) simultaneously. Post-hospitalization, the patient's blood pressure readings have consistently been elevated, with systolic readings ranging from 180 to 179. The patient also reports increased leg swelling. The patient is currently on a regimen of amlodipine, administered in the morning.    The patient's daughter reports that the patient has been experiencing syncopal events, the cause of which remains undetermined. The patient's most recent episode occurred a few weeks ago, and the preceding episode was characterized by seizure-like activity. The patient's symptoms are characterized by impaired auditory acuity, redirection, and occasional vomiting. The patient does not recall any of these episodes. The last episode occurred on 04/01/2024, during which the patient was unable to answer her name, felt weak, and started vomiting. Her blood pressure was normal by the time EMS arrived, and she was sent to the hospital. Despite multiple hospital visits, no definitive diagnosis was found. The patient has a history of stroke and has undergone two cerebrovascular accidents. Her cardiologist is Dr. Funes, who recommended spironolactone alone, but the daughter did not deemed it appropriate at that time. It typically takes the patient 5 to 10 minutes to recalculate her blood pressure before resuming activity. A CTA of the head and neck

## 2024-05-03 ENCOUNTER — HOSPITAL ENCOUNTER (EMERGENCY)
Facility: HOSPITAL | Age: 84
Discharge: HOME | End: 2024-05-03
Attending: EMERGENCY MEDICINE
Payer: MEDICARE

## 2024-05-03 VITALS
SYSTOLIC BLOOD PRESSURE: 185 MMHG | HEIGHT: 62 IN | HEART RATE: 60 BPM | OXYGEN SATURATION: 100 % | TEMPERATURE: 98.3 F | WEIGHT: 166.01 LBS | RESPIRATION RATE: 18 BRPM | BODY MASS INDEX: 30.55 KG/M2 | DIASTOLIC BLOOD PRESSURE: 81 MMHG

## 2024-05-03 DIAGNOSIS — R04.0 EPISTAXIS: Primary | ICD-10-CM

## 2024-05-03 PROCEDURE — 99283 EMERGENCY DEPT VISIT LOW MDM: CPT

## 2024-05-03 PROCEDURE — 30901 CONTROL OF NOSEBLEED: CPT

## 2024-05-03 PROCEDURE — 6370000000 HC RX 637 (ALT 250 FOR IP): Performed by: EMERGENCY MEDICINE

## 2024-05-03 RX ORDER — AMLODIPINE BESYLATE 5 MG/1
10 TABLET ORAL
Status: COMPLETED | OUTPATIENT
Start: 2024-05-03 | End: 2024-05-03

## 2024-05-03 RX ADMIN — AMLODIPINE BESYLATE 10 MG: 5 TABLET ORAL at 21:20

## 2024-05-03 ASSESSMENT — PAIN - FUNCTIONAL ASSESSMENT: PAIN_FUNCTIONAL_ASSESSMENT: NONE - DENIES PAIN

## 2024-05-04 NOTE — ED NOTES
Patient left ED in no acute distress, alert and oriented x4. Patient was encourage to come back if symptoms get worse. Patient was provided with discharge instructions . All questions were answered.

## 2024-05-04 NOTE — ED TRIAGE NOTES
[facility] Room Nursing Note        Patient Name: Joellen Souza      : 1940             MRN: [id number]      Chief Complaint:  Epistaxis      Admit Diagnosis: No admission diagnoses are documented for this encounter.      Admitting Provider: No admitting provider for patient encounter.      Surgery: * No surgery found *           Patient arrived to the ER ambulatory from home with complaints of a Right Nare Nose Bleed that started this morning. Pt also states having High Blood Pressure. Pt is on Eloquis & ASA.         Lines:        Signed by: Beto Busby RN, KENZIE, BSN, CMSRN                                              5/3/2024 at 8:03 PM

## 2024-05-04 NOTE — ED PROVIDER NOTES
MD  Authorized by: King Castillo MD    Consent:     Consent obtained:  Verbal    Consent given by:  Patient    Risks discussed:  Pain, bleeding and nasal injury    Alternatives discussed:  Referral  [place] protocol:     Procedure explained and questions answered to patient or proxy's satisfaction: yes      Patient identity confirmed:  Provided demographic data  Anesthesia:     Anesthesia method:  None  Procedure details:     Treatment site:  R anterior    Repair method: Rhino Rocket.    Treatment complexity:  Limited    Treatment episode: initial    Post-procedure details:     Post-procedure assessment: No change as no active bleeding present.    Patient tolerance of procedure: Procedure successful but patient later requested removal.          (Please note that portions of this note were completed with a voice recognition program.  Efforts were made to edit the dictations but occasionally words are mis-transcribed.)    King Castillo MD (electronically signed)  Emergency Attending Physician              King Castillo MD  05/04/24 [id number]

## 2024-05-21 ENCOUNTER — HOSPITAL ENCOUNTER (OUTPATIENT)
Facility: HOSPITAL | Age: 84
Discharge: HOME | End: 2024-05-23
Payer: MEDICARE

## 2024-05-21 VITALS
HEIGHT: 61 IN | DIASTOLIC BLOOD PRESSURE: 70 MMHG | SYSTOLIC BLOOD PRESSURE: 178 MMHG | WEIGHT: 166.01 LBS | BODY MASS INDEX: 31.34 KG/M2

## 2024-05-21 DIAGNOSIS — I65.23 BILATERAL CAROTID ARTERY STENOSIS: ICD-10-CM

## 2024-05-21 DIAGNOSIS — I10 PRIMARY HYPERTENSION: ICD-10-CM

## 2024-05-21 DIAGNOSIS — R01.1 HEART MURMUR: ICD-10-CM

## 2024-05-21 LAB
ECHO AV AREA PEAK VELOCITY: 2.6 CM2
ECHO AV AREA/BSA PEAK VELOCITY: 1.5 CM2/M2
ECHO AV PEAK GRADIENT: 7 MMHG
ECHO AV PEAK VELOCITY: 1.3 M/S
ECHO AV VELOCITY RATIO: 0.69
ECHO BSA: 1.81 M2
ECHO EST RA PRESSURE: 3 MMHG
ECHO LV E' LATERAL VELOCITY: 8 CM/S
ECHO LV E' SEPTAL VELOCITY: 6 CM/S
ECHO LV EDV A2C: 55 ML
ECHO LV EDV A4C: 85 ML
ECHO LV EDV BP: 75 ML (ref 56–104)
ECHO LV EDV INDEX A4C: 48 ML/M2
ECHO LV EDV INDEX BP: 43 ML/M2
ECHO LV EDV NDEX A2C: 31 ML/M2
ECHO LV EJECTION FRACTION A2C: 3 %
ECHO LV EJECTION FRACTION A4C: 65 %
ECHO LV EJECTION FRACTION BIPLANE: 46 % (ref 55–100)
ECHO LV ESV A2C: 53 ML
ECHO LV ESV A4C: 30 ML
ECHO LV ESV BP: 40 ML (ref 19–49)
ECHO LV ESV INDEX A2C: 30 ML/M2
ECHO LV ESV INDEX A4C: 17 ML/M2
ECHO LV ESV INDEX BP: 23 ML/M2
ECHO LVOT AREA: 3.8 CM2
ECHO LVOT DIAM: 2.2 CM
ECHO LVOT PEAK GRADIENT: 3 MMHG
ECHO LVOT PEAK VELOCITY: 0.9 M/S
ECHO MV A VELOCITY: 0.85 M/S
ECHO MV E VELOCITY: 0.68 M/S
ECHO MV E/A RATIO: 0.8
ECHO MV E/E' LATERAL: 8.5
ECHO MV E/E' RATIO (AVERAGED): 9.92
ECHO MV E/E' SEPTAL: 11.33
ECHO MV MAX VELOCITY: 1 M/S
ECHO MV MEAN GRADIENT: 2 MMHG
ECHO MV MEAN VELOCITY: 0.6 M/S
ECHO MV PEAK GRADIENT: 4 MMHG
ECHO MV REGURGITANT PEAK GRADIENT: 4 MMHG
ECHO MV REGURGITANT PEAK VELOCITY: 1 M/S
ECHO MV VTI: 32.7 CM
ECHO PV MAX VELOCITY: 1.2 M/S
ECHO PV PEAK GRADIENT: 5 MMHG
ECHO RIGHT VENTRICULAR SYSTOLIC PRESSURE (RVSP): 15 MMHG
ECHO RV FREE WALL PEAK S': 16 CM/S
ECHO RV TAPSE: 1.6 CM (ref 1.7–?)
ECHO TV REGURGITANT MAX VELOCITY: 1.72 M/S
ECHO TV REGURGITANT PEAK GRADIENT: 12 MMHG
SPECIMEN STATUS REPORT: NORMAL

## 2024-05-21 PROCEDURE — 93306 TTE W/DOPPLER COMPLETE: CPT

## 2024-05-21 PROCEDURE — 93880 EXTRACRANIAL BILAT STUDY: CPT

## 2024-05-22 LAB
DOPAMINE 24H UR-MRATE: 334 UG/24 HR (ref 0–510)
DOPAMINE UR-MCNC: 167 UG/L
EPINEPH 24H UR-MRATE: <6 UG/24 HR (ref 0–20)
EPINEPH UR-MCNC: <3 UG/L
NOREPINEPH 24H UR-MRATE: 56 UG/24 HR (ref 0–135)
NOREPINEPH UR-MCNC: 28 UG/L
VMA 24H UR-MRATE: 5.8 MG/24 HR (ref 0–7.5)
VMA UR-MCNC: 2.9 MG/L

## 2024-05-23 LAB
ECHO BSA: 1.81 M2
VAS LEFT CCA DIST EDV: 15.5 CM/S
VAS LEFT CCA DIST PSV: 89.2 CM/S
VAS LEFT CCA PROX EDV: 13.1 CM/S
VAS LEFT CCA PROX PSV: 68.4 CM/S
VAS LEFT ECA EDV: 11.8 CM/S
VAS LEFT ECA PSV: 277 CM/S
VAS LEFT ICA DIST EDV: 21.7 CM/S
VAS LEFT ICA DIST PSV: 106.4 CM/S
VAS LEFT ICA MID EDV: 25 CM/S
VAS LEFT ICA MID PSV: 180.5 CM/S
VAS LEFT ICA PROX EDV: 35.4 CM/S
VAS LEFT ICA PROX PSV: 183.1 CM/S
VAS LEFT ICA/CCA PSV: 2.1 NO UNITS
VAS LEFT VERTEBRAL EDV: 13.1 CM/S
VAS LEFT VERTEBRAL PSV: 79.4 CM/S
VAS RIGHT CCA DIST EDV: 13.5 CM/S
VAS RIGHT CCA DIST PSV: 83.4 CM/S
VAS RIGHT CCA PROX EDV: 8.8 CM/S
VAS RIGHT CCA PROX PSV: 51.3 CM/S
VAS RIGHT ECA EDV: 47.8 CM/S
VAS RIGHT ECA PSV: 628.7 CM/S
VAS RIGHT ICA DIST EDV: 14.3 CM/S
VAS RIGHT ICA DIST PSV: 110.1 CM/S
VAS RIGHT ICA MID EDV: 27.6 CM/S
VAS RIGHT ICA MID PSV: 193.4 CM/S
VAS RIGHT ICA PROX EDV: 30.2 CM/S
VAS RIGHT ICA PROX PSV: 180.5 CM/S
VAS RIGHT ICA/CCA PSV: 2.3 NO UNITS
VAS RIGHT VERTEBRAL EDV: 10.6 CM/S
VAS RIGHT VERTEBRAL PSV: 77 CM/S

## 2024-07-03 ENCOUNTER — TELEPHONE (OUTPATIENT)
Dept: PRIMARY CARE CLINIC | Facility: CLINIC | Age: 84
End: 2024-07-03

## 2024-07-03 NOTE — TELEPHONE ENCOUNTER
Patient's daughter called stating that her mother has a history of breast cancer and lymph node removal on the left side.  Her mother called her to inform her that she has a hard knot on her left side under her armpit that she needs to have checked out.  I let her know there wasn't any availability.  She wanted a msg sent to Dr. Cabrera to let her know.  The daughter said her mother preferred to see Dr. Cabrera but if they have to go to urgent care they would.  The daughter would like a call back at #[phone].  If she's not available to get the phone (she has a meeting from 1pm-2:30pm) please leave a detailed msg and she will get back to you.

## 2024-07-05 ENCOUNTER — OFFICE VISIT (OUTPATIENT)
Dept: PRIMARY CARE CLINIC | Facility: CLINIC | Age: 84
End: 2024-07-05
Payer: MEDICARE

## 2024-07-05 VITALS
HEART RATE: 57 BPM | DIASTOLIC BLOOD PRESSURE: 68 MMHG | WEIGHT: 166 LBS | BODY MASS INDEX: 30.55 KG/M2 | HEIGHT: 62 IN | RESPIRATION RATE: 19 BRPM | SYSTOLIC BLOOD PRESSURE: 177 MMHG | OXYGEN SATURATION: 99 %

## 2024-07-05 DIAGNOSIS — I10 UNCONTROLLED HYPERTENSION: ICD-10-CM

## 2024-07-05 DIAGNOSIS — Z85.3 HISTORY OF BREAST CANCER: Primary | ICD-10-CM

## 2024-07-05 DIAGNOSIS — L98.9 SKIN LESION: ICD-10-CM

## 2024-07-05 PROCEDURE — 1036F TOBACCO NON-USER: CPT | Performed by: FAMILY MEDICINE

## 2024-07-05 PROCEDURE — G8427 DOCREV CUR MEDS BY ELIG CLIN: HCPCS | Performed by: FAMILY MEDICINE

## 2024-07-05 PROCEDURE — 3077F SYST BP >= 140 MM HG: CPT | Performed by: FAMILY MEDICINE

## 2024-07-05 PROCEDURE — 1123F ACP DISCUSS/DSCN MKR DOCD: CPT | Performed by: FAMILY MEDICINE

## 2024-07-05 PROCEDURE — G8399 PT W/DXA RESULTS DOCUMENT: HCPCS | Performed by: FAMILY MEDICINE

## 2024-07-05 PROCEDURE — 1090F PRES/ABSN URINE INCON ASSESS: CPT | Performed by: FAMILY MEDICINE

## 2024-07-05 PROCEDURE — 99214 OFFICE O/P EST MOD 30 MIN: CPT | Performed by: FAMILY MEDICINE

## 2024-07-05 PROCEDURE — 3078F DIAST BP <80 MM HG: CPT | Performed by: FAMILY MEDICINE

## 2024-07-05 PROCEDURE — G8417 CALC BMI ABV UP PARAM F/U: HCPCS | Performed by: FAMILY MEDICINE

## 2024-07-05 RX ORDER — DOXYCYCLINE HYCLATE 100 MG
100 TABLET ORAL 2 TIMES DAILY
Qty: 20 TABLET | Refills: 0 | Status: SHIPPED | OUTPATIENT
Start: 2024-07-05 | End: 2024-07-15

## 2024-07-05 SDOH — ECONOMIC STABILITY: HOUSING INSECURITY
IN THE LAST 12 MONTHS, WAS THERE A TIME WHEN YOU DID NOT HAVE A STEADY PLACE TO SLEEP OR SLEPT IN A SHELTER (INCLUDING NOW)?: NO

## 2024-07-05 SDOH — ECONOMIC STABILITY: FOOD INSECURITY: WITHIN THE PAST 12 MONTHS, YOU WORRIED THAT YOUR FOOD WOULD RUN OUT BEFORE YOU GOT MONEY TO BUY MORE.: NEVER TRUE

## 2024-07-05 SDOH — ECONOMIC STABILITY: FOOD INSECURITY: WITHIN THE PAST 12 MONTHS, THE FOOD YOU BOUGHT JUST DIDN'T LAST AND YOU DIDN'T HAVE MONEY TO GET MORE.: NEVER TRUE

## 2024-07-05 SDOH — ECONOMIC STABILITY: TRANSPORTATION INSECURITY
IN THE PAST 12 MONTHS, HAS LACK OF TRANSPORTATION KEPT YOU FROM MEETINGS, WORK, OR FROM GETTING THINGS NEEDED FOR DAILY LIVING?: NO

## 2024-07-05 SDOH — ECONOMIC STABILITY: INCOME INSECURITY: HOW HARD IS IT FOR YOU TO PAY FOR THE VERY BASICS LIKE FOOD, HOUSING, MEDICAL CARE, AND HEATING?: NOT HARD AT ALL

## 2024-07-05 NOTE — PROGRESS NOTES
HPI     Chief Complaint   Patient presents with    Cyst     Left armpit        History of Present Illness  The patient is an 84-year-old female who is coming in for a cyst.    The patient reports the onset of a cyst approximately 5 days ago, characterized by a burning sensation in her L axillary region. She denies any associated fevers or chills. She has attempted self-treatment with a warm rice bag and applied Salonpas approximately four times daily.  It is noteworthy that her last mammogram was conducted in 02/2023. She has a hx of breast cancer and states her previous cancer began with symptoms of burning in this same axillary region.        Reviewed PmHx, RxHx, FmHx, SocHx, AllgHx and updated and dated in the chart.    Physical Exam:  BP (!) 177/68   Pulse 57   Resp 19   Ht 1.562 m (5' 1.5\")   Wt 75.3 kg (166 lb)   SpO2 99%   BMI 30.86 kg/m²     Physical Exam    WNWD, NAD  RRR, no murmur  CTA, no wheezes/ ronchi/ rales  Exam chaperoned by Serena Stark MA - She has 2 small cysts, one is tender without surrounding erythema about 1 cm in size and the other is erythematous with slight purulent drainage and tender to touch also about 1 cm in size, no breast masses or lymphadenopathy otherwise BL, she has a small seborrhoic keratosis on her R areola she says has been present for decades and a cyst on her R axillary region that has been present for 50 years she states       Results          No results found for this or any previous visit (from the past 12 hour(s)).}        Assessment / Plan       ICD-10-CM    1. History of breast cancer  Z85.3 [facility] DRU DIGITAL DIAGNOSTIC UNILATERAL LEFT     US BREAST COMPLETE LEFT      2. Skin lesion  L98.9 [facility] DRU DIGITAL DIAGNOSTIC UNILATERAL LEFT     US BREAST COMPLETE LEFT      3. Uncontrolled hypertension  I10            Assessment & Plan  1. Cyst - Unclear etiology of symptoms with unclear prognosis.   Antibiotics have been prescribed. The patient has been advised

## 2024-07-05 NOTE — PROGRESS NOTES
Health Decision Maker has been checked with the patient     Primary Decision Maker: Michelle No - Child - [phone]    Secondary Decision Maker: Kalpana Gant - Child - [phone]       AI form was signed    Chief Complaint   Patient presents with    Cyst     Left armpit        \"Have you been to the ER, urgent care clinic since your last visit?  Hospitalized since your last visit?\"    NO    “Have you seen or consulted any other health care providers outside of [facility] since your last visit?”    NO      Vitals:    07/05/24 1516   BP: (!) 195/68   Site: Left Upper Arm   Position: Sitting   Cuff Size: Medium Adult   Pulse: 57   Resp: 19   SpO2: 99%   Weight: 75.3 kg (166 lb)   Height: 1.562 m (5' 1.5\")      Depression: Not at risk (2/29/2024)    PHQ-2     PHQ-2 Score: 0              Click Here for Release of Records Request        Chart reviewed: immunizations are documented.   Immunization History   Administered Date(s) Administered    COVID-19, PFIZER PURPLE top, DILUTE for use, (age 12 y+), 30mcg/0.3mL 02/05/2021, 03/08/2021, 09/30/2021    Pneumococcal, PCV-13, PREVNAR 13, (age 6w+), IM, 0.5mL 03/04/2020

## 2024-07-17 DIAGNOSIS — I10 PRIMARY HYPERTENSION: ICD-10-CM

## 2024-07-17 DIAGNOSIS — E78.5 HYPERLIPIDEMIA, UNSPECIFIED HYPERLIPIDEMIA TYPE: ICD-10-CM

## 2024-07-17 NOTE — TELEPHONE ENCOUNTER
PCP: Harper Cabrera DO    Last Visit 2/29/2024   Future Appointments   Date Time Provider Department Center   7/26/2024  7:30 AM [facility] 2 MRMRM[facility]   7/26/2024  8:00 AM [facility] US 2 MRMRUS [facility]   7/29/2024  3:00 PM Harper Cabrera DO [facility]   11/22/2024  3:00 PM Nida Smith APRN - NP [facility]CRSP[facility]   11/29/2024  3:40 PM Jeremy Funes MD [facility]       Requested Prescriptions     Pending Prescriptions Disp Refills    amLODIPine (NORVASC) 5 MG tablet [Pharmacy Med Name: AMLODIPINE BESYLATE 5 MG TAB] 90 tablet 0     Sig: TAKE 1 TABLET BY MOUTH EVERY DAY IN THE MORNING    atorvastatin (LIPITOR) 80 MG tablet [Pharmacy Med Name: ATORVASTATIN 80 MG TABLET] 90 tablet 0     Sig: TAKE 1 TABLET BY MOUTH NIGHTLY PLEASE COMPLETE LABS FOR REFILLS.         Other Comments: Last Refill   04/28/24

## 2024-07-21 RX ORDER — ATORVASTATIN CALCIUM 80 MG/1
80 TABLET, FILM COATED ORAL NIGHTLY
Qty: 90 TABLET | Refills: 1 | Status: SHIPPED | OUTPATIENT
Start: 2024-07-21

## 2024-07-21 RX ORDER — AMLODIPINE BESYLATE 5 MG/1
5 TABLET ORAL EVERY MORNING
Qty: 90 TABLET | Refills: 1 | Status: SHIPPED | OUTPATIENT
Start: 2024-07-21

## 2024-07-23 DIAGNOSIS — M81.0 AGE RELATED OSTEOPOROSIS, UNSPECIFIED PATHOLOGICAL FRACTURE PRESENCE: ICD-10-CM

## 2024-07-24 NOTE — TELEPHONE ENCOUNTER
PCP: Harper Cabrera DO    Last Visit 5/2/2024   Future Appointments   Date Time Provider Department Center   7/26/2024  7:30 AM [facility] 2 MRMRMAM [facility]   7/26/2024  8:00 AM [facility] US 2 [facility]MRUS [facility]   7/29/2024  3:00 PM Harper Cabrera DO [facility]   11/22/2024  3:00 PM Nida Smith APRN - NP [facility]CR[facility]   11/29/2024  3:40 PM Jeremy Funes MD [facility]       Requested Prescriptions     Pending Prescriptions Disp Refills    alendronate (FOSAMAX) 70 MG tablet [Pharmacy Med Name: ALENDRONATE SODIUM 70 MG TAB] 12 tablet 1     Sig: TAKE 1 TABLET BY MOUTH ONE TIME PER WEEK         Other Comments: Last Refill

## 2024-07-25 RX ORDER — ALENDRONATE SODIUM 70 MG/1
TABLET ORAL
Qty: 12 TABLET | Refills: 1 | Status: SHIPPED | OUTPATIENT
Start: 2024-07-25

## 2024-07-26 ENCOUNTER — HOSPITAL ENCOUNTER (OUTPATIENT)
Facility: HOSPITAL | Age: 84
End: 2024-07-26
Payer: MEDICARE

## 2024-07-26 DIAGNOSIS — Z85.3 HISTORY OF BREAST CANCER: ICD-10-CM

## 2024-07-26 DIAGNOSIS — L98.9 SKIN LESION: ICD-10-CM

## 2024-07-26 PROCEDURE — 76882 US LMTD JT/FCL EVL NVASC XTR: CPT

## 2024-07-26 PROCEDURE — G0279 TOMOSYNTHESIS, MAMMO: HCPCS

## 2024-07-29 ENCOUNTER — OFFICE VISIT (OUTPATIENT)
Dept: PRIMARY CARE CLINIC | Facility: CLINIC | Age: 84
End: 2024-07-29
Payer: MEDICARE

## 2024-07-29 VITALS
HEART RATE: 69 BPM | BODY MASS INDEX: 31.42 KG/M2 | DIASTOLIC BLOOD PRESSURE: 71 MMHG | RESPIRATION RATE: 12 BRPM | HEIGHT: 61 IN | SYSTOLIC BLOOD PRESSURE: 158 MMHG | TEMPERATURE: 98.3 F | WEIGHT: 166.4 LBS | OXYGEN SATURATION: 98 %

## 2024-07-29 DIAGNOSIS — I10 PRIMARY HYPERTENSION: ICD-10-CM

## 2024-07-29 DIAGNOSIS — L98.9 SKIN LESION: ICD-10-CM

## 2024-07-29 DIAGNOSIS — I10 PRIMARY HYPERTENSION: Primary | ICD-10-CM

## 2024-07-29 PROCEDURE — 1036F TOBACCO NON-USER: CPT | Performed by: FAMILY MEDICINE

## 2024-07-29 PROCEDURE — G8399 PT W/DXA RESULTS DOCUMENT: HCPCS | Performed by: FAMILY MEDICINE

## 2024-07-29 PROCEDURE — 3077F SYST BP >= 140 MM HG: CPT | Performed by: FAMILY MEDICINE

## 2024-07-29 PROCEDURE — 1123F ACP DISCUSS/DSCN MKR DOCD: CPT | Performed by: FAMILY MEDICINE

## 2024-07-29 PROCEDURE — 3078F DIAST BP <80 MM HG: CPT | Performed by: FAMILY MEDICINE

## 2024-07-29 PROCEDURE — 99213 OFFICE O/P EST LOW 20 MIN: CPT | Performed by: FAMILY MEDICINE

## 2024-07-29 PROCEDURE — 1090F PRES/ABSN URINE INCON ASSESS: CPT | Performed by: FAMILY MEDICINE

## 2024-07-29 PROCEDURE — G8417 CALC BMI ABV UP PARAM F/U: HCPCS | Performed by: FAMILY MEDICINE

## 2024-07-29 PROCEDURE — G8427 DOCREV CUR MEDS BY ELIG CLIN: HCPCS | Performed by: FAMILY MEDICINE

## 2024-07-29 SDOH — ECONOMIC STABILITY: FOOD INSECURITY: WITHIN THE PAST 12 MONTHS, THE FOOD YOU BOUGHT JUST DIDN'T LAST AND YOU DIDN'T HAVE MONEY TO GET MORE.: NEVER TRUE

## 2024-07-29 SDOH — ECONOMIC STABILITY: FOOD INSECURITY: WITHIN THE PAST 12 MONTHS, YOU WORRIED THAT YOUR FOOD WOULD RUN OUT BEFORE YOU GOT MONEY TO BUY MORE.: NEVER TRUE

## 2024-07-29 SDOH — ECONOMIC STABILITY: INCOME INSECURITY: HOW HARD IS IT FOR YOU TO PAY FOR THE VERY BASICS LIKE FOOD, HOUSING, MEDICAL CARE, AND HEATING?: NOT HARD AT ALL

## 2024-07-29 ASSESSMENT — PATIENT HEALTH QUESTIONNAIRE - PHQ9
SUM OF ALL RESPONSES TO PHQ9 QUESTIONS 1 & 2: 0
2. FEELING DOWN, DEPRESSED OR HOPELESS: NOT AT ALL
1. LITTLE INTEREST OR PLEASURE IN DOING THINGS: NOT AT ALL
SUM OF ALL RESPONSES TO PHQ QUESTIONS 1-9: 0

## 2024-07-29 NOTE — PROGRESS NOTES
BP (!) 157/64 (Site: Right Upper Arm, Position: Sitting, Cuff Size: Large Adult)   Pulse 69   Temp 98.3 °F (36.8 °C) (Oral)   Resp 12   Ht 1.549 m (5' 1\")   Wt 75.5 kg (166 lb 6.4 oz)   SpO2 98%   BMI 31.44 kg/m²      \"Have you been to the ER, urgent care clinic since your last visit?  Hospitalized since your last visit?\"    NO    “Have you seen or consulted any other health care providers outside of [facility] since your last visit?”    YES - When: approximately 2-3 months ago.  Where and Why: [facility], she was having nose bleeds, and a heavy one, they had to cordorize it.            Click Here for Release of Records Request

## 2024-07-29 NOTE — PROGRESS NOTES
HPI     Chief Complaint   Patient presents with    Discuss Labs     Lumps on the left side/ mammogram done, everything was clear, but now a lump has appeared with the right side.      Patient states she has a small lump she noticed on R axilla region 1 week ago. She is accompanied by her daughter who states the lesion has been present for years. Both patient and daughter state it is not getting bigger. It does not bother the patient but she would like it removed. She has not felt any masses/ lumps in her breast. She had a mammogram/ US Of L breast and axilla region for a cyst that was draining on the L side. She was given Doxy and states this cleared up the infection. Her mammo/ US did not reveal any malignancy.     Reviewed PmHx, RxHx, FmHx, SocHx, AllgHx and updated and dated in the chart.    Physical Exam:  BP (!) 158/71   Pulse 69   Temp 98.3 °F (36.8 °C) (Oral)   Resp 12   Ht 1.549 m (5' 1\")   Wt 75.5 kg (166 lb 6.4 oz)   SpO2 98%   BMI 31.44 kg/m²     Physical Exam  WNWD, NAD  RRR, no murmur  CTA, no wheezes/ ronchi/ rales  Exam chaperoned by RAFFAELE Anderson - R breast without any palpable masses or lymphadenopathy. There is what appears to be a large closed comedone vs dilated pore of mitzy in R axilla region without drainage/ surrounding erythema/ warmth or tenderness to palpation.        Results          No results found for this or any previous visit (from the past 12 hour(s)).}        Assessment / Plan       ICD-10-CM    1. Primary hypertension  I10 Basic Metabolic Panel      2. Skin lesion  L98.9 External Referral To Dermatology           Assessment & Plan  HTN - BP is high. Will check BMP. She was previously prescribed Spironolactone but she is also on Lisinopril. She is followed by Dr. Funes. Will reach out pending BMP to see if they want her to start Aldactone or HCTZ. Her most recent systolic/ diastolic function was normal.   Skin Lesion - Given referral to Derm. She is on AC and discussed I

## 2024-07-30 LAB
ANION GAP SERPL CALC-SCNC: 4 MMOL/L (ref 5–15)
BUN SERPL-MCNC: 18 MG/DL (ref 6–20)
BUN/CREAT SERPL: 21 (ref 12–20)
CALCIUM SERPL-MCNC: 9.4 MG/DL (ref 8.5–10.1)
CHLORIDE SERPL-SCNC: 113 MMOL/L (ref 97–108)
CO2 SERPL-SCNC: 23 MMOL/L (ref 21–32)
CREAT SERPL-MCNC: 0.87 MG/DL (ref 0.55–1.02)
GLUCOSE SERPL-MCNC: 90 MG/DL (ref 65–100)
POTASSIUM SERPL-SCNC: 4.6 MMOL/L (ref 3.5–5.1)
SODIUM SERPL-SCNC: 140 MMOL/L (ref 136–145)

## 2024-08-11 DIAGNOSIS — I10 PRIMARY HYPERTENSION: ICD-10-CM

## 2024-08-13 ENCOUNTER — TELEPHONE (OUTPATIENT)
Dept: PRIMARY CARE CLINIC | Facility: CLINIC | Age: 84
End: 2024-08-13

## 2024-08-13 RX ORDER — LISINOPRIL 20 MG/1
40 TABLET ORAL EVERY MORNING
Qty: 180 TABLET | Refills: 1 | Status: SHIPPED | OUTPATIENT
Start: 2024-08-13

## 2024-08-13 NOTE — TELEPHONE ENCOUNTER
Spoke with patient's daughter on PHI. Disucssed Dr. Funes recommended increasing to 40mg of Lisinopril if BP remained high. She wants to refill 20mg first and will take 2 at a time to see how she does first. Sent in 20mg tablets.

## 2024-08-21 DIAGNOSIS — G30.1 ALZHEIMER'S DISEASE WITH LATE ONSET (CODE) (HCC): ICD-10-CM

## 2024-08-22 RX ORDER — MEMANTINE HYDROCHLORIDE 10 MG/1
10 TABLET ORAL 2 TIMES DAILY
Qty: 180 TABLET | Refills: 1 | Status: SHIPPED | OUTPATIENT
Start: 2024-08-22

## 2024-10-20 DIAGNOSIS — I48.91 UNSPECIFIED ATRIAL FIBRILLATION (HCC): ICD-10-CM

## 2024-10-21 RX ORDER — APIXABAN 5 MG/1
5 TABLET, FILM COATED ORAL 2 TIMES DAILY
Qty: 180 TABLET | Refills: 3 | Status: SHIPPED | OUTPATIENT
Start: 2024-10-21

## 2024-10-21 NOTE — TELEPHONE ENCOUNTER
Requested Prescriptions     Signed Prescriptions Disp Refills    ELIQUIS 5 MG TABS tablet 180 tablet 3     Sig: TAKE 1 TABLET BY MOUTH TWICE A DAY     Authorizing Provider: DEMETRICE FUNES     Ordering User: ERLINDA ALICEA MD    Future Appointments   Date Time Provider Department Center   11/22/2024  3:00 PM Nida Smith APRN - NP [facility]   11/29/2024  3:40 PM Demetrice Funes MD [facility]

## 2024-10-23 DIAGNOSIS — F03.B0: ICD-10-CM

## 2024-10-23 RX ORDER — DONEPEZIL HYDROCHLORIDE 10 MG/1
10 TABLET, FILM COATED ORAL NIGHTLY
Qty: 90 TABLET | Refills: 1 | Status: SHIPPED | OUTPATIENT
Start: 2024-10-23

## 2024-11-22 ENCOUNTER — OFFICE VISIT (OUTPATIENT)
Age: 84
End: 2024-11-22
Payer: MEDICARE

## 2024-11-22 VITALS
SYSTOLIC BLOOD PRESSURE: 162 MMHG | OXYGEN SATURATION: 98 % | DIASTOLIC BLOOD PRESSURE: 72 MMHG | RESPIRATION RATE: 20 BRPM | HEART RATE: 67 BPM

## 2024-11-22 DIAGNOSIS — R55 SYNCOPE AND COLLAPSE: ICD-10-CM

## 2024-11-22 DIAGNOSIS — G30.1 ALZHEIMER'S DISEASE WITH LATE ONSET (CODE) (HCC): Primary | ICD-10-CM

## 2024-11-22 PROCEDURE — G8484 FLU IMMUNIZE NO ADMIN: HCPCS

## 2024-11-22 PROCEDURE — 1160F RVW MEDS BY RX/DR IN RCRD: CPT

## 2024-11-22 PROCEDURE — G8427 DOCREV CUR MEDS BY ELIG CLIN: HCPCS

## 2024-11-22 PROCEDURE — G8399 PT W/DXA RESULTS DOCUMENT: HCPCS

## 2024-11-22 PROCEDURE — 1090F PRES/ABSN URINE INCON ASSESS: CPT

## 2024-11-22 PROCEDURE — G8417 CALC BMI ABV UP PARAM F/U: HCPCS

## 2024-11-22 PROCEDURE — 1126F AMNT PAIN NOTED NONE PRSNT: CPT

## 2024-11-22 PROCEDURE — 1036F TOBACCO NON-USER: CPT

## 2024-11-22 PROCEDURE — 99215 OFFICE O/P EST HI 40 MIN: CPT

## 2024-11-22 PROCEDURE — 1159F MED LIST DOCD IN RCRD: CPT

## 2024-11-22 PROCEDURE — 1123F ACP DISCUSS/DSCN MKR DOCD: CPT

## 2024-11-22 NOTE — PROGRESS NOTES
Joellen Souza is a 84 y.o. female who presents with the following  Chief Complaint   Patient presents with    Follow-up     Dementia        HPI  Patient is here today for dementia follow-up.  Patient was last seen November 17, 2023 by Wilman Vasquez NP at which time the patient was established on Aricept 10 mg nightly and Namenda 10 mg twice a day she had an EMG to rule out seizure disorder due to near syncopal episodes that was normal.  The patient does live with her daughter who does her finances, meals and grocery shopping.  The patient feeds, bathes and dresses herself.  She was having trouble with word finding and was having a lack of interest and wanting to socialize.  She was eating okay and sleeping a lot.  She was reading and watching TV to keep her brain active.  Everything was continued the same.    Today the patient and her daughter says that they feel that the patient is more forgetful and that she is having more difficulty remembering short-term information such as if the patient's daughter tells her something the night before she cannot remember the next day.  She continues to have word finding difficulties, and difficulties working her phone.  She is sleeping okay but taking a lot of catnaps during the day.  She is eating well.  She did have a fall last week onto her bottom and did not hit her head or get injured.  Patient's daughter believes that the patient got tangled up with the dog.  The patient says that she is still reading to keep her brain active and prefers Miky figueredo.  The patient's daughter says that the patient did have a couple more instances of syncopal episodes.  She says that in April it occurred at home and her blood pressure had dropped to 120's systolic, which is low for this patient.  She refused to go with the EMS to the hospital and did not have another episode until she was on an airplane flying to see family members in [place].  Patient's daughter states that

## 2024-11-22 NOTE — PROGRESS NOTES
Dementia- she remembers some things and some things she doesn't remember   Accompanied by her daughter   Forgetting more things, like discussing something the night before and the next morning she doesn't remember rarely does she actually remember what they discussed  Slight increase in trying to find her words   Having some difficulties using her phone   Appetite is here and then   Breakfast, sometimes a small lunch, usually a good dinner  Snacks are available sometimes   Fluid intake is not good   Sensation to cold and hot is a bit off

## 2024-11-25 ENCOUNTER — TELEPHONE (OUTPATIENT)
Age: 84
End: 2024-11-25

## 2024-11-25 NOTE — TELEPHONE ENCOUNTER
No pa required for ans testing [id number]  Medicare    Hold prior to testing  Lisinopril-3days  Amlodipine-10 days

## 2024-12-04 ENCOUNTER — PATIENT MESSAGE (OUTPATIENT)
Dept: PRIMARY CARE CLINIC | Facility: CLINIC | Age: 84
End: 2024-12-04

## 2024-12-05 DIAGNOSIS — I10 PRIMARY HYPERTENSION: ICD-10-CM

## 2024-12-05 RX ORDER — AMLODIPINE BESYLATE 5 MG/1
5 TABLET ORAL NIGHTLY
Qty: 90 TABLET | Refills: 1 | Status: SHIPPED | OUTPATIENT
Start: 2024-12-05

## 2024-12-13 ENCOUNTER — OFFICE VISIT (OUTPATIENT)
Age: 84
End: 2024-12-13
Payer: MEDICARE

## 2024-12-13 VITALS
HEART RATE: 75 BPM | BODY MASS INDEX: 31.98 KG/M2 | DIASTOLIC BLOOD PRESSURE: 62 MMHG | HEIGHT: 61 IN | WEIGHT: 169.4 LBS | SYSTOLIC BLOOD PRESSURE: 182 MMHG | OXYGEN SATURATION: 99 %

## 2024-12-13 DIAGNOSIS — I73.9 PAD (PERIPHERAL ARTERY DISEASE) (HCC): ICD-10-CM

## 2024-12-13 DIAGNOSIS — Z95.818 IMPLANTABLE LOOP RECORDER PRESENT: ICD-10-CM

## 2024-12-13 DIAGNOSIS — R55 SYNCOPE, UNSPECIFIED SYNCOPE TYPE: Primary | ICD-10-CM

## 2024-12-13 DIAGNOSIS — Z86.73 HISTORY OF CVA (CEREBROVASCULAR ACCIDENT): ICD-10-CM

## 2024-12-13 PROCEDURE — 1126F AMNT PAIN NOTED NONE PRSNT: CPT | Performed by: INTERNAL MEDICINE

## 2024-12-13 PROCEDURE — 1090F PRES/ABSN URINE INCON ASSESS: CPT | Performed by: INTERNAL MEDICINE

## 2024-12-13 PROCEDURE — G8484 FLU IMMUNIZE NO ADMIN: HCPCS | Performed by: INTERNAL MEDICINE

## 2024-12-13 PROCEDURE — G8399 PT W/DXA RESULTS DOCUMENT: HCPCS | Performed by: INTERNAL MEDICINE

## 2024-12-13 PROCEDURE — 1123F ACP DISCUSS/DSCN MKR DOCD: CPT | Performed by: INTERNAL MEDICINE

## 2024-12-13 PROCEDURE — 1036F TOBACCO NON-USER: CPT | Performed by: INTERNAL MEDICINE

## 2024-12-13 PROCEDURE — 99214 OFFICE O/P EST MOD 30 MIN: CPT | Performed by: INTERNAL MEDICINE

## 2024-12-13 PROCEDURE — G8417 CALC BMI ABV UP PARAM F/U: HCPCS | Performed by: INTERNAL MEDICINE

## 2024-12-13 PROCEDURE — G8428 CUR MEDS NOT DOCUMENT: HCPCS | Performed by: INTERNAL MEDICINE

## 2024-12-13 ASSESSMENT — PATIENT HEALTH QUESTIONNAIRE - PHQ9
2. FEELING DOWN, DEPRESSED OR HOPELESS: NOT AT ALL
SUM OF ALL RESPONSES TO PHQ QUESTIONS 1-9: 0
SUM OF ALL RESPONSES TO PHQ QUESTIONS 1-9: 0
SUM OF ALL RESPONSES TO PHQ9 QUESTIONS 1 & 2: 0
SUM OF ALL RESPONSES TO PHQ QUESTIONS 1-9: 0
1. LITTLE INTEREST OR PLEASURE IN DOING THINGS: NOT AT ALL
SUM OF ALL RESPONSES TO PHQ QUESTIONS 1-9: 0

## 2024-12-13 NOTE — PROGRESS NOTES
[facility] [place]:   [phone]    History of Present Illness:  Ms. Souza is an 83 yo F with admit for CVA and syncopal episode status post left TCAR with vascular on 12/30/2022, s/p right carotid TCAR 4/2023 followed by vascular Dr. Dumont, essential hypertension, history of nosebleeds.    She is here with her daughter, who helps give history.  Overall she denies any major changes in health.  No significant palpitation. Her breathing has been stable.  No exertional chest pain.  She denies any recent episodes of syncope, but has had some falls.  A few weeks ago apparently she was closing a curtain and had imbalance with the dog and fell.  She did see neurology and the plan is to do a tilt table test.  She has not had any significant findings on her implantable loop recorder. She did have an echo in May of this year that was normal, EF of 60-65%.  She is compensated on exam with clear lungs.  Assessment/Plan:  1. Syncope. She has an implantable loop recorder.  Thus far no evidence of cardiac contribution.  Her echo earlier this year was normal.  Implantable loop recordings, followed by Dr. Mandel in device clinic.    2. History of CVA, status post carotid TKAR 4/2023. With vascular, Dr. Dumont  3. Essential hypertension.  Blood pressure labile at times, but overall controlled.  White coat hypertension and elevated here.  4. History of nosebleeds.        She  has a past medical history of Breast CA (HCC), CAD (coronary artery disease), Cancer (HCC), Dementia (HCC), Diabetes (HCC), History of therapeutic radiation, Hypertension, Psychiatric disorder, Radiation therapy complication, and Stroke (HCC).    All other systems negative except as above.     PE  Vitals:    12/13/24 1533   BP: (!) 182/62   Site: Right Upper Arm   Position: Sitting   Cuff Size: Large Adult   Pulse: 75   SpO2: 99%   Weight: 76.8 kg (169 lb 6.4 oz)   Height: 1.549 m (5' 1\")      Body mass index is 32.01 kg/m².  General appearance - alert,

## 2025-01-08 DIAGNOSIS — M81.0 AGE RELATED OSTEOPOROSIS, UNSPECIFIED PATHOLOGICAL FRACTURE PRESENCE: ICD-10-CM

## 2025-01-10 RX ORDER — ALENDRONATE SODIUM 70 MG/1
TABLET ORAL
Qty: 12 TABLET | Refills: 1 | Status: SHIPPED | OUTPATIENT
Start: 2025-01-10

## 2025-01-14 DIAGNOSIS — E78.5 HYPERLIPIDEMIA, UNSPECIFIED HYPERLIPIDEMIA TYPE: ICD-10-CM

## 2025-01-15 RX ORDER — ASPIRIN 81 MG/1
81 TABLET ORAL DAILY
Qty: 90 TABLET | Refills: 0 | Status: SHIPPED | OUTPATIENT
Start: 2025-01-15

## 2025-01-15 RX ORDER — ATORVASTATIN CALCIUM 80 MG/1
80 TABLET, FILM COATED ORAL NIGHTLY
Qty: 90 TABLET | Refills: 0 | Status: SHIPPED | OUTPATIENT
Start: 2025-01-15

## 2025-01-31 ENCOUNTER — TELEPHONE (OUTPATIENT)
Age: 85
End: 2025-01-31

## 2025-01-31 ENCOUNTER — PROCEDURE VISIT (OUTPATIENT)
Age: 85
End: 2025-01-31

## 2025-01-31 DIAGNOSIS — R42 DIZZINESS: Primary | ICD-10-CM

## 2025-01-31 NOTE — PROGRESS NOTES
[facility]  [address], [address]  [place] [address]    Patient ID:  Joellen Souza  [id number]  84 y.o.  1940     REFERRED BY: Nida Smith NP  PCP: Harper Cabrera DO    Date of Testin2025       Indication/History:    Episodes of dizziness; (+) dementia, DM, stroke    Patient is coming for syncope/autonomic dysfunction evaluation.    Medications taken 48 hrs before the test: ASA, Lipitor, Fosamax, Norvasc, Aricept, Eliquis, Namenda, Lisinopril, Vit D (Only stopped for 24 hrs)     Procedure: This Autonomic Nervous System (ANS) testing is performed by utilizing [facility] Test Work Autonomic System, with established protocol.  Multiple procedures performed: Heart rate response to deep breathing (HRDB), Valsalva ratio, Heart rate and BP response to head up tilt (HUT), and Quantitative sudomotor axon reflex testing (QSWEAT) .     Result:  Heart response to deep  breathing (HRDB): 2 series of 6 cycles were performed and the mean of 6 consecutive cycles was obtained. Average HR difference was 8.91 and E:I ratio was 1.15. Normal response.  Heart rate response to Valsalva maneuver:  Vdba-wv-hmdf BP to Valsalva was measured. Pressure excursions are minimal (below 20 mm Hg) and are inadequate for interpretation.  HUT (head-up tilt) : Ocxc-lh-[id number] BP and HR were measured, up to 15 minutes post tilt.  No significant BP reduction or HR acceleration/deceleration.  SUDOMOTOR: QSWEAT response showed reduced sweat production in proximal leg and distal leg, comparing patient to age group.     Impression:     Pressure excursions during Valsalva maneuver are suboptimal due to insufficient patient effort and are inadequate for interpretation.    The rest of the cardiovascular autonomic portion is within normal limits with no evidence of orthostatic hypotension or significant tachycardia.    Reduced sweat production in proximal leg and distal leg, which can be seen in small

## 2025-01-31 NOTE — TELEPHONE ENCOUNTER
Called and spoke to daughter let her know that patient can come in for testing I will let the dr know some medications can alter the outcome of the test

## 2025-01-31 NOTE — TELEPHONE ENCOUNTER
Requesting a call back concerning if the pt can still come in today for her test. Stated the pt has been taking her medications she suppose to stop prior to testing.

## 2025-02-07 DIAGNOSIS — G30.1 ALZHEIMER'S DISEASE WITH LATE ONSET (CODE): ICD-10-CM

## 2025-02-07 RX ORDER — MEMANTINE HYDROCHLORIDE 10 MG/1
10 TABLET ORAL 2 TIMES DAILY
Qty: 180 TABLET | Refills: 1 | Status: SHIPPED | OUTPATIENT
Start: 2025-02-07

## 2025-02-11 DIAGNOSIS — I10 PRIMARY HYPERTENSION: ICD-10-CM

## 2025-02-11 RX ORDER — LISINOPRIL 20 MG/1
40 TABLET ORAL EVERY MORNING
Qty: 180 TABLET | Refills: 0 | Status: SHIPPED | OUTPATIENT
Start: 2025-02-11

## 2025-03-04 ENCOUNTER — TELEPHONE (OUTPATIENT)
Dept: PRIMARY CARE CLINIC | Facility: CLINIC | Age: 85
End: 2025-03-04

## 2025-03-04 DIAGNOSIS — E78.5 HYPERLIPIDEMIA, UNSPECIFIED HYPERLIPIDEMIA TYPE: Primary | ICD-10-CM

## 2025-03-04 DIAGNOSIS — I10 PRIMARY HYPERTENSION: ICD-10-CM

## 2025-03-04 NOTE — TELEPHONE ENCOUNTER
Called patients daughter (on PHI)     She said she is going to [facility]. She asked if Dr. Cabrera could order routine labs before patients upcoming physical appt on this Friday?

## 2025-03-04 NOTE — TELEPHONE ENCOUNTER
Patient has a possible UTI.  Her daughter is calling, I recommended she go to Urgent Care for immediate assistance.  She wanted me to let Dr Cabrera know what is going on.  She says she is not symptomatic but she did a home test and thinks there is something going on.

## 2025-03-06 ENCOUNTER — TELEPHONE (OUTPATIENT)
Dept: PRIMARY CARE CLINIC | Facility: CLINIC | Age: 85
End: 2025-03-06

## 2025-03-06 NOTE — TELEPHONE ENCOUNTER
Attempted to contact patient regarding upcoming Medicare wellness appointment and completion of HRA questionnaire. LVM for patient to please return call at  [phone], also sent mcm.

## 2025-03-07 ENCOUNTER — OFFICE VISIT (OUTPATIENT)
Dept: PRIMARY CARE CLINIC | Facility: CLINIC | Age: 85
End: 2025-03-07

## 2025-03-07 VITALS
HEIGHT: 61 IN | BODY MASS INDEX: 31.87 KG/M2 | TEMPERATURE: 97.8 F | OXYGEN SATURATION: 96 % | SYSTOLIC BLOOD PRESSURE: 150 MMHG | WEIGHT: 168.8 LBS | DIASTOLIC BLOOD PRESSURE: 63 MMHG | RESPIRATION RATE: 18 BRPM | HEART RATE: 63 BPM

## 2025-03-07 DIAGNOSIS — E78.5 HYPERLIPIDEMIA, UNSPECIFIED HYPERLIPIDEMIA TYPE: ICD-10-CM

## 2025-03-07 DIAGNOSIS — I10 PRIMARY HYPERTENSION: ICD-10-CM

## 2025-03-07 DIAGNOSIS — M81.0 AGE RELATED OSTEOPOROSIS, UNSPECIFIED PATHOLOGICAL FRACTURE PRESENCE: Primary | ICD-10-CM

## 2025-03-07 DIAGNOSIS — I65.23 BILATERAL CAROTID ARTERY STENOSIS: ICD-10-CM

## 2025-03-07 DIAGNOSIS — Z00.00 MEDICARE ANNUAL WELLNESS VISIT, SUBSEQUENT: ICD-10-CM

## 2025-03-07 RX ORDER — ATORVASTATIN CALCIUM 80 MG/1
80 TABLET, FILM COATED ORAL NIGHTLY
Qty: 90 TABLET | Refills: 3 | Status: SHIPPED | OUTPATIENT
Start: 2025-03-07

## 2025-03-07 RX ORDER — ALENDRONATE SODIUM 70 MG/1
70 TABLET ORAL
Qty: 12 TABLET | Refills: 1 | Status: SHIPPED | OUTPATIENT
Start: 2025-03-07

## 2025-03-07 RX ORDER — LISINOPRIL 20 MG/1
40 TABLET ORAL EVERY MORNING
Qty: 180 TABLET | Refills: 3 | Status: SHIPPED | OUTPATIENT
Start: 2025-03-07

## 2025-03-07 RX ORDER — ASPIRIN 81 MG/1
81 TABLET ORAL DAILY
Qty: 90 TABLET | Refills: 3 | Status: SHIPPED | OUTPATIENT
Start: 2025-03-07

## 2025-03-07 RX ORDER — AMLODIPINE BESYLATE 5 MG/1
5 TABLET ORAL NIGHTLY
Qty: 90 TABLET | Refills: 3 | Status: SHIPPED | OUTPATIENT
Start: 2025-03-07

## 2025-03-07 SDOH — ECONOMIC STABILITY: FOOD INSECURITY: WITHIN THE PAST 12 MONTHS, THE FOOD YOU BOUGHT JUST DIDN'T LAST AND YOU DIDN'T HAVE MONEY TO GET MORE.: NEVER TRUE

## 2025-03-07 SDOH — ECONOMIC STABILITY: INCOME INSECURITY: IN THE LAST 12 MONTHS, WAS THERE A TIME WHEN YOU WERE NOT ABLE TO PAY THE MORTGAGE OR RENT ON TIME?: NO

## 2025-03-07 SDOH — ECONOMIC STABILITY: TRANSPORTATION INSECURITY
IN THE PAST 12 MONTHS, HAS THE LACK OF TRANSPORTATION KEPT YOU FROM MEDICAL APPOINTMENTS OR FROM GETTING MEDICATIONS?: NO

## 2025-03-07 SDOH — ECONOMIC STABILITY: FOOD INSECURITY: WITHIN THE PAST 12 MONTHS, YOU WORRIED THAT YOUR FOOD WOULD RUN OUT BEFORE YOU GOT MONEY TO BUY MORE.: NEVER TRUE

## 2025-03-07 SDOH — HEALTH STABILITY: PHYSICAL HEALTH: ON AVERAGE, HOW MANY DAYS PER WEEK DO YOU ENGAGE IN MODERATE TO STRENUOUS EXERCISE (LIKE A BRISK WALK)?: 5 DAYS

## 2025-03-07 SDOH — HEALTH STABILITY: PHYSICAL HEALTH: ON AVERAGE, HOW MANY MINUTES DO YOU ENGAGE IN EXERCISE AT THIS LEVEL?: 10 MIN

## 2025-03-07 ASSESSMENT — PATIENT HEALTH QUESTIONNAIRE - PHQ9
2. FEELING DOWN, DEPRESSED OR HOPELESS: NOT AT ALL
SUM OF ALL RESPONSES TO PHQ QUESTIONS 1-9: 1
1. LITTLE INTEREST OR PLEASURE IN DOING THINGS: SEVERAL DAYS
SUM OF ALL RESPONSES TO PHQ QUESTIONS 1-9: 1

## 2025-03-07 ASSESSMENT — LIFESTYLE VARIABLES
HOW OFTEN DO YOU HAVE A DRINK CONTAINING ALCOHOL: 1
HOW MANY STANDARD DRINKS CONTAINING ALCOHOL DO YOU HAVE ON A TYPICAL DAY: PATIENT DOES NOT DRINK
HOW MANY STANDARD DRINKS CONTAINING ALCOHOL DO YOU HAVE ON A TYPICAL DAY: 0
HOW OFTEN DO YOU HAVE SIX OR MORE DRINKS ON ONE OCCASION: 1
HOW OFTEN DO YOU HAVE A DRINK CONTAINING ALCOHOL: NEVER

## 2025-03-07 NOTE — PROGRESS NOTES
Health Decision Maker has been checked with the patient   Primary Decision Maker: Michelle No - Child - [phone]    Secondary Decision Maker: GantKalpana - Child - [phone]     AI form was signed    Chief Complaint   Patient presents with    Medicare AWV       \"Have you been to the ER, urgent care clinic since your last visit?  Hospitalized since your last visit?\"    NO    “Have you seen or consulted any other health care providers outside of [facility] since your last visit?”    NO      Vitals:    03/07/25 1309   Temp: 97.8 °F (36.6 °C)   TempSrc: Oral   Height: 1.549 m (5' 1\")      Depression: Not at risk (3/7/2025)    PHQ-2     PHQ-2 Score: 1              Click Here for Release of Records Request    Chart reviewed: immunizations are documented.   Immunization History   Administered Date(s) Administered    COVID-19, PFIZER PURPLE top, DILUTE for use, (age 12 y+), 30mcg/0.3mL 02/05/2021, 03/08/2021, 09/30/2021    Pneumococcal, PCV-13, PREVNAR 13, (age 6w+), IM, 0.5mL 03/04/2020      
Assessment and screening values have been reviewed and are found in Flowsheets. The following problems were reviewed today and where indicated follow up appointments were made and/or referrals ordered.    Tdap vaccine about 6 years ago per patient  Shingles vaccine states she had in [place]  RSV Vaccine discussed  COVID Vaccine discussed  Mammo due   DEXA osteoporotic in 2024, started Fosamax May 2024, due in 2026  Colon cancer screening has stopped    Positive Risk Factor Screenings with Interventions:                Abnormal BMI (obese):  Body mass index is 31.89 kg/m². (!) Abnormal  Interventions:  States she eats pretty healthy. For breakfast she eats cereal, sargento packs for lunch, eats what family cooks for dinner (usually chicken based) more vegetables then starch. She drinks coffee and water. No soda or juices. Does not snack. Does not eat sweets frequently. Walks dog in the house. On feet all day. Does laps around inside house. Does not do any other formal exercise.     Obesity Counseling: Patient was asked about her current diet and exercise habits, and personalized advice was provided regarding recommended lifestyle changes. Patient's comorbid health conditions associated with elevated BMI were discussed, as well as the likely benefits of weight loss. Based upon patient's motivation to change her behavior, the following plan was agreed upon to work toward a weight loss goal of 5 pounds: lower carbohydrate diet and increase physical activity, as tolerated. Educational materials for weight loss were provided.  Patient will follow-up in 12 month(s) with PCP. Time spent counseling patient: 8+ minutes        ADL's:   Patient reports needing help with:  Select all that apply: (!) (Patient-Rptd) Banking/Finances, Shopping, Food Preparation, Transportation, Taking Medications  Interventions:  Family is able to help with all those things.                   Objective   Vitals:    03/07/25 1309 03/07/25 1352   BP: (!) 
none

## 2025-03-07 NOTE — PATIENT INSTRUCTIONS
includes eating healthy, being active, staying at a weight that's healthy for you, and not smoking or using tobacco. It also includes taking medicines as directed, managing other health conditions, and trying to get a healthy amount of sleep.  Follow-up care is a key part of your treatment and safety. Be sure to make and go to all appointments, and call your doctor if you are having problems. It's also a good idea to know your test results and keep a list of the medicines you take.  How can you care for yourself at home?  Diet    Use less salt when you cook and eat. This helps lower your blood pressure. Taste food before salting. Add only a little salt when you think you need it. With time, your taste buds will adjust to less salt.     Eat fewer snack items, fast foods, canned soups, and other high-salt, high-fat, processed foods.     Read food labels and try to avoid saturated and trans fats. They increase your risk of heart disease by raising cholesterol levels.     Limit the amount of solid fat--butter, margarine, and shortening--you eat. Use olive, peanut, or canola oil when you cook. Bake, broil, and steam foods instead of frying them.     Eat a variety of fruit and vegetables every day. Dark green, deep orange, red, or yellow fruits and vegetables are especially good for you. Examples include spinach, carrots, peaches, and berries.     Foods high in fiber can reduce your cholesterol and provide important vitamins and minerals. High-fiber foods include whole-grain cereals and breads, oatmeal, beans, brown rice, citrus fruits, and apples.     Eat lean proteins. Heart-healthy proteins include seafood, lean meats and poultry, eggs, beans, peas, nuts, seeds, and soy products.     Limit drinks and foods with added sugar. These include candy, desserts, and soda pop.   Heart-healthy lifestyle    If your doctor recommends it, get more exercise. For many people, walking is a good choice. Or you may want to swim, bike,

## 2025-03-08 LAB
ALBUMIN SERPL-MCNC: 3.4 G/DL (ref 3.5–5)
ALBUMIN/GLOB SERPL: 1.1 (ref 1.1–2.2)
ALP SERPL-CCNC: 83 U/L (ref 45–117)
ALT SERPL-CCNC: 29 U/L (ref 12–78)
ANION GAP SERPL CALC-SCNC: 3 MMOL/L (ref 2–12)
AST SERPL-CCNC: 21 U/L (ref 15–37)
BILIRUB SERPL-MCNC: 0.5 MG/DL (ref 0.2–1)
BUN SERPL-MCNC: 13 MG/DL (ref 6–20)
BUN/CREAT SERPL: 10 (ref 12–20)
CALCIUM SERPL-MCNC: 9 MG/DL (ref 8.5–10.1)
CHLORIDE SERPL-SCNC: 110 MMOL/L (ref 97–108)
CHOLEST SERPL-MCNC: 147 MG/DL
CO2 SERPL-SCNC: 25 MMOL/L (ref 21–32)
CREAT SERPL-MCNC: 1.25 MG/DL (ref 0.55–1.02)
ERYTHROCYTE [DISTWIDTH] IN BLOOD BY AUTOMATED COUNT: 13 % (ref 11.5–14.5)
GLOBULIN SER CALC-MCNC: 3.2 G/DL (ref 2–4)
GLUCOSE SERPL-MCNC: 151 MG/DL (ref 65–100)
HCT VFR BLD AUTO: 37.8 % (ref 35–47)
HDLC SERPL-MCNC: 49 MG/DL
HDLC SERPL: 3 (ref 0–5)
HGB BLD-MCNC: 12 G/DL (ref 11.5–16)
LDLC SERPL CALC-MCNC: 69.6 MG/DL (ref 0–100)
MCH RBC QN AUTO: 30.8 PG (ref 26–34)
MCHC RBC AUTO-ENTMCNC: 31.7 G/DL (ref 30–36.5)
MCV RBC AUTO: 97.2 FL (ref 80–99)
NRBC # BLD: 0 K/UL (ref 0–0.01)
NRBC BLD-RTO: 0 PER 100 WBC
PLATELET # BLD AUTO: 208 K/UL (ref 150–400)
PMV BLD AUTO: 11.8 FL (ref 8.9–12.9)
POTASSIUM SERPL-SCNC: 4.6 MMOL/L (ref 3.5–5.1)
PROT SERPL-MCNC: 6.6 G/DL (ref 6.4–8.2)
RBC # BLD AUTO: 3.89 M/UL (ref 3.8–5.2)
SODIUM SERPL-SCNC: 138 MMOL/L (ref 136–145)
TRIGL SERPL-MCNC: 142 MG/DL
VLDLC SERPL CALC-MCNC: 28.4 MG/DL
WBC # BLD AUTO: 6.6 K/UL (ref 3.6–11)

## 2025-03-09 ENCOUNTER — RESULTS FOLLOW-UP (OUTPATIENT)
Dept: PRIMARY CARE CLINIC | Facility: CLINIC | Age: 85
End: 2025-03-09

## 2025-03-09 DIAGNOSIS — N18.32 CHRONIC KIDNEY DISEASE, STAGE 3B (HCC): Primary | ICD-10-CM

## 2025-03-14 DIAGNOSIS — N18.32 CHRONIC KIDNEY DISEASE, STAGE 3B (HCC): ICD-10-CM

## 2025-03-14 LAB
ANION GAP SERPL CALC-SCNC: 3 MMOL/L (ref 2–12)
BUN SERPL-MCNC: 20 MG/DL (ref 6–20)
BUN/CREAT SERPL: 17 (ref 12–20)
CALCIUM SERPL-MCNC: 9.5 MG/DL (ref 8.5–10.1)
CHLORIDE SERPL-SCNC: 109 MMOL/L (ref 97–108)
CO2 SERPL-SCNC: 22 MMOL/L (ref 21–32)
CREAT SERPL-MCNC: 1.17 MG/DL (ref 0.55–1.02)
GLUCOSE SERPL-MCNC: 130 MG/DL (ref 65–100)
POTASSIUM SERPL-SCNC: 4.6 MMOL/L (ref 3.5–5.1)
SODIUM SERPL-SCNC: 134 MMOL/L (ref 136–145)

## 2025-03-16 ENCOUNTER — RESULTS FOLLOW-UP (OUTPATIENT)
Dept: PRIMARY CARE CLINIC | Facility: CLINIC | Age: 85
End: 2025-03-16

## 2025-03-18 ENCOUNTER — TRANSCRIBE ORDERS (OUTPATIENT)
Facility: HOSPITAL | Age: 85
End: 2025-03-18

## 2025-03-18 DIAGNOSIS — Z12.31 VISIT FOR SCREENING MAMMOGRAM: Primary | ICD-10-CM

## 2025-04-21 ENCOUNTER — OFFICE VISIT (OUTPATIENT)
Dept: PRIMARY CARE CLINIC | Facility: CLINIC | Age: 85
End: 2025-04-21
Payer: MEDICARE

## 2025-04-21 VITALS
RESPIRATION RATE: 16 BRPM | TEMPERATURE: 97.2 F | HEIGHT: 61 IN | SYSTOLIC BLOOD PRESSURE: 185 MMHG | HEART RATE: 68 BPM | DIASTOLIC BLOOD PRESSURE: 72 MMHG | BODY MASS INDEX: 31.23 KG/M2 | WEIGHT: 165.4 LBS | OXYGEN SATURATION: 96 %

## 2025-04-21 DIAGNOSIS — R39.9 UTI SYMPTOMS: Primary | ICD-10-CM

## 2025-04-21 DIAGNOSIS — N39.0 RECURRENT UTI: ICD-10-CM

## 2025-04-21 LAB
BILIRUBIN, URINE, POC: NEGATIVE
BLOOD URINE, POC: ABNORMAL
GLUCOSE URINE, POC: NEGATIVE
KETONES, URINE, POC: ABNORMAL
LEUKOCYTE ESTERASE, URINE, POC: ABNORMAL
NITRITE, URINE, POC: NEGATIVE
PH, URINE, POC: 5.5 (ref 4.6–8)
PROTEIN,URINE, POC: ABNORMAL
SPECIFIC GRAVITY, URINE, POC: 1.02 (ref 1–1.03)
URINALYSIS CLARITY, POC: CLEAR
URINALYSIS COLOR, POC: YELLOW
UROBILINOGEN, POC: ABNORMAL MG/DL

## 2025-04-21 PROCEDURE — 99213 OFFICE O/P EST LOW 20 MIN: CPT | Performed by: NURSE PRACTITIONER

## 2025-04-21 PROCEDURE — 81001 URINALYSIS AUTO W/SCOPE: CPT | Performed by: NURSE PRACTITIONER

## 2025-04-21 PROCEDURE — G8417 CALC BMI ABV UP PARAM F/U: HCPCS | Performed by: NURSE PRACTITIONER

## 2025-04-21 PROCEDURE — 1036F TOBACCO NON-USER: CPT | Performed by: NURSE PRACTITIONER

## 2025-04-21 PROCEDURE — 1160F RVW MEDS BY RX/DR IN RCRD: CPT | Performed by: NURSE PRACTITIONER

## 2025-04-21 PROCEDURE — G8399 PT W/DXA RESULTS DOCUMENT: HCPCS | Performed by: NURSE PRACTITIONER

## 2025-04-21 PROCEDURE — 1123F ACP DISCUSS/DSCN MKR DOCD: CPT | Performed by: NURSE PRACTITIONER

## 2025-04-21 PROCEDURE — 1159F MED LIST DOCD IN RCRD: CPT | Performed by: NURSE PRACTITIONER

## 2025-04-21 PROCEDURE — G8427 DOCREV CUR MEDS BY ELIG CLIN: HCPCS | Performed by: NURSE PRACTITIONER

## 2025-04-21 PROCEDURE — 1090F PRES/ABSN URINE INCON ASSESS: CPT | Performed by: NURSE PRACTITIONER

## 2025-04-21 RX ORDER — CEPHALEXIN 500 MG/1
500 CAPSULE ORAL 2 TIMES DAILY
Qty: 14 CAPSULE | Refills: 0 | Status: SHIPPED | OUTPATIENT
Start: 2025-04-21 | End: 2025-04-28

## 2025-04-21 ASSESSMENT — PATIENT HEALTH QUESTIONNAIRE - PHQ9
SUM OF ALL RESPONSES TO PHQ QUESTIONS 1-9: 0
2. FEELING DOWN, DEPRESSED OR HOPELESS: NOT AT ALL
1. LITTLE INTEREST OR PLEASURE IN DOING THINGS: NOT AT ALL

## 2025-04-21 ASSESSMENT — ENCOUNTER SYMPTOMS
COUGH: 0
BACK PAIN: 0
SHORTNESS OF BREATH: 0
RHINORRHEA: 0
COLOR CHANGE: 0
DIARRHEA: 0
ABDOMINAL PAIN: 0
SORE THROAT: 0
CHEST TIGHTNESS: 0
EYE DISCHARGE: 0
CONSTIPATION: 0

## 2025-04-21 NOTE — PROGRESS NOTES
[facility]   [address], [address]  [place] [address]  P: [phone]  F: [phone]    SUBJECTIVE     HPI:     Joellen Souza is a 84 y.o. female who is seen in the clinic for   Chief Complaint   Patient presents with    Urinary Tract Infection     Patient's symptoms consist of peeing a lot and patient's daughter is a nurse and has tested her.        The patient presents to the office today c/o of UTI symptoms that started over the weekend. Patient reports symptoms such as, more forgetful that normal and vivid dreams. Daughter advises no abnormal color or smell to the urine. Daughter reports the patient had a UTI about a month ago and was treated from [facility] with Bactrim DS for 7 days. Patient was also seen at the Neurologist one month prior to that was similar symptoms and was treated for a UTI. Daughter reports some hygiene issues as well; the patient is only allowing the daughter to bathe her once a week. Daughter denies the patient being febrile and advises she is continent mostly of bowel and bladder.     Patients denies SOB, chest pain, palpitations, nausea, vomiting, diarrhea or constipation.    BP is high today at 185/72, 158/76 on repeat with manual cuff. Daughter reports that's how the patients blood pressure runs at home as well.           Patient Active Problem List   Diagnosis    Symptomatic stenosis of left carotid artery    Syncope and collapse    Alzheimer's disease, unspecified (CODE)    Carotid stenosis, asymptomatic, right    History of loop recorder    Alzheimer's disease with late onset (CODE)    Stroke risk        Past Medical History:   Diagnosis Date    Breast CA 2000    LEFT    CAD (coronary artery disease)     Cancer (HCC)     Dementia ([facility]) 2023    Diabetes ([facility])     PATIENT DENIES AS OF 4/5/23    History of therapeutic radiation     2008 breast CA    Hypertension     Psychiatric disorder     Radiation therapy complication     2008    Stroke ([facility]) 10/2022

## 2025-04-21 NOTE — PROGRESS NOTES
Health Decision Maker has been checked with the patient   Primary Decision Maker: Michelle No - Child - [phone]    Secondary Decision Maker: StalinKalpana - Child - [phone]         Chief Complaint   Patient presents with    Urinary Tract Infection     Patient's symptoms consist of peeing a lot and patient's daughter is a nurse and has tested her.       \"Have you been to the ER, urgent care clinic since your last visit?  Hospitalized since your last visit?\"    3 weeks [facility]    “Have you seen or consulted any other health care providers outside of [facility] since your last visit?”    Yes, [facility].      Vitals:    04/21/25 1404   Temp: 97.2 °F (36.2 °C)   TempSrc: Temporal   Weight: 75 kg (165 lb 6.4 oz)   Height: 1.549 m (5' 1\")                 Click Here for Release of Records Request

## 2025-04-22 LAB
BACTERIA SPEC CULT: NORMAL
SERVICE CMNT-IMP: NORMAL

## 2025-05-03 ENCOUNTER — TELEPHONE (OUTPATIENT)
Dept: PRIMARY CARE CLINIC | Facility: CLINIC | Age: 85
End: 2025-05-03

## 2025-05-03 NOTE — TELEPHONE ENCOUNTER
Patient's daughter called as her mother is having flu like symptoms: weakness, shaky, night sweats, congestion, rhinorrhea. Household members have had the flu recently. Advised I am not able to prescribed anything without evaluating her. Recommend urgent care evaluation. She verbalized understanding.

## 2025-05-07 DIAGNOSIS — F03.B0: ICD-10-CM

## 2025-05-07 RX ORDER — DONEPEZIL HYDROCHLORIDE 10 MG/1
10 TABLET, FILM COATED ORAL NIGHTLY
Qty: 90 TABLET | Refills: 0 | Status: SHIPPED | OUTPATIENT
Start: 2025-05-07

## 2025-05-16 ENCOUNTER — OFFICE VISIT (OUTPATIENT)
Age: 85
End: 2025-05-16
Payer: MEDICARE

## 2025-05-16 VITALS
HEART RATE: 61 BPM | OXYGEN SATURATION: 98 % | SYSTOLIC BLOOD PRESSURE: 172 MMHG | RESPIRATION RATE: 20 BRPM | DIASTOLIC BLOOD PRESSURE: 68 MMHG

## 2025-05-16 DIAGNOSIS — G30.1 ALZHEIMER'S DISEASE WITH LATE ONSET (CODE) (HCC): Primary | ICD-10-CM

## 2025-05-16 PROCEDURE — G8399 PT W/DXA RESULTS DOCUMENT: HCPCS

## 2025-05-16 PROCEDURE — 1160F RVW MEDS BY RX/DR IN RCRD: CPT

## 2025-05-16 PROCEDURE — 1159F MED LIST DOCD IN RCRD: CPT

## 2025-05-16 PROCEDURE — 1090F PRES/ABSN URINE INCON ASSESS: CPT

## 2025-05-16 PROCEDURE — 1123F ACP DISCUSS/DSCN MKR DOCD: CPT

## 2025-05-16 PROCEDURE — 99214 OFFICE O/P EST MOD 30 MIN: CPT

## 2025-05-16 PROCEDURE — 1126F AMNT PAIN NOTED NONE PRSNT: CPT

## 2025-05-16 PROCEDURE — 1036F TOBACCO NON-USER: CPT

## 2025-05-16 PROCEDURE — G8427 DOCREV CUR MEDS BY ELIG CLIN: HCPCS

## 2025-05-16 PROCEDURE — G8417 CALC BMI ABV UP PARAM F/U: HCPCS

## 2025-05-16 NOTE — PROGRESS NOTES
Memory- up and down   Accompanied by daughter   Having more issues with finding words, walking a bit slower, memory declined   Comprehension has gotten less and less     ANS test results       
Dementia (HCC)     Diabetes (HCC)     PATIENT DENIES AS OF 24    History of therapeutic radiation     2008 breast CA    Hypertension     Psychiatric disorder     Radiation therapy complication     2008    Stroke (HCC) 10/2022    NO RESIDUAL       Past Surgical History:   Procedure Laterality Date    BREAST BIOPSY Left     2008    BREAST LUMPECTOMY Left     2008    CATARACT REMOVAL Bilateral 2018    CHOLECYSTECTOMY      COLONOSCOPY      ORTHOPEDIC SURGERY      SHOULDER FRACTURE    OTHER SURGICAL HISTORY Left 2022    TRANSCAROTID ARTERY REVASCULARIZATION    OTHER SURGICAL HISTORY  2023    krystyna device, Dr. Sanon Mandel phone# [phone]    PARTIAL HYSTERECTOMY (CERVIX NOT REMOVED)         Family History   Problem Relation Age of Onset    Parkinson's Disease Brother     Parkinson's Disease Sister     Cancer Sister         BREAST    Cancer Father         unknown cancer    Parkinson's Disease Mother     Anesth Problems Neg Hx     Other Brother         HEALTH PROBS R/T WAR       Social History     Socioeconomic History    Marital status:      Spouse name: None    Number of children: None    Years of education: None    Highest education level: None   Tobacco Use    Smoking status: Former     Current packs/day: 0.00     Types: Cigarettes     Quit date: 1998     Years since quittin.3    Smokeless tobacco: Never   Vaping Use    Vaping status: Never Used   Substance and Sexual Activity    Alcohol use: Not Currently    Drug use: Never    Sexual activity: Defer     Birth control/protection: None     Social Drivers of Health     Financial Resource Strain: Low Risk  (2024)    Overall Financial Resource Strain (CARDIA)     Difficulty of Paying Living Expenses: Not hard at all   Food Insecurity: No Food Insecurity (3/7/2025)    Hunger Vital Sign     Worried About Running Out of Food in the Last Year: Never true     Ran Out of Food in the Last Year: Never true   Transportation Needs: No

## 2025-06-10 ENCOUNTER — TELEPHONE (OUTPATIENT)
Dept: PRIMARY CARE CLINIC | Facility: CLINIC | Age: 85
End: 2025-06-10

## 2025-08-01 ENCOUNTER — RESULTS FOLLOW-UP (OUTPATIENT)
Dept: PRIMARY CARE CLINIC | Facility: CLINIC | Age: 85
End: 2025-08-01

## 2025-08-01 ENCOUNTER — HOSPITAL ENCOUNTER (OUTPATIENT)
Facility: HOSPITAL | Age: 85
Discharge: HOME | End: 2025-08-01
Payer: MEDICARE

## 2025-08-01 DIAGNOSIS — Z12.31 VISIT FOR SCREENING MAMMOGRAM: ICD-10-CM

## 2025-08-01 PROCEDURE — 77063 BREAST TOMOSYNTHESIS BI: CPT

## 2025-08-09 DIAGNOSIS — G30.1 ALZHEIMER'S DISEASE WITH LATE ONSET (CODE) (HCC): ICD-10-CM

## 2025-08-09 DIAGNOSIS — F03.B0: ICD-10-CM

## 2025-08-10 RX ORDER — MEMANTINE HYDROCHLORIDE 10 MG/1
10 TABLET ORAL 2 TIMES DAILY
Qty: 180 TABLET | Refills: 1 | Status: SHIPPED | OUTPATIENT
Start: 2025-08-10

## 2025-08-10 RX ORDER — DONEPEZIL HYDROCHLORIDE 10 MG/1
10 TABLET, FILM COATED ORAL NIGHTLY
Qty: 90 TABLET | Refills: 0 | Status: SHIPPED | OUTPATIENT
Start: 2025-08-10

## (undated) DEVICE — 40418 TRENDELENBURG ONE-STEP ARM PROTECTORS LARGE (1 PAIR): Brand: 40418 TRENDELENBURG ONE-STEP ARM PROTECTORS LARGE (1 PAIR)

## (undated) DEVICE — ADHESIVE SKIN CLSR 0.7ML TOP DERMBND ADV

## (undated) DEVICE — PROVE COVER: Brand: UNBRANDED

## (undated) DEVICE — INTRODUCER SHTH 0.018 IN 21 GAX7 CM TRANSCAROTID ACCS KT

## (undated) DEVICE — COTTON TAPE,PRE-CUT,2X WHITE STRANDS: Brand: UMBILICAL TAPE

## (undated) DEVICE — SPONGE HEMOSTAT CELLULS 2X14IN -- SURGICEL

## (undated) DEVICE — TOWEL SURG W17XL27IN STD BLU COT NONFENESTRATED PREWASHED

## (undated) DEVICE — SYRINGE MED BRL 10 CC CNTRST FIX M LUER CONN GRN MEDALLION

## (undated) DEVICE — PAD,NON-ADHERENT,3X8,STERILE,LF,1/PK: Brand: MEDLINE

## (undated) DEVICE — MICROPUNCTURE, INTRODUCER SET SILHOUETTE, TRANSITIONLESS PUSH-PLUS DESIGN - STIFFENED CANNULA WITH NITINOL WIRE GUIDE: Brand: MICROPUNCTURE

## (undated) DEVICE — SUTURE PERMAHAND SZ 2-0 L18IN NONABSORBABLE BLK L26MM SH C012D

## (undated) DEVICE — DERMABOND SKIN ADH 0.7ML -- DERMABOND ADVANCED 12/BX

## (undated) DEVICE — PACK,ORTOHMAX/CVMAX,UNIVERSAL,5/CS: Brand: MEDLINE

## (undated) DEVICE — GUIDEWIRE VASC STR 3 CM 0.014 INX190 CM HI TORQ WHISPER MS

## (undated) DEVICE — 3M™ TEGADERM™ TRANSPARENT FILM DRESSING FRAME STYLE, 1626W, 4 IN X 4-3/4 IN (10 CM X 12 CM), 50/CT 4CT/CASE: Brand: 3M™ TEGADERM™

## (undated) DEVICE — SOLUTION IRRIG 1000ML STRL H2O USP PLAS POUR BTL

## (undated) DEVICE — C-ARM: Brand: UNBRANDED

## (undated) DEVICE — INTENDED TO STANDARDIZE OR CAMERAS TO ALLOW FOR THE USE OF THE OR CAMERA COVER: Brand: ASPEN® O.R. CAMERA COVER

## (undated) DEVICE — 3M™ IOBAN™ 2 ANTIMICROBIAL INCISE DRAPE 6648EZ: Brand: IOBAN™ 2

## (undated) DEVICE — VASCULAR-SMH: Brand: MEDLINE INDUSTRIES, INC.

## (undated) DEVICE — PTA BALLOON DILATATION CATHETER: Brand: STERLING™

## (undated) DEVICE — SUTURE VCRL SZ 3-0 L27IN ABSRB UD L26MM SH 1/2 CIR J416H

## (undated) DEVICE — SUTURE VCRL SZ 2-0 L27IN ABSRB UD L26MM SH 1/2 CIR J417H

## (undated) DEVICE — Device

## (undated) DEVICE — SUTURE VCRL SZ 2-0 L36IN ABSRB UD L40MM CT 1/2 CIR J957H

## (undated) DEVICE — GLOVE ORTHO 8   MSG9480

## (undated) DEVICE — MICROPUNCTURE INTRODUCER SET SILHOUETTE TRANSITIONLESS WITH STAINLESS STEEL WIRE GUIDE: Brand: MICROPUNCTURE

## (undated) DEVICE — SOL INJ SOD CL 0.9% 500ML BG --

## (undated) DEVICE — TAPE UMBILICAL W1/16XL30IN COTTON ROUND NONRADIOPAQUE

## (undated) DEVICE — SYRINGE MED 10ML LUERLOCK TIP W/O SFTY DISP

## (undated) DEVICE — Z INACTIVE USE 2854267 SPONGE GZ W4XL4IN COT 12 PLY TYP VII WVN C FLD DSGN

## (undated) DEVICE — SUTURE PROL SZ 6-0 L30IN NONABSORBABLE BLU L13MM RB-2 1/2 8711H

## (undated) DEVICE — TUBING, SUCTION, 1/4" X 12', STRAIGHT: Brand: MEDLINE

## (undated) DEVICE — COVER LT HNDL PLAS RIG 1 PER PK

## (undated) DEVICE — HYPODERMIC SAFETY NEEDLE: Brand: MONOJECT

## (undated) DEVICE — ANGLED-TIP ARTERIAL SHEATH CONFIGURATION: Brand: ENROUTE TRANSCAROTID NEUROPROTECTION SYSTEM

## (undated) DEVICE — DRAPE,FEM ANGIO,2 WIN,STERILE: Brand: MEDLINE

## (undated) DEVICE — AGENT HEMSTAT W2XL3IN OXIDIZED REGENERATED CELOS ABSRB

## (undated) DEVICE — SUTURE MCRYL SZ 4-0 L27IN ABSRB UD L19MM PS-2 1/2 CIR PRIM Y426H

## (undated) DEVICE — SOLUTION IV 500ML 0.9% SOD CHL PH 5 INJ USP VIAFLX PLAS

## (undated) DEVICE — DEVICE INFL 20ML L13IN 30ATM CLR POLYCARB TB PRTBL DGT

## (undated) DEVICE — WRAP SURG W1.31XL1.34M CARD FOR PT 165-172CM THERMOWRP

## (undated) DEVICE — SPONGE GZ W4XL4IN COT 12 PLY TYP VII WVN C FLD DSGN STERILE